# Patient Record
Sex: MALE | Race: ASIAN | Employment: FULL TIME | URBAN - METROPOLITAN AREA
[De-identification: names, ages, dates, MRNs, and addresses within clinical notes are randomized per-mention and may not be internally consistent; named-entity substitution may affect disease eponyms.]

---

## 2017-05-26 ENCOUNTER — EMPLOYEE WELLNESS (OUTPATIENT)
Dept: OTHER | Age: 47
End: 2017-05-26

## 2017-05-26 LAB
CHOLESTEROL, TOTAL: 275 MG/DL (ref 0–199)
GLUCOSE BLD-MCNC: 75 MG/DL (ref 70–99)
HDLC SERPL-MCNC: 61 MG/DL (ref 40–60)
LDL CHOLESTEROL CALCULATED: 189 MG/DL
TRIGL SERPL-MCNC: 127 MG/DL (ref 0–150)

## 2017-11-14 ENCOUNTER — OFFICE VISIT (OUTPATIENT)
Dept: URGENT CARE | Age: 47
End: 2017-11-14

## 2017-11-14 VITALS
WEIGHT: 254 LBS | DIASTOLIC BLOOD PRESSURE: 88 MMHG | TEMPERATURE: 97.3 F | OXYGEN SATURATION: 96 % | HEART RATE: 66 BPM | HEIGHT: 67 IN | RESPIRATION RATE: 14 BRPM | SYSTOLIC BLOOD PRESSURE: 134 MMHG | BODY MASS INDEX: 39.87 KG/M2

## 2017-11-14 DIAGNOSIS — H10.9 BACTERIAL CONJUNCTIVITIS OF RIGHT EYE: Primary | ICD-10-CM

## 2017-11-14 PROCEDURE — 99202 OFFICE O/P NEW SF 15 MIN: CPT | Performed by: PHYSICIAN ASSISTANT

## 2017-11-14 RX ORDER — POLYMYXIN B SULFATE AND TRIMETHOPRIM 1; 10000 MG/ML; [USP'U]/ML
1 SOLUTION OPHTHALMIC 4 TIMES DAILY
Qty: 1 BOTTLE | Refills: 0 | Status: SHIPPED | OUTPATIENT
Start: 2017-11-14 | End: 2017-11-19

## 2017-11-14 ASSESSMENT — ENCOUNTER SYMPTOMS
PHOTOPHOBIA: 0
BLURRED VISION: 0
EYE PAIN: 0
DOUBLE VISION: 0
EYE DISCHARGE: 1
SORE THROAT: 0
SINUS PAIN: 0
EYE REDNESS: 1

## 2017-11-14 NOTE — PROGRESS NOTES
Reason for Visit:   Chief Complaint   Patient presents with    Conjunctivitis     Woke up this AM with eyes matted, has had red dry eyes x3 days     Patient History     HPI: Rupa Reece is a 52 y.o. male who presents with R eye erythema, itching/gritty sensation x 2 days. He reports waking up this morning with matting and purulent crusting at affected eye. He denies any vision changes, photophobia, eye pain/pressure, trauma, or FB to the eye. He usually wars contacts, but has been wearing glasses since sxs have developed. He reports a son who is ill with URI at home. Past Medical History:   Diagnosis Date    Chicken pox        History reviewed. No pertinent surgical history. Allergies: Allergies   Allergen Reactions    Amoxicillin        Review of Systems     ROS: Please refer to HPI for any pertinent positive findings, as all other systems were reviewed as negative unless otherwise listed above. Review of Systems   Constitutional: Negative for chills, diaphoresis, fever and malaise/fatigue. HENT: Negative for congestion, ear pain, sinus pain and sore throat. Eyes: Positive for discharge and redness. Negative for blurred vision, double vision, photophobia and pain. Physical Exam     Vitals:    11/14/17 1016   BP: 134/88   Pulse: 66   Resp: 14   Temp: 97.3 °F (36.3 °C)   SpO2: 96%       Constitutional:  Well developed, well nourished, no acute distress, non-toxic appearance   Eyes:  PERRL and EOMs intact; conjunctival and scleral erythema present at R eye, no ciliary injection, evidence of foreign body, or discharge. HENT:  Head is normocephalic, atraumatic; external ears normal, external nose and nasal mucosa normal, oropharynx pink and moist, no pharyngeal exudates. Neck- Supple, passive and active range of motion in tact, no tenderness upon palpation along spine. No LAD  or neck masses appreciated.    Respiratory:  No respiratory

## 2018-03-20 VITALS — WEIGHT: 247 LBS

## 2019-05-24 ENCOUNTER — EMPLOYEE WELLNESS (OUTPATIENT)
Dept: OTHER | Age: 49
End: 2019-05-24

## 2019-05-24 LAB
CHOLESTEROL, TOTAL: 238 MG/DL (ref 0–199)
GLUCOSE BLD-MCNC: 83 MG/DL (ref 70–99)
HDLC SERPL-MCNC: 56 MG/DL (ref 40–60)
LDL CHOLESTEROL CALCULATED: 160 MG/DL
TRIGL SERPL-MCNC: 108 MG/DL (ref 0–150)

## 2019-05-28 VITALS — WEIGHT: 255 LBS | BODY MASS INDEX: 39.94 KG/M2

## 2020-01-01 ENCOUNTER — APPOINTMENT (OUTPATIENT)
Dept: INTERVENTIONAL RADIOLOGY/VASCULAR | Age: 50
DRG: 208 | End: 2020-01-01
Payer: COMMERCIAL

## 2020-01-01 ENCOUNTER — CARE COORDINATION (OUTPATIENT)
Dept: OTHER | Facility: CLINIC | Age: 50
End: 2020-01-01

## 2020-01-01 ENCOUNTER — APPOINTMENT (OUTPATIENT)
Dept: CT IMAGING | Age: 50
DRG: 208 | End: 2020-01-01
Payer: COMMERCIAL

## 2020-01-01 ENCOUNTER — APPOINTMENT (OUTPATIENT)
Dept: GENERAL RADIOLOGY | Age: 50
DRG: 208 | End: 2020-01-01
Payer: COMMERCIAL

## 2020-01-01 ENCOUNTER — TELEPHONE (OUTPATIENT)
Dept: PRIMARY CARE CLINIC | Age: 50
End: 2020-01-01
Payer: COMMERCIAL

## 2020-01-01 ENCOUNTER — HOSPITAL ENCOUNTER (OUTPATIENT)
Dept: INTERVENTIONAL RADIOLOGY/VASCULAR | Age: 50
Discharge: HOME | End: 2020-08-25
Attending: INTERNAL MEDICINE | Admitting: INTERNAL MEDICINE
Payer: COMMERCIAL

## 2020-01-01 ENCOUNTER — HOSPITAL ENCOUNTER (INPATIENT)
Age: 50
LOS: 7 days | DRG: 208 | End: 2020-10-05
Attending: EMERGENCY MEDICINE | Admitting: INTERNAL MEDICINE
Payer: COMMERCIAL

## 2020-01-01 ENCOUNTER — TELEPHONE (OUTPATIENT)
Dept: PRIMARY CARE CLINIC | Age: 50
End: 2020-01-01

## 2020-01-01 ENCOUNTER — HOSPITAL ENCOUNTER (OUTPATIENT)
Dept: INTERVENTIONAL RADIOLOGY/VASCULAR | Age: 50
Discharge: HOME | End: 2020-09-21
Payer: COMMERCIAL

## 2020-01-01 ENCOUNTER — APPOINTMENT (OUTPATIENT)
Dept: ULTRASOUND IMAGING | Age: 50
DRG: 208 | End: 2020-01-01
Payer: COMMERCIAL

## 2020-01-01 ENCOUNTER — VIRTUAL VISIT (OUTPATIENT)
Dept: PRIMARY CARE CLINIC | Age: 50
End: 2020-01-01
Payer: COMMERCIAL

## 2020-01-01 ENCOUNTER — OFFICE VISIT (OUTPATIENT)
Dept: PRIMARY CARE CLINIC | Age: 50
End: 2020-01-01
Payer: COMMERCIAL

## 2020-01-01 VITALS — HEIGHT: 67 IN | WEIGHT: 235 LBS | TEMPERATURE: 98.4 F | BODY MASS INDEX: 36.88 KG/M2

## 2020-01-01 VITALS — TEMPERATURE: 98.6 F | WEIGHT: 225 LBS | HEIGHT: 67 IN | BODY MASS INDEX: 35.31 KG/M2

## 2020-01-01 VITALS
WEIGHT: 255.51 LBS | HEIGHT: 67 IN | BODY MASS INDEX: 40.1 KG/M2 | DIASTOLIC BLOOD PRESSURE: 24 MMHG | OXYGEN SATURATION: 80 % | TEMPERATURE: 97.9 F | SYSTOLIC BLOOD PRESSURE: 60 MMHG | HEART RATE: 99 BPM | RESPIRATION RATE: 25 BRPM

## 2020-01-01 LAB
ALBUMIN FLUID: 0.7 G/DL
ALBUMIN SERPL-MCNC: 2.4 G/DL (ref 3.4–5)
ALBUMIN SERPL-MCNC: 2.5 G/DL (ref 3.4–5)
ALBUMIN SERPL-MCNC: 2.5 G/DL (ref 3.4–5)
ALBUMIN SERPL-MCNC: 2.6 G/DL (ref 3.4–5)
ALBUMIN SERPL-MCNC: 2.6 G/DL (ref 3.4–5)
ALBUMIN SERPL-MCNC: 3 G/DL (ref 3.4–5)
ALP BLD-CCNC: 499 U/L (ref 40–129)
ALP BLD-CCNC: 582 U/L (ref 40–129)
ALP BLD-CCNC: 695 U/L (ref 40–129)
ALP BLD-CCNC: 747 U/L (ref 40–129)
ALP BLD-CCNC: 770 U/L (ref 40–129)
ALP BLD-CCNC: 780 U/L (ref 40–129)
ALP BLD-CCNC: 780 U/L (ref 40–129)
ALT SERPL-CCNC: 106 U/L (ref 10–40)
ALT SERPL-CCNC: 108 U/L (ref 10–40)
ALT SERPL-CCNC: 113 U/L (ref 10–40)
ALT SERPL-CCNC: 117 U/L (ref 10–40)
ALT SERPL-CCNC: 124 U/L (ref 10–40)
ALT SERPL-CCNC: 254 U/L (ref 10–40)
ALT SERPL-CCNC: 85 U/L (ref 10–40)
AMORPHOUS: ABNORMAL /HPF
AMYLASE FLUID: 55 U/L
ANION GAP SERPL CALCULATED.3IONS-SCNC: 13 MMOL/L (ref 3–16)
ANION GAP SERPL CALCULATED.3IONS-SCNC: 14 MMOL/L (ref 3–16)
ANION GAP SERPL CALCULATED.3IONS-SCNC: 14 MMOL/L (ref 3–16)
ANION GAP SERPL CALCULATED.3IONS-SCNC: 15 MMOL/L (ref 3–16)
ANION GAP SERPL CALCULATED.3IONS-SCNC: 17 MMOL/L (ref 3–16)
ANION GAP SERPL CALCULATED.3IONS-SCNC: 17 MMOL/L (ref 3–16)
ANION GAP SERPL CALCULATED.3IONS-SCNC: 18 MMOL/L (ref 3–16)
ANION GAP SERPL CALCULATED.3IONS-SCNC: 18 MMOL/L (ref 3–16)
ANION GAP SERPL CALCULATED.3IONS-SCNC: 20 MMOL/L (ref 3–16)
ANION GAP SERPL CALCULATED.3IONS-SCNC: 20 MMOL/L (ref 3–16)
ANISOCYTOSIS: ABNORMAL
ANTI-NUCLEAR ANTIBODY (ANA): NEGATIVE
ANTI-SCL70 IGG: <0.2 AI (ref 0–0.9)
ANTI-XA UNFRAC HEPARIN: 0.04 IU/ML (ref 0.3–0.7)
ANTI-XA UNFRAC HEPARIN: 0.05 IU/ML (ref 0.3–0.7)
ANTI-XA UNFRAC HEPARIN: 0.05 IU/ML (ref 0.3–0.7)
ANTI-XA UNFRAC HEPARIN: 0.13 IU/ML (ref 0.3–0.7)
APPEARANCE FLUID: NORMAL
APTT: 125.6 SEC (ref 24.2–36.2)
APTT: 126 SEC (ref 24.2–36.2)
APTT: 139.5 SEC (ref 24.2–36.2)
APTT: 148.2 SEC (ref 24.2–36.2)
APTT: 32 SEC (ref 24.2–36.2)
APTT: 34.1 SEC (ref 24.2–36.2)
APTT: 35.9 SEC (ref 24.2–36.2)
APTT: 59.9 SEC (ref 24.2–36.2)
APTT: 63.7 SEC (ref 24.2–36.2)
APTT: 70.7 SEC (ref 24.2–36.2)
APTT: 72.1 SEC (ref 24.2–36.2)
APTT: 74.2 SEC (ref 24.2–36.2)
APTT: 84 SEC (ref 24.2–36.2)
APTT: >248 SEC (ref 24.2–36.2)
AST SERPL-CCNC: 1746 U/L (ref 15–37)
AST SERPL-CCNC: 206 U/L (ref 15–37)
AST SERPL-CCNC: 210 U/L (ref 15–37)
AST SERPL-CCNC: 213 U/L (ref 15–37)
AST SERPL-CCNC: 234 U/L (ref 15–37)
BACTERIA: ABNORMAL /HPF
BANDED NEUTROPHILS RELATIVE PERCENT: 1 % (ref 0–7)
BANDED NEUTROPHILS RELATIVE PERCENT: 3 % (ref 0–7)
BANDED NEUTROPHILS RELATIVE PERCENT: 5 % (ref 0–7)
BASE EXCESS ARTERIAL: -11 (ref -3–3)
BASE EXCESS ARTERIAL: -12 (ref -3–3)
BASE EXCESS ARTERIAL: -13 (ref -3–3)
BASE EXCESS ARTERIAL: -13 (ref -3–3)
BASE EXCESS ARTERIAL: -8 (ref -3–3)
BASE EXCESS VENOUS: -1.7 MMOL/L (ref -2–3)
BASE EXCESS VENOUS: -12 (ref -3–3)
BASE EXCESS VENOUS: -13 (ref -3–3)
BASOPHILS ABSOLUTE: 0 K/UL (ref 0–0.2)
BASOPHILS ABSOLUTE: 0.1 K/UL (ref 0–0.2)
BASOPHILS ABSOLUTE: 0.1 K/UL (ref 0–0.2)
BASOPHILS ABSOLUTE: 0.4 K/UL (ref 0–0.2)
BASOPHILS RELATIVE PERCENT: 0 %
BASOPHILS RELATIVE PERCENT: 0.2 %
BASOPHILS RELATIVE PERCENT: 0.3 %
BASOPHILS RELATIVE PERCENT: 0.4 %
BASOPHILS RELATIVE PERCENT: 1 %
BILIRUB SERPL-MCNC: 5.3 MG/DL (ref 0–1)
BILIRUB SERPL-MCNC: 5.3 MG/DL (ref 0–1)
BILIRUB SERPL-MCNC: 6.1 MG/DL (ref 0–1)
BILIRUB SERPL-MCNC: 7.3 MG/DL (ref 0–1)
BILIRUB SERPL-MCNC: 8.4 MG/DL (ref 0–1)
BILIRUB SERPL-MCNC: 8.5 MG/DL (ref 0–1)
BILIRUB SERPL-MCNC: 9.6 MG/DL (ref 0–1)
BILIRUBIN DIRECT: 4.4 MG/DL (ref 0–0.3)
BILIRUBIN DIRECT: 4.6 MG/DL (ref 0–0.3)
BILIRUBIN DIRECT: 5.3 MG/DL (ref 0–0.3)
BILIRUBIN DIRECT: 6.2 MG/DL (ref 0–0.3)
BILIRUBIN DIRECT: 6.2 MG/DL (ref 0–0.3)
BILIRUBIN DIRECT: 7 MG/DL (ref 0–0.3)
BILIRUBIN DIRECT: 7.7 MG/DL (ref 0–0.3)
BILIRUBIN URINE: ABNORMAL
BILIRUBIN, INDIRECT: 0.7 MG/DL (ref 0–1)
BILIRUBIN, INDIRECT: 0.8 MG/DL (ref 0–1)
BILIRUBIN, INDIRECT: 0.9 MG/DL (ref 0–1)
BILIRUBIN, INDIRECT: 1.1 MG/DL (ref 0–1)
BILIRUBIN, INDIRECT: 1.4 MG/DL (ref 0–1)
BILIRUBIN, INDIRECT: 1.9 MG/DL (ref 0–1)
BILIRUBIN, INDIRECT: 2.3 MG/DL (ref 0–1)
BLOOD CULTURE, ROUTINE: NORMAL
BLOOD SMEAR REVIEW: NORMAL
BLOOD, URINE: NEGATIVE
BODY FLUID CULTURE, STERILE: NORMAL
BUN BLDV-MCNC: 30 MG/DL (ref 7–20)
BUN BLDV-MCNC: 34 MG/DL (ref 7–20)
BUN BLDV-MCNC: 37 MG/DL (ref 7–20)
BUN BLDV-MCNC: 41 MG/DL (ref 7–20)
BUN BLDV-MCNC: 52 MG/DL (ref 7–20)
BUN BLDV-MCNC: 64 MG/DL (ref 7–20)
BUN BLDV-MCNC: 68 MG/DL (ref 7–20)
BUN BLDV-MCNC: 71 MG/DL (ref 7–20)
BUN BLDV-MCNC: 72 MG/DL (ref 7–20)
BUN BLDV-MCNC: 86 MG/DL (ref 7–20)
CALCIUM IONIZED: 0.96 MMOL/L (ref 1.12–1.32)
CALCIUM IONIZED: 1 MMOL/L (ref 1.12–1.32)
CALCIUM IONIZED: 1.01 MMOL/L (ref 1.12–1.32)
CALCIUM IONIZED: 1.02 MMOL/L (ref 1.12–1.32)
CALCIUM IONIZED: 1.04 MMOL/L (ref 1.12–1.32)
CALCIUM IONIZED: 1.05 MMOL/L (ref 1.12–1.32)
CALCIUM IONIZED: 1.08 MMOL/L (ref 1.12–1.32)
CALCIUM IONIZED: 1.19 MMOL/L (ref 1.12–1.32)
CALCIUM SERPL-MCNC: 6.5 MG/DL (ref 8.3–10.6)
CALCIUM SERPL-MCNC: 7.8 MG/DL (ref 8.3–10.6)
CALCIUM SERPL-MCNC: 7.8 MG/DL (ref 8.3–10.6)
CALCIUM SERPL-MCNC: 8 MG/DL (ref 8.3–10.6)
CALCIUM SERPL-MCNC: 8 MG/DL (ref 8.3–10.6)
CALCIUM SERPL-MCNC: 8.1 MG/DL (ref 8.3–10.6)
CALCIUM SERPL-MCNC: 8.1 MG/DL (ref 8.3–10.6)
CALCIUM SERPL-MCNC: 8.2 MG/DL (ref 8.3–10.6)
CALCIUM SERPL-MCNC: 8.2 MG/DL (ref 8.3–10.6)
CALCIUM SERPL-MCNC: 8.3 MG/DL (ref 8.3–10.6)
CARBOXYHEMOGLOBIN: 1.6 % (ref 0–1.5)
CELL COUNT FLUID TYPE: NORMAL
CHLORIDE BLD-SCNC: 86 MMOL/L (ref 99–110)
CHLORIDE BLD-SCNC: 86 MMOL/L (ref 99–110)
CHLORIDE BLD-SCNC: 87 MMOL/L (ref 99–110)
CHLORIDE BLD-SCNC: 87 MMOL/L (ref 99–110)
CHLORIDE BLD-SCNC: 88 MMOL/L (ref 99–110)
CHLORIDE BLD-SCNC: 90 MMOL/L (ref 99–110)
CHLORIDE BLD-SCNC: 90 MMOL/L (ref 99–110)
CHLORIDE BLD-SCNC: 91 MMOL/L (ref 99–110)
CHLORIDE BLD-SCNC: 92 MMOL/L (ref 99–110)
CHLORIDE BLD-SCNC: 96 MMOL/L (ref 99–110)
CHOLESTEROL, TOTAL: 199 MG/DL (ref 0–199)
CLARITY: CLEAR
CLOT EVALUATION: NORMAL
CO2: 16 MMOL/L (ref 21–32)
CO2: 17 MMOL/L (ref 21–32)
CO2: 17 MMOL/L (ref 21–32)
CO2: 18 MMOL/L (ref 21–32)
CO2: 19 MMOL/L (ref 21–32)
CO2: 20 MMOL/L (ref 21–32)
COLOR FLUID: YELLOW
COLOR: YELLOW
CORTISOL TOTAL: 27.2 UG/DL
CREAT SERPL-MCNC: 0.9 MG/DL (ref 0.9–1.3)
CREAT SERPL-MCNC: 0.9 MG/DL (ref 0.9–1.3)
CREAT SERPL-MCNC: 1.1 MG/DL (ref 0.9–1.3)
CREAT SERPL-MCNC: 1.2 MG/DL (ref 0.9–1.3)
CREAT SERPL-MCNC: 1.2 MG/DL (ref 0.9–1.3)
CREAT SERPL-MCNC: 1.3 MG/DL (ref 0.9–1.3)
CREAT SERPL-MCNC: 2 MG/DL (ref 0.9–1.3)
CREAT SERPL-MCNC: 2 MG/DL (ref 0.9–1.3)
CREATININE URINE: 248.2 MG/DL (ref 39–259)
CULTURE CATHETER TIP: ABNORMAL
CULTURE CATHETER TIP: ABNORMAL
CULTURE, BLOOD 2: NORMAL
D DIMER: >5250 NG/ML DDU (ref 0–229)
EKG ATRIAL RATE: 110 BPM
EKG ATRIAL RATE: 111 BPM
EKG DIAGNOSIS: NORMAL
EKG DIAGNOSIS: NORMAL
EKG P AXIS: 49 DEGREES
EKG P AXIS: 55 DEGREES
EKG P-R INTERVAL: 116 MS
EKG P-R INTERVAL: 116 MS
EKG Q-T INTERVAL: 324 MS
EKG Q-T INTERVAL: 348 MS
EKG QRS DURATION: 86 MS
EKG QRS DURATION: 90 MS
EKG QTC CALCULATION (BAZETT): 438 MS
EKG QTC CALCULATION (BAZETT): 473 MS
EKG R AXIS: 121 DEGREES
EKG R AXIS: 17 DEGREES
EKG T AXIS: 20 DEGREES
EKG T AXIS: 39 DEGREES
EKG VENTRICULAR RATE: 110 BPM
EKG VENTRICULAR RATE: 111 BPM
EOSINOPHILS ABSOLUTE: 0 K/UL (ref 0–0.6)
EOSINOPHILS ABSOLUTE: 0 K/UL (ref 0–0.6)
EOSINOPHILS ABSOLUTE: 0.1 K/UL (ref 0–0.6)
EOSINOPHILS ABSOLUTE: 0.1 K/UL (ref 0–0.6)
EOSINOPHILS ABSOLUTE: 0.2 K/UL (ref 0–0.6)
EOSINOPHILS ABSOLUTE: 0.3 K/UL (ref 0–0.6)
EOSINOPHILS ABSOLUTE: 0.4 K/UL (ref 0–0.6)
EOSINOPHILS RELATIVE PERCENT: 0 %
EOSINOPHILS RELATIVE PERCENT: 0 %
EOSINOPHILS RELATIVE PERCENT: 0.3 %
EOSINOPHILS RELATIVE PERCENT: 0.3 %
EOSINOPHILS RELATIVE PERCENT: 1 %
FACTOR VIII ACTIVITY: 1020 % (ref 50–150)
FIBRINOGEN: 139 MG/DL (ref 200–397)
FIBRINOGEN: 223 MG/DL (ref 200–397)
FIBRINOGEN: 233 MG/DL (ref 200–397)
FIBRINOGEN: 240 MG/DL (ref 200–397)
FIBRINOGEN: 240 MG/DL (ref 200–397)
FIBRINOGEN: 242 MG/DL (ref 200–397)
FIBRINOGEN: 246 MG/DL (ref 200–397)
FIBRINOGEN: 269 MG/DL (ref 200–397)
FLUID TYPE: NORMAL
GFR AFRICAN AMERICAN: 43
GFR AFRICAN AMERICAN: 43
GFR AFRICAN AMERICAN: >60
GFR NON-AFRICAN AMERICAN: 36
GFR NON-AFRICAN AMERICAN: 36
GFR NON-AFRICAN AMERICAN: 58
GFR NON-AFRICAN AMERICAN: >60
GLUCOSE BLD-MCNC: 106 MG/DL (ref 70–99)
GLUCOSE BLD-MCNC: 107 MG/DL (ref 70–99)
GLUCOSE BLD-MCNC: 109 MG/DL (ref 70–99)
GLUCOSE BLD-MCNC: 113 MG/DL (ref 70–99)
GLUCOSE BLD-MCNC: 117 MG/DL (ref 70–99)
GLUCOSE BLD-MCNC: 117 MG/DL (ref 70–99)
GLUCOSE BLD-MCNC: 120 MG/DL (ref 70–99)
GLUCOSE BLD-MCNC: 129 MG/DL (ref 70–99)
GLUCOSE BLD-MCNC: 147 MG/DL (ref 70–99)
GLUCOSE BLD-MCNC: 154 MG/DL (ref 70–99)
GLUCOSE BLD-MCNC: 163 MG/DL (ref 70–99)
GLUCOSE BLD-MCNC: 175 MG/DL (ref 70–99)
GLUCOSE BLD-MCNC: 361 MG/DL (ref 70–99)
GLUCOSE BLD-MCNC: 87 MG/DL (ref 70–99)
GLUCOSE BLD-MCNC: 92 MG/DL (ref 70–99)
GLUCOSE BLD-MCNC: 93 MG/DL (ref 70–99)
GLUCOSE BLD-MCNC: 93 MG/DL (ref 70–99)
GLUCOSE URINE: NEGATIVE MG/DL
GLUCOSE, FLUID: 128 MG/DL
GRAM STAIN RESULT: NORMAL
HAPTOGLOBIN: <10 MG/DL (ref 30–200)
HAV IGM SER IA-ACNC: NORMAL
HCO3 ARTERIAL: 16 MMOL/L (ref 21–29)
HCO3 ARTERIAL: 16.6 MMOL/L (ref 21–29)
HCO3 ARTERIAL: 16.7 MMOL/L (ref 21–29)
HCO3 ARTERIAL: 17.2 MMOL/L (ref 21–29)
HCO3 ARTERIAL: 19.1 MMOL/L (ref 21–29)
HCO3 VENOUS: 16.3 MMOL/L (ref 23–29)
HCO3 VENOUS: 18.1 MMOL/L (ref 23–29)
HCO3 VENOUS: 20.9 MMOL/L (ref 24–28)
HCT VFR BLD CALC: 28.6 % (ref 40.5–52.5)
HCT VFR BLD CALC: 35.7 % (ref 40.5–52.5)
HCT VFR BLD CALC: 38.4 % (ref 40.5–52.5)
HCT VFR BLD CALC: 39.7 % (ref 40.5–52.5)
HCT VFR BLD CALC: 40.8 % (ref 40.5–52.5)
HCT VFR BLD CALC: 41.6 % (ref 40.5–52.5)
HCT VFR BLD CALC: 42.2 % (ref 40.5–52.5)
HCT VFR BLD CALC: 42.2 % (ref 40.5–52.5)
HCT VFR BLD CALC: 42.3 % (ref 40.5–52.5)
HCT VFR BLD CALC: 45.6 % (ref 40.5–52.5)
HCT VFR BLD CALC: 47.3 % (ref 40.5–52.5)
HDLC SERPL-MCNC: 17 MG/DL (ref 40–60)
HEMOGLOBIN, VEN, REDUCED: 37.9 %
HEMOGLOBIN: 11.2 G/DL (ref 13.5–17.5)
HEMOGLOBIN: 12.6 G/DL (ref 13.5–17.5)
HEMOGLOBIN: 13.1 G/DL (ref 13.5–17.5)
HEMOGLOBIN: 13.7 G/DL (ref 13.5–17.5)
HEMOGLOBIN: 13.9 G/DL (ref 13.5–17.5)
HEMOGLOBIN: 14 G/DL (ref 13.5–17.5)
HEMOGLOBIN: 14.1 G/DL (ref 13.5–17.5)
HEMOGLOBIN: 14.2 G/DL (ref 13.5–17.5)
HEMOGLOBIN: 15.5 G/DL (ref 13.5–17.5)
HEMOGLOBIN: 15.7 G/DL (ref 13.5–17.5)
HEMOGLOBIN: 9 G/DL (ref 13.5–17.5)
HEPARIN INDUCED PLATELET ANTIBODY: NEGATIVE
HEPATITIS B CORE IGM ANTIBODY: NORMAL
HEPATITIS B SURFACE ANTIGEN INTERPRETATION: NORMAL
HEPATITIS C ANTIBODY INTERPRETATION: NORMAL
HYALINE CASTS: ABNORMAL /LPF (ref 0–2)
HYPOCHROMIA: ABNORMAL
HYPOCHROMIA: ABNORMAL
INR BLD: 1.03 (ref 0.86–1.14)
INR BLD: 1.34 (ref 0.86–1.14)
INR BLD: 1.44 (ref 0.86–1.14)
INR BLD: 1.56 (ref 0.86–1.14)
INR BLD: 1.58 (ref 0.86–1.14)
INR BLD: 6.35 (ref 0.86–1.14)
KETONES, URINE: ABNORMAL MG/DL
LACTATE DEHYDROGENASE: 1175 U/L (ref 100–190)
LACTATE DEHYDROGENASE: 1395 U/L (ref 100–190)
LACTATE: 10.27 MMOL/L (ref 0.4–2)
LACTATE: 11.33 MMOL/L (ref 0.4–2)
LACTATE: 13.18 MMOL/L (ref 0.4–2)
LACTATE: 15.24 MMOL/L (ref 0.4–2)
LACTATE: 6.59 MMOL/L (ref 0.4–2)
LACTATE: 8.72 MMOL/L (ref 0.4–2)
LACTATE: 9.56 MMOL/L (ref 0.4–2)
LACTIC ACID: 11.4 MMOL/L (ref 0.4–2)
LACTIC ACID: 3 MMOL/L (ref 0.4–2)
LACTIC ACID: 3 MMOL/L (ref 0.4–2)
LACTIC ACID: 3.1 MMOL/L (ref 0.4–2)
LACTIC ACID: 3.2 MMOL/L (ref 0.4–2)
LACTIC ACID: 3.3 MMOL/L (ref 0.4–2)
LACTIC ACID: 3.4 MMOL/L (ref 0.4–2)
LACTIC ACID: 3.8 MMOL/L (ref 0.4–2)
LACTIC ACID: 4.5 MMOL/L (ref 0.4–2)
LACTIC ACID: 6.3 MMOL/L (ref 0.4–2)
LD, FLUID: 358 U/L
LDL CHOLESTEROL CALCULATED: 159 MG/DL
LEUKOCYTE ESTERASE, URINE: NEGATIVE
LV EF: 58 %
LVEF MODALITY: NORMAL
LYMPHOCYTES ABSOLUTE: 0 K/UL (ref 1–5.1)
LYMPHOCYTES ABSOLUTE: 0.3 K/UL (ref 1–5.1)
LYMPHOCYTES ABSOLUTE: 0.5 K/UL (ref 1–5.1)
LYMPHOCYTES ABSOLUTE: 0.7 K/UL (ref 1–5.1)
LYMPHOCYTES ABSOLUTE: 0.8 K/UL (ref 1–5.1)
LYMPHOCYTES ABSOLUTE: 1.1 K/UL (ref 1–5.1)
LYMPHOCYTES ABSOLUTE: 1.1 K/UL (ref 1–5.1)
LYMPHOCYTES RELATIVE PERCENT: 0 %
LYMPHOCYTES RELATIVE PERCENT: 1 %
LYMPHOCYTES RELATIVE PERCENT: 2 %
LYMPHOCYTES RELATIVE PERCENT: 2.7 %
LYMPHOCYTES RELATIVE PERCENT: 3 %
LYMPHOCYTES RELATIVE PERCENT: 3 %
LYMPHOCYTES RELATIVE PERCENT: 3.1 %
LYMPHOCYTES RELATIVE PERCENT: 4 %
LYMPHOCYTES RELATIVE PERCENT: 4 %
LYMPHOCYTES, BODY FLUID: 14 %
MACROCYTES: ABNORMAL
MACROPHAGE FLUID: 53 %
MAGNESIUM: 2.8 MG/DL (ref 1.8–2.4)
MAGNESIUM: 3.3 MG/DL (ref 1.8–2.4)
MCH RBC QN AUTO: 27.8 PG (ref 26–34)
MCH RBC QN AUTO: 28.1 PG (ref 26–34)
MCH RBC QN AUTO: 28.2 PG (ref 26–34)
MCH RBC QN AUTO: 28.4 PG (ref 26–34)
MCH RBC QN AUTO: 28.5 PG (ref 26–34)
MCH RBC QN AUTO: 28.7 PG (ref 26–34)
MCHC RBC AUTO-ENTMCNC: 31.5 G/DL (ref 31–36)
MCHC RBC AUTO-ENTMCNC: 31.6 G/DL (ref 31–36)
MCHC RBC AUTO-ENTMCNC: 32.8 G/DL (ref 31–36)
MCHC RBC AUTO-ENTMCNC: 33 G/DL (ref 31–36)
MCHC RBC AUTO-ENTMCNC: 33 G/DL (ref 31–36)
MCHC RBC AUTO-ENTMCNC: 33.1 G/DL (ref 31–36)
MCHC RBC AUTO-ENTMCNC: 33.3 G/DL (ref 31–36)
MCHC RBC AUTO-ENTMCNC: 33.5 G/DL (ref 31–36)
MCHC RBC AUTO-ENTMCNC: 33.6 G/DL (ref 31–36)
MCHC RBC AUTO-ENTMCNC: 33.6 G/DL (ref 31–36)
MCHC RBC AUTO-ENTMCNC: 34 G/DL (ref 31–36)
MCV RBC AUTO: 84.1 FL (ref 80–100)
MCV RBC AUTO: 84.4 FL (ref 80–100)
MCV RBC AUTO: 84.7 FL (ref 80–100)
MCV RBC AUTO: 85.1 FL (ref 80–100)
MCV RBC AUTO: 85.2 FL (ref 80–100)
MCV RBC AUTO: 86.2 FL (ref 80–100)
MCV RBC AUTO: 86.2 FL (ref 80–100)
MCV RBC AUTO: 88.4 FL (ref 80–100)
MCV RBC AUTO: 89.9 FL (ref 80–100)
METAMYELOCYTES RELATIVE PERCENT: 1 %
METHEMOGLOBIN VENOUS: 0.4 % (ref 0–1.5)
MICROSCOPIC EXAMINATION: YES
MITOCHONDRIAL M2 AB, IGG: 3 UNITS (ref 0–24.9)
MONOCYTES ABSOLUTE: 0.7 K/UL (ref 0–1.3)
MONOCYTES ABSOLUTE: 1.4 K/UL (ref 0–1.3)
MONOCYTES ABSOLUTE: 1.5 K/UL (ref 0–1.3)
MONOCYTES ABSOLUTE: 1.6 K/UL (ref 0–1.3)
MONOCYTES ABSOLUTE: 1.8 K/UL (ref 0–1.3)
MONOCYTES ABSOLUTE: 1.8 K/UL (ref 0–1.3)
MONOCYTES ABSOLUTE: 2.2 K/UL (ref 0–1.3)
MONOCYTES ABSOLUTE: 2.3 K/UL (ref 0–1.3)
MONOCYTES ABSOLUTE: 2.7 K/UL (ref 0–1.3)
MONOCYTES RELATIVE PERCENT: 10 %
MONOCYTES RELATIVE PERCENT: 3 %
MONOCYTES RELATIVE PERCENT: 5 %
MONOCYTES RELATIVE PERCENT: 5.8 %
MONOCYTES RELATIVE PERCENT: 6.3 %
MONOCYTES RELATIVE PERCENT: 6.5 %
MONOCYTES RELATIVE PERCENT: 7 %
MONOCYTES RELATIVE PERCENT: 8 %
MONOCYTES RELATIVE PERCENT: 8 %
MRSA SCREEN RT-PCR: NORMAL
MYELOCYTE PERCENT: 2 %
MYELOCYTE PERCENT: 3 %
NEUTROPHIL, FLUID: 33 %
NEUTROPHILS ABSOLUTE: 20.7 K/UL (ref 1.7–7.7)
NEUTROPHILS ABSOLUTE: 21.7 K/UL (ref 1.7–7.7)
NEUTROPHILS ABSOLUTE: 21.9 K/UL (ref 1.7–7.7)
NEUTROPHILS ABSOLUTE: 23.2 K/UL (ref 1.7–7.7)
NEUTROPHILS ABSOLUTE: 23.5 K/UL (ref 1.7–7.7)
NEUTROPHILS ABSOLUTE: 23.9 K/UL (ref 1.7–7.7)
NEUTROPHILS ABSOLUTE: 24.2 K/UL (ref 1.7–7.7)
NEUTROPHILS ABSOLUTE: 24.9 K/UL (ref 1.7–7.7)
NEUTROPHILS ABSOLUTE: 33.4 K/UL (ref 1.7–7.7)
NEUTROPHILS RELATIVE PERCENT: 82 %
NEUTROPHILS RELATIVE PERCENT: 87 %
NEUTROPHILS RELATIVE PERCENT: 88 %
NEUTROPHILS RELATIVE PERCENT: 88 %
NEUTROPHILS RELATIVE PERCENT: 89.9 %
NEUTROPHILS RELATIVE PERCENT: 90 %
NEUTROPHILS RELATIVE PERCENT: 90.2 %
NITRITE, URINE: NEGATIVE
NUCLEATED CELLS FLUID: 1117 /CUMM
NUCLEATED RED BLOOD CELLS: 1 /100 WBC
NUCLEATED RED BLOOD CELLS: 1 /100 WBC
NUMBER OF CELLS COUNTED FLUID: 99
O2 SAT, ARTERIAL: 85 % (ref 93–100)
O2 SAT, ARTERIAL: 92 % (ref 93–100)
O2 SAT, ARTERIAL: 93 % (ref 93–100)
O2 SAT, ARTERIAL: 93 % (ref 93–100)
O2 SAT, ARTERIAL: 95 % (ref 93–100)
O2 SAT, VEN: 19 %
O2 SAT, VEN: 61 %
O2 SAT, VEN: 67 %
ORGANISM: ABNORMAL
OSMOLALITY URINE: 804 MOSM/KG (ref 390–1070)
OSMOLALITY: 275 MOSM/KG (ref 278–305)
OSMOLALITY: 286 MOSM/KG (ref 278–305)
OSMOLALITY: 295 MOSM/KG (ref 278–305)
PCO2 ARTERIAL: 39.7 MM HG (ref 35–45)
PCO2 ARTERIAL: 44.1 MM HG (ref 35–45)
PCO2 ARTERIAL: 48.9 MM HG (ref 35–45)
PCO2 ARTERIAL: 49.5 MM HG (ref 35–45)
PCO2 ARTERIAL: 53.9 MM HG (ref 35–45)
PCO2, VEN: 31.5 MMHG (ref 41–51)
PCO2, VEN: 49.8 MM HG (ref 40–50)
PCO2, VEN: 58.7 MM HG (ref 40–50)
PDW BLD-RTO: 16.2 % (ref 12.4–15.4)
PDW BLD-RTO: 17.3 % (ref 12.4–15.4)
PDW BLD-RTO: 17.5 % (ref 12.4–15.4)
PDW BLD-RTO: 18.1 % (ref 12.4–15.4)
PDW BLD-RTO: 18.2 % (ref 12.4–15.4)
PDW BLD-RTO: 18.4 % (ref 12.4–15.4)
PDW BLD-RTO: 18.4 % (ref 12.4–15.4)
PDW BLD-RTO: 18.6 % (ref 12.4–15.4)
PDW BLD-RTO: 18.7 % (ref 12.4–15.4)
PDW BLD-RTO: 18.9 % (ref 12.4–15.4)
PDW BLD-RTO: 19.1 % (ref 12.4–15.4)
PERFORMED ON: ABNORMAL
PH ARTERIAL: 7.1 (ref 7.35–7.45)
PH ARTERIAL: 7.12 (ref 7.35–7.45)
PH ARTERIAL: 7.14 (ref 7.35–7.45)
PH ARTERIAL: 7.2 (ref 7.35–7.45)
PH ARTERIAL: 7.29 (ref 7.35–7.45)
PH UA: 5.5 (ref 5–8)
PH VENOUS: 7.06 (ref 7.35–7.45)
PH VENOUS: 7.1 (ref 7.35–7.45)
PH VENOUS: 7.12 (ref 7.35–7.45)
PH VENOUS: 7.44 (ref 7.35–7.45)
PHOSPHORUS: 3.8 MG/DL (ref 2.5–4.9)
PHOSPHORUS: 6.7 MG/DL (ref 2.5–4.9)
PHOSPHORUS: 8 MG/DL (ref 2.5–4.9)
PLATELET # BLD: 109 K/UL (ref 135–450)
PLATELET # BLD: 130 K/UL (ref 135–450)
PLATELET # BLD: 159 K/UL (ref 135–450)
PLATELET # BLD: 199 K/UL (ref 135–450)
PLATELET # BLD: 210 K/UL (ref 135–450)
PLATELET # BLD: 318 K/UL (ref 135–450)
PLATELET # BLD: 47 K/UL (ref 135–450)
PLATELET # BLD: 53 K/UL (ref 135–450)
PLATELET # BLD: 58 K/UL (ref 135–450)
PLATELET # BLD: 67 K/UL (ref 135–450)
PLATELET # BLD: 73 K/UL (ref 135–450)
PLATELET SLIDE REVIEW: ABNORMAL
PMV BLD AUTO: 6.8 FL (ref 5–10.5)
PMV BLD AUTO: 7.5 FL (ref 5–10.5)
PMV BLD AUTO: 7.6 FL (ref 5–10.5)
PMV BLD AUTO: 7.7 FL (ref 5–10.5)
PMV BLD AUTO: 7.9 FL (ref 5–10.5)
PMV BLD AUTO: 7.9 FL (ref 5–10.5)
PMV BLD AUTO: 8 FL (ref 5–10.5)
PMV BLD AUTO: 8.2 FL (ref 5–10.5)
PMV BLD AUTO: 8.5 FL (ref 5–10.5)
PMV BLD AUTO: 8.9 FL (ref 5–10.5)
PMV BLD AUTO: 9.1 FL (ref 5–10.5)
PO2 ARTERIAL: 55.5 MM HG (ref 75–108)
PO2 ARTERIAL: 85.5 MM HG (ref 75–108)
PO2 ARTERIAL: 87.1 MM HG (ref 75–108)
PO2 ARTERIAL: 88.6 MM HG (ref 75–108)
PO2 ARTERIAL: 92.8 MM HG (ref 75–108)
PO2, VEN: 21 MM HG
PO2, VEN: 34.9 MMHG (ref 25–40)
PO2, VEN: 46 MM HG
POC POTASSIUM: 5.6 MMOL/L (ref 3.5–5.1)
POC POTASSIUM: 6.2 MMOL/L (ref 3.5–5.1)
POC POTASSIUM: 6.3 MMOL/L (ref 3.5–5.1)
POC POTASSIUM: 6.7 MMOL/L (ref 3.5–5.1)
POC POTASSIUM: 6.9 MMOL/L (ref 3.5–5.1)
POC SAMPLE TYPE: ABNORMAL
POC SODIUM: 124 MMOL/L (ref 136–145)
POC SODIUM: 125 MMOL/L (ref 136–145)
POC SODIUM: 126 MMOL/L (ref 136–145)
POC SODIUM: 126 MMOL/L (ref 136–145)
POC SODIUM: 128 MMOL/L (ref 136–145)
POLYCHROMASIA: ABNORMAL
POTASSIUM REFLEX MAGNESIUM: 4.6 MMOL/L (ref 3.5–5.1)
POTASSIUM REFLEX MAGNESIUM: 4.6 MMOL/L (ref 3.5–5.1)
POTASSIUM REFLEX MAGNESIUM: 4.8 MMOL/L (ref 3.5–5.1)
POTASSIUM REFLEX MAGNESIUM: 4.8 MMOL/L (ref 3.5–5.1)
POTASSIUM REFLEX MAGNESIUM: 5 MMOL/L (ref 3.5–5.1)
POTASSIUM REFLEX MAGNESIUM: 5.3 MMOL/L (ref 3.5–5.1)
POTASSIUM REFLEX MAGNESIUM: 6.3 MMOL/L (ref 3.5–5.1)
POTASSIUM SERPL-SCNC: 5.2 MMOL/L (ref 3.5–5.1)
POTASSIUM SERPL-SCNC: 5.4 MMOL/L (ref 3.5–5.1)
POTASSIUM SERPL-SCNC: 5.5 MMOL/L (ref 3.5–5.1)
POTASSIUM SERPL-SCNC: 6.8 MMOL/L (ref 3.5–5.1)
PRO-BNP: 9069 PG/ML (ref 0–124)
PRO-BNP: 9921 PG/ML (ref 0–124)
PROTEIN FLUID: 1.3 G/DL
PROTEIN UA: ABNORMAL MG/DL
PROTHROMBIN TIME: 12 SEC (ref 10–13.2)
PROTHROMBIN TIME: 15.6 SEC (ref 10–13.2)
PROTHROMBIN TIME: 16.8 SEC (ref 10–13.2)
PROTHROMBIN TIME: 18.2 SEC (ref 10–13.2)
PROTHROMBIN TIME: 18.4 SEC (ref 10–13.2)
PROTHROMBIN TIME: 75 SEC (ref 10–13.2)
RBC # BLD: 3.18 M/UL (ref 4.2–5.9)
RBC # BLD: 4.04 M/UL (ref 4.2–5.9)
RBC # BLD: 4.46 M/UL (ref 4.2–5.9)
RBC # BLD: 4.66 M/UL (ref 4.2–5.9)
RBC # BLD: 4.85 M/UL (ref 4.2–5.9)
RBC # BLD: 4.91 M/UL (ref 4.2–5.9)
RBC # BLD: 4.92 M/UL (ref 4.2–5.9)
RBC # BLD: 4.98 M/UL (ref 4.2–5.9)
RBC # BLD: 4.98 M/UL (ref 4.2–5.9)
RBC # BLD: 5.4 M/UL (ref 4.2–5.9)
RBC # BLD: 5.56 M/UL (ref 4.2–5.9)
RBC FLUID: 995 /CUMM
RBC UA: ABNORMAL /HPF (ref 0–4)
REASON FOR REJECTION: NORMAL
REJECTED TEST: NORMAL
SARS-COV-2, NAA: NOT DETECTED
SARS-COV-2: NOT DETECTED
SODIUM BLD-SCNC: 119 MMOL/L (ref 136–145)
SODIUM BLD-SCNC: 120 MMOL/L (ref 136–145)
SODIUM BLD-SCNC: 120 MMOL/L (ref 136–145)
SODIUM BLD-SCNC: 122 MMOL/L (ref 136–145)
SODIUM BLD-SCNC: 124 MMOL/L (ref 136–145)
SODIUM BLD-SCNC: 126 MMOL/L (ref 136–145)
SODIUM BLD-SCNC: 126 MMOL/L (ref 136–145)
SODIUM BLD-SCNC: 127 MMOL/L (ref 136–145)
SODIUM BLD-SCNC: 133 MMOL/L (ref 136–145)
SODIUM URINE: <20 MMOL/L
SOURCE: NORMAL
SPECIFIC GRAVITY UA: >=1.03 (ref 1–1.03)
TCO2 ARTERIAL: 18 MMOL/L
TCO2 ARTERIAL: 19 MMOL/L
TCO2 ARTERIAL: 20 MMOL/L
TCO2 CALC VENOUS: 18 MMOL/L
TCO2 CALC VENOUS: 20 MMOL/L
TCO2 CALC VENOUS: 22 MMOL/L
TOTAL PROTEIN: 4.1 G/DL (ref 6.4–8.2)
TOTAL PROTEIN: 5.8 G/DL (ref 6.4–8.2)
TOTAL PROTEIN: 5.9 G/DL (ref 6.4–8.2)
TOTAL PROTEIN: 6 G/DL (ref 6.4–8.2)
TOTAL PROTEIN: 6 G/DL (ref 6.4–8.2)
TOTAL PROTEIN: 6.1 G/DL (ref 6.4–8.2)
TRIGL SERPL-MCNC: 117 MG/DL (ref 0–150)
TROPONIN: 0.02 NG/ML
TROPONIN: <0.01 NG/ML
URIC ACID, SERUM: 9.4 MG/DL (ref 3.5–7.2)
URINE TYPE: ABNORMAL
UROBILINOGEN, URINE: 1 E.U./DL
VLDLC SERPL CALC-MCNC: 23 MG/DL
WBC # BLD: 15.2 K/UL (ref 4–11)
WBC # BLD: 23 K/UL (ref 4–11)
WBC # BLD: 24.1 K/UL (ref 4–11)
WBC # BLD: 24.2 K/UL (ref 4–11)
WBC # BLD: 24.7 K/UL (ref 4–11)
WBC # BLD: 25.8 K/UL (ref 4–11)
WBC # BLD: 27.2 K/UL (ref 4–11)
WBC # BLD: 27.8 K/UL (ref 4–11)
WBC # BLD: 28.6 K/UL (ref 4–11)
WBC # BLD: 35.9 K/UL (ref 4–11)
WBC # BLD: 5.4 K/UL (ref 4–11)
WBC UA: ABNORMAL /HPF (ref 0–5)

## 2020-01-01 PROCEDURE — 99223 1ST HOSP IP/OBS HIGH 75: CPT | Performed by: INTERNAL MEDICINE

## 2020-01-01 PROCEDURE — C1769 GUIDE WIRE: HCPCS

## 2020-01-01 PROCEDURE — 94750 HC PULMONARY COMPLIANCE STUDY: CPT

## 2020-01-01 PROCEDURE — 2580000003 HC RX 258: Performed by: STUDENT IN AN ORGANIZED HEALTH CARE EDUCATION/TRAINING PROGRAM

## 2020-01-01 PROCEDURE — 2500000003 HC RX 250 WO HCPCS: Performed by: STUDENT IN AN ORGANIZED HEALTH CARE EDUCATION/TRAINING PROGRAM

## 2020-01-01 PROCEDURE — 74177 CT ABD & PELVIS W/CONTRAST: CPT

## 2020-01-01 PROCEDURE — 94640 AIRWAY INHALATION TREATMENT: CPT

## 2020-01-01 PROCEDURE — 2060000000 HC ICU INTERMEDIATE R&B

## 2020-01-01 PROCEDURE — P9047 ALBUMIN (HUMAN), 25%, 50ML: HCPCS | Performed by: STUDENT IN AN ORGANIZED HEALTH CARE EDUCATION/TRAINING PROGRAM

## 2020-01-01 PROCEDURE — 6360000002 HC RX W HCPCS: Performed by: INTERNAL MEDICINE

## 2020-01-01 PROCEDURE — 85610 PROTHROMBIN TIME: CPT

## 2020-01-01 PROCEDURE — 71045 X-RAY EXAM CHEST 1 VIEW: CPT

## 2020-01-01 PROCEDURE — 80076 HEPATIC FUNCTION PANEL: CPT

## 2020-01-01 PROCEDURE — 76937 US GUIDE VASCULAR ACCESS: CPT | Performed by: SURGERY

## 2020-01-01 PROCEDURE — 6360000002 HC RX W HCPCS: Performed by: STUDENT IN AN ORGANIZED HEALTH CARE EDUCATION/TRAINING PROGRAM

## 2020-01-01 PROCEDURE — 2000000000 HC ICU R&B

## 2020-01-01 PROCEDURE — 2500000003 HC RX 250 WO HCPCS

## 2020-01-01 PROCEDURE — U0003 INFECTIOUS AGENT DETECTION BY NUCLEIC ACID (DNA OR RNA); SEVERE ACUTE RESPIRATORY SYNDROME CORONAVIRUS 2 (SARS-COV-2) (CORONAVIRUS DISEASE [COVID-19]), AMPLIFIED PROBE TECHNIQUE, MAKING USE OF HIGH THROUGHPUT TECHNOLOGIES AS DESCRIBED BY CMS-2020-01-R: HCPCS

## 2020-01-01 PROCEDURE — 84295 ASSAY OF SERUM SODIUM: CPT

## 2020-01-01 PROCEDURE — 51702 INSERT TEMP BLADDER CATH: CPT

## 2020-01-01 PROCEDURE — 71275 CT ANGIOGRAPHY CHEST: CPT

## 2020-01-01 PROCEDURE — 84155 ASSAY OF PROTEIN SERUM: CPT

## 2020-01-01 PROCEDURE — 31500 INSERT EMERGENCY AIRWAY: CPT | Performed by: INTERNAL MEDICINE

## 2020-01-01 PROCEDURE — 94761 N-INVAS EAR/PLS OXIMETRY MLT: CPT

## 2020-01-01 PROCEDURE — 82150 ASSAY OF AMYLASE: CPT

## 2020-01-01 PROCEDURE — B31T1ZZ FLUOROSCOPY OF LEFT PULMONARY ARTERY USING LOW OSMOLAR CONTRAST: ICD-10-PCS | Performed by: RADIOLOGY

## 2020-01-01 PROCEDURE — C1894 INTRO/SHEATH, NON-LASER: HCPCS

## 2020-01-01 PROCEDURE — 6370000000 HC RX 637 (ALT 250 FOR IP): Performed by: STUDENT IN AN ORGANIZED HEALTH CARE EDUCATION/TRAINING PROGRAM

## 2020-01-01 PROCEDURE — 82947 ASSAY GLUCOSE BLOOD QUANT: CPT

## 2020-01-01 PROCEDURE — 82330 ASSAY OF CALCIUM: CPT

## 2020-01-01 PROCEDURE — C1788 PORT, INDWELLING, IMP: HCPCS

## 2020-01-01 PROCEDURE — 94770 HC ETCO2 MONITOR DAILY: CPT

## 2020-01-01 PROCEDURE — 04HY32Z INSERTION OF MONITORING DEVICE INTO LOWER ARTERY, PERCUTANEOUS APPROACH: ICD-10-PCS | Performed by: INTERNAL MEDICINE

## 2020-01-01 PROCEDURE — 82040 ASSAY OF SERUM ALBUMIN: CPT

## 2020-01-01 PROCEDURE — 94660 CPAP INITIATION&MGMT: CPT

## 2020-01-01 PROCEDURE — 2700000000 HC OXYGEN THERAPY PER DAY

## 2020-01-01 PROCEDURE — 93975 VASCULAR STUDY: CPT

## 2020-01-01 PROCEDURE — 85730 THROMBOPLASTIN TIME PARTIAL: CPT

## 2020-01-01 PROCEDURE — 5A2204Z RESTORATION OF CARDIAC RHYTHM, SINGLE: ICD-10-PCS | Performed by: INTERNAL MEDICINE

## 2020-01-01 PROCEDURE — 2580000003 HC RX 258: Performed by: OBSTETRICS & GYNECOLOGY

## 2020-01-01 PROCEDURE — 2580000003 HC RX 258: Performed by: INTERNAL MEDICINE

## 2020-01-01 PROCEDURE — 82533 TOTAL CORTISOL: CPT

## 2020-01-01 PROCEDURE — 02HR33Z INSERTION OF INFUSION DEVICE INTO LEFT PULMONARY ARTERY, PERCUTANEOUS APPROACH: ICD-10-PCS | Performed by: RADIOLOGY

## 2020-01-01 PROCEDURE — 83735 ASSAY OF MAGNESIUM: CPT

## 2020-01-01 PROCEDURE — 85025 COMPLETE CBC W/AUTO DIFF WBC: CPT

## 2020-01-01 PROCEDURE — 36415 COLL VENOUS BLD VENIPUNCTURE: CPT

## 2020-01-01 PROCEDURE — 83880 ASSAY OF NATRIURETIC PEPTIDE: CPT

## 2020-01-01 PROCEDURE — 80061 LIPID PANEL: CPT

## 2020-01-01 PROCEDURE — 84100 ASSAY OF PHOSPHORUS: CPT

## 2020-01-01 PROCEDURE — 5A1D90Z PERFORMANCE OF URINARY FILTRATION, CONTINUOUS, GREATER THAN 18 HOURS PER DAY: ICD-10-PCS | Performed by: INTERNAL MEDICINE

## 2020-01-01 PROCEDURE — 99153 MOD SED SAME PHYS/QHP EA: CPT | Performed by: RADIOLOGY

## 2020-01-01 PROCEDURE — 87641 MR-STAPH DNA AMP PROBE: CPT

## 2020-01-01 PROCEDURE — 85384 FIBRINOGEN ACTIVITY: CPT

## 2020-01-01 PROCEDURE — G0180 MD CERTIFICATION HHA PATIENT: HCPCS | Performed by: FAMILY MEDICINE

## 2020-01-01 PROCEDURE — 84484 ASSAY OF TROPONIN QUANT: CPT

## 2020-01-01 PROCEDURE — 89051 BODY FLUID CELL COUNT: CPT

## 2020-01-01 PROCEDURE — 36590 REMOVAL TUNNELED CV CATH: CPT | Performed by: RADIOLOGY

## 2020-01-01 PROCEDURE — 36556 INSERT NON-TUNNEL CV CATH: CPT

## 2020-01-01 PROCEDURE — 99291 CRITICAL CARE FIRST HOUR: CPT | Performed by: INTERNAL MEDICINE

## 2020-01-01 PROCEDURE — 83930 ASSAY OF BLOOD OSMOLALITY: CPT

## 2020-01-01 PROCEDURE — 85520 HEPARIN ASSAY: CPT

## 2020-01-01 PROCEDURE — C1751 CATH, INF, PER/CENT/MIDLINE: HCPCS

## 2020-01-01 PROCEDURE — 36556 INSERT NON-TUNNEL CV CATH: CPT | Performed by: INTERNAL MEDICINE

## 2020-01-01 PROCEDURE — 99233 SBSQ HOSP IP/OBS HIGH 50: CPT | Performed by: INTERNAL MEDICINE

## 2020-01-01 PROCEDURE — 49083 ABD PARACENTESIS W/IMAGING: CPT

## 2020-01-01 PROCEDURE — 36561 INSERT TUNNELED CV CATH: CPT

## 2020-01-01 PROCEDURE — 99152 MOD SED SAME PHYS/QHP 5/>YRS: CPT | Performed by: RADIOLOGY

## 2020-01-01 PROCEDURE — 92950 HEART/LUNG RESUSCITATION CPR: CPT

## 2020-01-01 PROCEDURE — 99203 OFFICE O/P NEW LOW 30 MIN: CPT | Performed by: FAMILY MEDICINE

## 2020-01-01 PROCEDURE — 85230 CLOT FACTOR VII PROCONVERTIN: CPT

## 2020-01-01 PROCEDURE — 37799 UNLISTED PX VASCULAR SURGERY: CPT

## 2020-01-01 PROCEDURE — 02HQ33Z INSERTION OF INFUSION DEVICE INTO RIGHT PULMONARY ARTERY, PERCUTANEOUS APPROACH: ICD-10-PCS | Performed by: RADIOLOGY

## 2020-01-01 PROCEDURE — 36014 PLACE CATHETER IN ARTERY: CPT | Performed by: RADIOLOGY

## 2020-01-01 PROCEDURE — 87077 CULTURE AEROBIC IDENTIFY: CPT

## 2020-01-01 PROCEDURE — 80069 RENAL FUNCTION PANEL: CPT

## 2020-01-01 PROCEDURE — 99285 EMERGENCY DEPT VISIT HI MDM: CPT

## 2020-01-01 PROCEDURE — 83883 ASSAY NEPHELOMETRY NOT SPEC: CPT

## 2020-01-01 PROCEDURE — 93005 ELECTROCARDIOGRAM TRACING: CPT | Performed by: EMERGENCY MEDICINE

## 2020-01-01 PROCEDURE — 36600 WITHDRAWAL OF ARTERIAL BLOOD: CPT

## 2020-01-01 PROCEDURE — 93010 ELECTROCARDIOGRAM REPORT: CPT | Performed by: INTERNAL MEDICINE

## 2020-01-01 PROCEDURE — 06HM33Z INSERTION OF INFUSION DEVICE INTO RIGHT FEMORAL VEIN, PERCUTANEOUS APPROACH: ICD-10-PCS | Performed by: INTERNAL MEDICINE

## 2020-01-01 PROCEDURE — 94002 VENT MGMT INPAT INIT DAY: CPT

## 2020-01-01 PROCEDURE — 80048 BASIC METABOLIC PNL TOTAL CA: CPT

## 2020-01-01 PROCEDURE — 80074 ACUTE HEPATITIS PANEL: CPT

## 2020-01-01 PROCEDURE — 86022 PLATELET ANTIBODIES: CPT

## 2020-01-01 PROCEDURE — 83605 ASSAY OF LACTIC ACID: CPT

## 2020-01-01 PROCEDURE — 77001 FLUOROGUIDE FOR VEIN DEVICE: CPT | Performed by: RADIOLOGY

## 2020-01-01 PROCEDURE — 51798 US URINE CAPACITY MEASURE: CPT

## 2020-01-01 PROCEDURE — 93005 ELECTROCARDIOGRAM TRACING: CPT | Performed by: STUDENT IN AN ORGANIZED HEALTH CARE EDUCATION/TRAINING PROGRAM

## 2020-01-01 PROCEDURE — 86038 ANTINUCLEAR ANTIBODIES: CPT

## 2020-01-01 PROCEDURE — 6370000000 HC RX 637 (ALT 250 FOR IP): Performed by: INTERNAL MEDICINE

## 2020-01-01 PROCEDURE — 84300 ASSAY OF URINE SODIUM: CPT

## 2020-01-01 PROCEDURE — C1887 CATHETER, GUIDING: HCPCS

## 2020-01-01 PROCEDURE — 36556 INSERT NON-TUNNEL CV CATH: CPT | Performed by: SURGERY

## 2020-01-01 PROCEDURE — 6360000004 HC RX CONTRAST MEDICATION: Performed by: FAMILY MEDICINE

## 2020-01-01 PROCEDURE — 83516 IMMUNOASSAY NONANTIBODY: CPT

## 2020-01-01 PROCEDURE — 82803 BLOOD GASES ANY COMBINATION: CPT

## 2020-01-01 PROCEDURE — 84132 ASSAY OF SERUM POTASSIUM: CPT

## 2020-01-01 PROCEDURE — 88112 CYTOPATH CELL ENHANCE TECH: CPT

## 2020-01-01 PROCEDURE — 6360000002 HC RX W HCPCS

## 2020-01-01 PROCEDURE — 94003 VENT MGMT INPAT SUBQ DAY: CPT

## 2020-01-01 PROCEDURE — 99255 IP/OBS CONSLTJ NEW/EST HI 80: CPT | Performed by: INTERNAL MEDICINE

## 2020-01-01 PROCEDURE — 99211 OFF/OP EST MAY X REQ PHY/QHP: CPT | Performed by: NURSE PRACTITIONER

## 2020-01-01 PROCEDURE — 83615 LACTATE (LD) (LDH) ENZYME: CPT

## 2020-01-01 PROCEDURE — 75743 ARTERY X-RAYS LUNGS: CPT | Performed by: RADIOLOGY

## 2020-01-01 PROCEDURE — 49083 ABD PARACENTESIS W/IMAGING: CPT | Performed by: INTERNAL MEDICINE

## 2020-01-01 PROCEDURE — 99222 1ST HOSP IP/OBS MODERATE 55: CPT | Performed by: SURGERY

## 2020-01-01 PROCEDURE — 0BH18EZ INSERTION OF ENDOTRACHEAL AIRWAY INTO TRACHEA, VIA NATURAL OR ARTIFICIAL OPENING ENDOSCOPIC: ICD-10-PCS | Performed by: INTERNAL MEDICINE

## 2020-01-01 PROCEDURE — 88305 TISSUE EXAM BY PATHOLOGIST: CPT

## 2020-01-01 PROCEDURE — 87205 SMEAR GRAM STAIN: CPT

## 2020-01-01 PROCEDURE — 86235 NUCLEAR ANTIGEN ANTIBODY: CPT

## 2020-01-01 PROCEDURE — 6360000002 HC RX W HCPCS: Performed by: RADIOLOGY

## 2020-01-01 PROCEDURE — 85220 BLOOC CLOT FACTOR V TEST: CPT

## 2020-01-01 PROCEDURE — 87070 CULTURE OTHR SPECIMN AEROBIC: CPT

## 2020-01-01 PROCEDURE — 3E043XZ INTRODUCTION OF VASOPRESSOR INTO CENTRAL VEIN, PERCUTANEOUS APPROACH: ICD-10-PCS | Performed by: INTERNAL MEDICINE

## 2020-01-01 PROCEDURE — 84157 ASSAY OF PROTEIN OTHER: CPT

## 2020-01-01 PROCEDURE — 6360000004 HC RX CONTRAST MEDICATION: Performed by: EMERGENCY MEDICINE

## 2020-01-01 PROCEDURE — 82042 OTHER SOURCE ALBUMIN QUAN EA: CPT

## 2020-01-01 PROCEDURE — 6360000002 HC RX W HCPCS: Performed by: OBSTETRICS & GYNECOLOGY

## 2020-01-01 PROCEDURE — 2500000003 HC RX 250 WO HCPCS: Performed by: INTERNAL MEDICINE

## 2020-01-01 PROCEDURE — 0W9G3ZZ DRAINAGE OF PERITONEAL CAVITY, PERCUTANEOUS APPROACH: ICD-10-PCS | Performed by: INTERNAL MEDICINE

## 2020-01-01 PROCEDURE — 83010 ASSAY OF HAPTOGLOBIN QUANT: CPT

## 2020-01-01 PROCEDURE — 37211 THROMBOLYTIC ART THERAPY: CPT | Performed by: RADIOLOGY

## 2020-01-01 PROCEDURE — 87186 SC STD MICRODIL/AGAR DIL: CPT

## 2020-01-01 PROCEDURE — 85027 COMPLETE CBC AUTOMATED: CPT

## 2020-01-01 PROCEDURE — 99254 IP/OBS CNSLTJ NEW/EST MOD 60: CPT | Performed by: INTERNAL MEDICINE

## 2020-01-01 PROCEDURE — 36620 INSERTION CATHETER ARTERY: CPT | Performed by: INTERNAL MEDICINE

## 2020-01-01 PROCEDURE — 87040 BLOOD CULTURE FOR BACTERIA: CPT

## 2020-01-01 PROCEDURE — 5A12012 PERFORMANCE OF CARDIAC OUTPUT, SINGLE, MANUAL: ICD-10-PCS | Performed by: INTERNAL MEDICINE

## 2020-01-01 PROCEDURE — 2709999900 HC NON-CHARGEABLE SUPPLY

## 2020-01-01 PROCEDURE — 83935 ASSAY OF URINE OSMOLALITY: CPT

## 2020-01-01 PROCEDURE — 5A1935Z RESPIRATORY VENTILATION, LESS THAN 24 CONSECUTIVE HOURS: ICD-10-PCS | Performed by: INTERNAL MEDICINE

## 2020-01-01 PROCEDURE — 2580000003 HC RX 258

## 2020-01-01 PROCEDURE — 81001 URINALYSIS AUTO W/SCOPE: CPT

## 2020-01-01 PROCEDURE — 99152 MOD SED SAME PHYS/QHP 5/>YRS: CPT

## 2020-01-01 PROCEDURE — 2580000003 HC RX 258: Performed by: RADIOLOGY

## 2020-01-01 PROCEDURE — 05HN33Z INSERTION OF INFUSION DEVICE INTO LEFT INTERNAL JUGULAR VEIN, PERCUTANEOUS APPROACH: ICD-10-PCS | Performed by: SURGERY

## 2020-01-01 PROCEDURE — 76937 US GUIDE VASCULAR ACCESS: CPT

## 2020-01-01 PROCEDURE — B31S1ZZ FLUOROSCOPY OF RIGHT PULMONARY ARTERY USING LOW OSMOLAR CONTRAST: ICD-10-PCS | Performed by: RADIOLOGY

## 2020-01-01 PROCEDURE — 85379 FIBRIN DEGRADATION QUANT: CPT

## 2020-01-01 PROCEDURE — 94664 DEMO&/EVAL PT USE INHALER: CPT

## 2020-01-01 PROCEDURE — 90945 DIALYSIS ONE EVALUATION: CPT

## 2020-01-01 PROCEDURE — 85240 CLOT FACTOR VIII AHG 1 STAGE: CPT

## 2020-01-01 PROCEDURE — 6360000004 HC RX CONTRAST MEDICATION: Performed by: STUDENT IN AN ORGANIZED HEALTH CARE EDUCATION/TRAINING PROGRAM

## 2020-01-01 PROCEDURE — 77001 FLUOROGUIDE FOR VEIN DEVICE: CPT

## 2020-01-01 PROCEDURE — 82945 GLUCOSE OTHER FLUID: CPT

## 2020-01-01 PROCEDURE — 82570 ASSAY OF URINE CREATININE: CPT

## 2020-01-01 PROCEDURE — 89220 SPUTUM SPECIMEN COLLECTION: CPT

## 2020-01-01 PROCEDURE — 94150 VITAL CAPACITY TEST: CPT

## 2020-01-01 PROCEDURE — 99153 MOD SED SAME PHYS/QHP EA: CPT

## 2020-01-01 PROCEDURE — 3E06317 INTRODUCTION OF OTHER THROMBOLYTIC INTO CENTRAL ARTERY, PERCUTANEOUS APPROACH: ICD-10-PCS | Performed by: RADIOLOGY

## 2020-01-01 PROCEDURE — 76705 ECHO EXAM OF ABDOMEN: CPT

## 2020-01-01 PROCEDURE — 84550 ASSAY OF BLOOD/URIC ACID: CPT

## 2020-01-01 RX ORDER — ACETAMINOPHEN 650 MG/1
650 SUPPOSITORY RECTAL EVERY 6 HOURS PRN
Status: DISCONTINUED | OUTPATIENT
Start: 2020-01-01 | End: 2020-01-01

## 2020-01-01 RX ORDER — CALCIUM CHLORIDE 100 MG/ML
INJECTION INTRAVENOUS; INTRAVENTRICULAR
Status: COMPLETED | OUTPATIENT
Start: 2020-01-01 | End: 2020-01-01

## 2020-01-01 RX ORDER — DEXTROSE MONOHYDRATE 25 G/50ML
25 INJECTION, SOLUTION INTRAVENOUS ONCE
Status: COMPLETED | OUTPATIENT
Start: 2020-01-01 | End: 2020-01-01

## 2020-01-01 RX ORDER — SODIUM CHLORIDE 0.9 % (FLUSH) 0.9 %
10 SYRINGE (ML) INJECTION EVERY 12 HOURS SCHEDULED
Status: DISCONTINUED | OUTPATIENT
Start: 2020-01-01 | End: 2020-01-01 | Stop reason: SDUPTHER

## 2020-01-01 RX ORDER — SODIUM CHLORIDE 0.9 % (FLUSH) 0.9 %
10 SYRINGE (ML) INJECTION EVERY 12 HOURS SCHEDULED
Status: DISCONTINUED | OUTPATIENT
Start: 2020-01-01 | End: 2020-01-01 | Stop reason: HOSPADM

## 2020-01-01 RX ORDER — SULFAMETHOXAZOLE AND TRIMETHOPRIM 800; 160 MG/1; MG/1
1 TABLET ORAL 2 TIMES DAILY
Qty: 10 TABLET | Refills: 0 | Status: SHIPPED | OUTPATIENT
Start: 2020-01-01 | End: 2020-01-01

## 2020-01-01 RX ORDER — ETOMIDATE 2 MG/ML
INJECTION INTRAVENOUS
Status: COMPLETED
Start: 2020-01-01 | End: 2020-01-01

## 2020-01-01 RX ORDER — MORPHINE SULFATE 2 MG/ML
1 INJECTION, SOLUTION INTRAMUSCULAR; INTRAVENOUS EVERY 4 HOURS PRN
Status: DISCONTINUED | OUTPATIENT
Start: 2020-01-01 | End: 2020-01-01 | Stop reason: HOSPADM

## 2020-01-01 RX ORDER — SODIUM CHLORIDE 9 MG/ML
INJECTION, SOLUTION INTRAVENOUS CONTINUOUS PRN
Status: ACTIVE | OUTPATIENT
Start: 2020-01-01 | End: 2020-01-01

## 2020-01-01 RX ORDER — HEPARIN SODIUM 1000 [USP'U]/ML
80 INJECTION, SOLUTION INTRAVENOUS; SUBCUTANEOUS PRN
Status: DISCONTINUED | OUTPATIENT
Start: 2020-01-01 | End: 2020-01-01

## 2020-01-01 RX ORDER — ALBUMIN (HUMAN) 12.5 G/50ML
25 SOLUTION INTRAVENOUS EVERY 6 HOURS
Status: DISCONTINUED | OUTPATIENT
Start: 2020-01-01 | End: 2020-01-01 | Stop reason: HOSPADM

## 2020-01-01 RX ORDER — DEXTROSE MONOHYDRATE 50 MG/ML
INJECTION, SOLUTION INTRAVENOUS
Status: COMPLETED
Start: 2020-01-01 | End: 2020-01-01

## 2020-01-01 RX ORDER — ACETAMINOPHEN 325 MG/1
650 TABLET ORAL EVERY 6 HOURS PRN
Status: DISCONTINUED | OUTPATIENT
Start: 2020-01-01 | End: 2020-01-01

## 2020-01-01 RX ORDER — SODIUM CITRATE 4 % (5 ML)
5 SYRINGE (ML) MISCELLANEOUS ONCE
Status: COMPLETED | OUTPATIENT
Start: 2020-01-01 | End: 2020-01-01

## 2020-01-01 RX ORDER — ALBUTEROL SULFATE 90 UG/1
2 AEROSOL, METERED RESPIRATORY (INHALATION) EVERY 6 HOURS PRN
Status: DISCONTINUED | OUTPATIENT
Start: 2020-01-01 | End: 2020-01-01

## 2020-01-01 RX ORDER — EPINEPHRINE 0.1 MG/ML
SYRINGE (ML) INJECTION
Status: COMPLETED | OUTPATIENT
Start: 2020-01-01 | End: 2020-01-01

## 2020-01-01 RX ORDER — ACETAMINOPHEN 325 MG/1
650 TABLET ORAL EVERY 4 HOURS PRN
Status: DISCONTINUED | OUTPATIENT
Start: 2020-01-01 | End: 2020-01-01 | Stop reason: SDUPTHER

## 2020-01-01 RX ORDER — MAGNESIUM SULFATE 1 G/100ML
1 INJECTION INTRAVENOUS PRN
Status: DISCONTINUED | OUTPATIENT
Start: 2020-01-01 | End: 2020-01-01 | Stop reason: HOSPADM

## 2020-01-01 RX ORDER — DEXTROSE MONOHYDRATE 25 G/50ML
12.5 INJECTION, SOLUTION INTRAVENOUS PRN
Status: DISCONTINUED | OUTPATIENT
Start: 2020-01-01 | End: 2020-01-01 | Stop reason: HOSPADM

## 2020-01-01 RX ORDER — DEXTROSE MONOHYDRATE 25 G/50ML
25 INJECTION, SOLUTION INTRAVENOUS ONCE
Status: DISCONTINUED | OUTPATIENT
Start: 2020-01-01 | End: 2020-01-01 | Stop reason: HOSPADM

## 2020-01-01 RX ORDER — FOLIC ACID 1 MG/1
1 TABLET ORAL DAILY
COMMUNITY
Start: 2020-01-01

## 2020-01-01 RX ORDER — SODIUM CHLORIDE 9 MG/ML
INJECTION, SOLUTION INTRAVENOUS
Status: DISCONTINUED
Start: 2020-01-01 | End: 2020-01-01 | Stop reason: HOSPADM

## 2020-01-01 RX ORDER — MAGNESIUM SULFATE 1 G/100ML
1 INJECTION INTRAVENOUS PRN
Status: DISCONTINUED | OUTPATIENT
Start: 2020-01-01 | End: 2020-01-01 | Stop reason: SDUPTHER

## 2020-01-01 RX ORDER — CALCIUM GLUCONATE 94 MG/ML
1 INJECTION, SOLUTION INTRAVENOUS ONCE
Status: COMPLETED | OUTPATIENT
Start: 2020-01-01 | End: 2020-01-01

## 2020-01-01 RX ORDER — POTASSIUM CHLORIDE 29.8 MG/ML
20 INJECTION INTRAVENOUS PRN
Status: DISCONTINUED | OUTPATIENT
Start: 2020-01-01 | End: 2020-01-01 | Stop reason: HOSPADM

## 2020-01-01 RX ORDER — IPRATROPIUM BROMIDE AND ALBUTEROL SULFATE 2.5; .5 MG/3ML; MG/3ML
1 SOLUTION RESPIRATORY (INHALATION) EVERY 4 HOURS PRN
Status: DISCONTINUED | OUTPATIENT
Start: 2020-01-01 | End: 2020-01-01 | Stop reason: SDUPTHER

## 2020-01-01 RX ORDER — FUROSEMIDE 10 MG/ML
40 INJECTION INTRAMUSCULAR; INTRAVENOUS 2 TIMES DAILY
Status: DISCONTINUED | OUTPATIENT
Start: 2020-01-01 | End: 2020-01-01

## 2020-01-01 RX ORDER — FUROSEMIDE 10 MG/ML
20 INJECTION INTRAMUSCULAR; INTRAVENOUS ONCE
Status: COMPLETED | OUTPATIENT
Start: 2020-01-01 | End: 2020-01-01

## 2020-01-01 RX ORDER — FUROSEMIDE 10 MG/ML
40 INJECTION INTRAMUSCULAR; INTRAVENOUS ONCE
Status: DISCONTINUED | OUTPATIENT
Start: 2020-01-01 | End: 2020-01-01

## 2020-01-01 RX ORDER — COSYNTROPIN 0.25 MG/ML
250 INJECTION, POWDER, FOR SOLUTION INTRAMUSCULAR; INTRAVENOUS ONCE
Status: CANCELLED | OUTPATIENT
Start: 2020-01-01 | End: 2020-01-01

## 2020-01-01 RX ORDER — FUROSEMIDE 10 MG/ML
40 INJECTION INTRAMUSCULAR; INTRAVENOUS ONCE
Status: COMPLETED | OUTPATIENT
Start: 2020-01-01 | End: 2020-01-01

## 2020-01-01 RX ORDER — PREDNISONE 20 MG/1
40 TABLET ORAL DAILY
Status: DISCONTINUED | OUTPATIENT
Start: 2020-01-01 | End: 2020-01-01

## 2020-01-01 RX ORDER — LANOLIN ALCOHOL/MO/W.PET/CERES
6 CREAM (GRAM) TOPICAL NIGHTLY PRN
Status: DISCONTINUED | OUTPATIENT
Start: 2020-01-01 | End: 2020-01-01

## 2020-01-01 RX ORDER — SODIUM CHLORIDE 0.9 % (FLUSH) 0.9 %
10 SYRINGE (ML) INJECTION PRN
Status: DISCONTINUED | OUTPATIENT
Start: 2020-01-01 | End: 2020-01-01 | Stop reason: HOSPADM

## 2020-01-01 RX ORDER — ALBUTEROL SULFATE 90 UG/1
2 AEROSOL, METERED RESPIRATORY (INHALATION) 4 TIMES DAILY
Status: DISCONTINUED | OUTPATIENT
Start: 2020-01-01 | End: 2020-01-01

## 2020-01-01 RX ORDER — CALCIUM GLUCONATE 94 MG/ML
INJECTION, SOLUTION INTRAVENOUS
Status: DISCONTINUED
Start: 2020-01-01 | End: 2020-01-01 | Stop reason: SDUPTHER

## 2020-01-01 RX ORDER — POLYETHYLENE GLYCOL 3350 17 G/17G
17 POWDER, FOR SOLUTION ORAL DAILY PRN
Status: DISCONTINUED | OUTPATIENT
Start: 2020-01-01 | End: 2020-01-01 | Stop reason: HOSPADM

## 2020-01-01 RX ORDER — LANOLIN ALCOHOL/MO/W.PET/CERES
6 CREAM (GRAM) TOPICAL NIGHTLY PRN
Status: DISCONTINUED | OUTPATIENT
Start: 2020-01-01 | End: 2020-01-01 | Stop reason: HOSPADM

## 2020-01-01 RX ORDER — HEPARIN SODIUM 10000 [USP'U]/100ML
23 INJECTION, SOLUTION INTRAVENOUS CONTINUOUS
Status: DISCONTINUED | OUTPATIENT
Start: 2020-01-01 | End: 2020-01-01

## 2020-01-01 RX ORDER — PROPOFOL 10 MG/ML
INJECTION, EMULSION INTRAVENOUS
Status: COMPLETED
Start: 2020-01-01 | End: 2020-01-01

## 2020-01-01 RX ORDER — SODIUM CHLORIDE 9 MG/ML
INJECTION, SOLUTION INTRAVENOUS CONTINUOUS
Status: DISCONTINUED | OUTPATIENT
Start: 2020-01-01 | End: 2020-01-01

## 2020-01-01 RX ORDER — NOREPINEPHRINE BITARTRATE 1 MG/ML
INJECTION, SOLUTION INTRAVENOUS
Status: COMPLETED
Start: 2020-01-01 | End: 2020-01-01

## 2020-01-01 RX ORDER — SODIUM CHLORIDE 9 MG/ML
INJECTION, SOLUTION INTRAVENOUS
Status: COMPLETED
Start: 2020-01-01 | End: 2020-01-01

## 2020-01-01 RX ORDER — NICOTINE POLACRILEX 4 MG
15 LOZENGE BUCCAL PRN
Status: DISCONTINUED | OUTPATIENT
Start: 2020-01-01 | End: 2020-01-01 | Stop reason: HOSPADM

## 2020-01-01 RX ORDER — DEXTROSE MONOHYDRATE 25 G/50ML
INJECTION, SOLUTION INTRAVENOUS
Status: COMPLETED
Start: 2020-01-01 | End: 2020-01-01

## 2020-01-01 RX ORDER — ONDANSETRON 2 MG/ML
4 INJECTION INTRAMUSCULAR; INTRAVENOUS EVERY 6 HOURS PRN
Status: DISCONTINUED | OUTPATIENT
Start: 2020-01-01 | End: 2020-01-01 | Stop reason: HOSPADM

## 2020-01-01 RX ORDER — ONDANSETRON 2 MG/ML
4 INJECTION INTRAMUSCULAR; INTRAVENOUS EVERY 6 HOURS PRN
Status: DISCONTINUED | OUTPATIENT
Start: 2020-01-01 | End: 2020-01-01 | Stop reason: SDUPTHER

## 2020-01-01 RX ORDER — CALCIUM GLUCONATE 94 MG/ML
1 INJECTION, SOLUTION INTRAVENOUS ONCE
Status: DISCONTINUED | OUTPATIENT
Start: 2020-01-01 | End: 2020-01-01 | Stop reason: HOSPADM

## 2020-01-01 RX ORDER — SODIUM CHLORIDE 0.9 % (FLUSH) 0.9 %
10 SYRINGE (ML) INJECTION PRN
Status: DISCONTINUED | OUTPATIENT
Start: 2020-01-01 | End: 2020-01-01 | Stop reason: SDUPTHER

## 2020-01-01 RX ORDER — HEPARIN SODIUM 1000 [USP'U]/ML
40 INJECTION, SOLUTION INTRAVENOUS; SUBCUTANEOUS PRN
Status: DISCONTINUED | OUTPATIENT
Start: 2020-01-01 | End: 2020-01-01

## 2020-01-01 RX ORDER — FUROSEMIDE 10 MG/ML
40 INJECTION INTRAMUSCULAR; INTRAVENOUS 2 TIMES DAILY
Status: COMPLETED | OUTPATIENT
Start: 2020-01-01 | End: 2020-01-01

## 2020-01-01 RX ORDER — PROMETHAZINE HYDROCHLORIDE 25 MG/1
12.5 TABLET ORAL EVERY 6 HOURS PRN
Status: DISCONTINUED | OUTPATIENT
Start: 2020-01-01 | End: 2020-01-01 | Stop reason: HOSPADM

## 2020-01-01 RX ORDER — IPRATROPIUM BROMIDE AND ALBUTEROL SULFATE 2.5; .5 MG/3ML; MG/3ML
1 SOLUTION RESPIRATORY (INHALATION)
Status: DISCONTINUED | OUTPATIENT
Start: 2020-01-01 | End: 2020-01-01

## 2020-01-01 RX ORDER — PROPOFOL 10 MG/ML
10 INJECTION, EMULSION INTRAVENOUS
Status: DISCONTINUED | OUTPATIENT
Start: 2020-01-01 | End: 2020-01-01 | Stop reason: HOSPADM

## 2020-01-01 RX ORDER — DEXTROSE MONOHYDRATE 50 MG/ML
100 INJECTION, SOLUTION INTRAVENOUS PRN
Status: DISCONTINUED | OUTPATIENT
Start: 2020-01-01 | End: 2020-01-01 | Stop reason: HOSPADM

## 2020-01-01 RX ORDER — GUAIFENESIN/DEXTROMETHORPHAN 100-10MG/5
5 SYRUP ORAL EVERY 4 HOURS PRN
Status: DISCONTINUED | OUTPATIENT
Start: 2020-01-01 | End: 2020-01-01 | Stop reason: HOSPADM

## 2020-01-01 RX ADMIN — ALBUMIN (HUMAN) 25 G: 0.25 INJECTION, SOLUTION INTRAVENOUS at 04:02

## 2020-01-01 RX ADMIN — Medication 5 ML: at 21:54

## 2020-01-01 RX ADMIN — Medication 10 ML: at 21:01

## 2020-01-01 RX ADMIN — MEROPENEM 1 G: 1 INJECTION, POWDER, FOR SOLUTION INTRAVENOUS at 00:10

## 2020-01-01 RX ADMIN — ALBUTEROL SULFATE 2 PUFF: 90 AEROSOL, METERED RESPIRATORY (INHALATION) at 09:09

## 2020-01-01 RX ADMIN — FUROSEMIDE 10 MG/HR: 10 INJECTION, SOLUTION INTRAMUSCULAR; INTRAVENOUS at 14:22

## 2020-01-01 RX ADMIN — IOPAMIDOL 80 ML: 755 INJECTION, SOLUTION INTRAVENOUS at 16:34

## 2020-01-01 RX ADMIN — GUAIFENESIN AND DEXTROMETHORPHAN 5 ML: 100; 10 SYRUP ORAL at 17:41

## 2020-01-01 RX ADMIN — ALBUMIN (HUMAN) 25 G: 0.25 INJECTION, SOLUTION INTRAVENOUS at 21:01

## 2020-01-01 RX ADMIN — ALBUTEROL SULFATE 2 PUFF: 90 AEROSOL, METERED RESPIRATORY (INHALATION) at 09:07

## 2020-01-01 RX ADMIN — ALBUTEROL SULFATE 2 PUFF: 90 AEROSOL, METERED RESPIRATORY (INHALATION) at 20:14

## 2020-01-01 RX ADMIN — MEROPENEM 1 G: 1 INJECTION, POWDER, FOR SOLUTION INTRAVENOUS at 08:02

## 2020-01-01 RX ADMIN — Medication: at 01:49

## 2020-01-01 RX ADMIN — Medication 6 MG: at 01:31

## 2020-01-01 RX ADMIN — HEPARIN SODIUM 8530 UNITS: 1000 INJECTION INTRAVENOUS; SUBCUTANEOUS at 17:20

## 2020-01-01 RX ADMIN — ETOMIDATE 20 MG: 20 INJECTION, SOLUTION INTRAVENOUS at 16:26

## 2020-01-01 RX ADMIN — INSULIN HUMAN 10 UNITS: 100 INJECTION, SOLUTION PARENTERAL at 22:00

## 2020-01-01 RX ADMIN — ALBUTEROL SULFATE 2 PUFF: 90 AEROSOL, METERED RESPIRATORY (INHALATION) at 23:21

## 2020-01-01 RX ADMIN — IPRATROPIUM BROMIDE AND ALBUTEROL SULFATE 1 AMPULE: .5; 3 SOLUTION RESPIRATORY (INHALATION) at 08:58

## 2020-01-01 RX ADMIN — ALBUMIN (HUMAN) 25 G: 0.25 INJECTION, SOLUTION INTRAVENOUS at 15:04

## 2020-01-01 RX ADMIN — HEPARIN SODIUM 16 UNITS/KG/HR: 10000 INJECTION, SOLUTION INTRAVENOUS at 17:32

## 2020-01-01 RX ADMIN — CALCIUM CHLORIDE 1 G: 100 INJECTION, SOLUTION INTRAVENOUS; INTRAVENTRICULAR at 06:17

## 2020-01-01 RX ADMIN — MEROPENEM 1 G: 1 INJECTION, POWDER, FOR SOLUTION INTRAVENOUS at 16:27

## 2020-01-01 RX ADMIN — FUROSEMIDE 40 MG: 10 INJECTION, SOLUTION INTRAMUSCULAR; INTRAVENOUS at 18:28

## 2020-01-01 RX ADMIN — Medication: at 22:00

## 2020-01-01 RX ADMIN — PROPOFOL 1000 MG: 10 INJECTION, EMULSION INTRAVENOUS at 16:30

## 2020-01-01 RX ADMIN — VASOPRESSIN 0.04 UNITS/MIN: 20 INJECTION INTRAVENOUS at 20:35

## 2020-01-01 RX ADMIN — PERFLUTREN 1.65 MG: 6.52 INJECTION, SUSPENSION INTRAVENOUS at 16:20

## 2020-01-01 RX ADMIN — Medication 1 G: at 03:16

## 2020-01-01 RX ADMIN — MEROPENEM 1 G: 1 INJECTION, POWDER, FOR SOLUTION INTRAVENOUS at 15:21

## 2020-01-01 RX ADMIN — IPRATROPIUM BROMIDE AND ALBUTEROL SULFATE 1 AMPULE: .5; 3 SOLUTION RESPIRATORY (INHALATION) at 20:37

## 2020-01-01 RX ADMIN — MEROPENEM 1 G: 1 INJECTION, POWDER, FOR SOLUTION INTRAVENOUS at 15:41

## 2020-01-01 RX ADMIN — DEXTROSE MONOHYDRATE 25 G: 25 INJECTION, SOLUTION INTRAVENOUS at 22:00

## 2020-01-01 RX ADMIN — FUROSEMIDE 40 MG: 10 INJECTION, SOLUTION INTRAMUSCULAR; INTRAVENOUS at 12:02

## 2020-01-01 RX ADMIN — SODIUM CHLORIDE 1000 ML: 9 INJECTION, SOLUTION INTRAVENOUS at 16:31

## 2020-01-01 RX ADMIN — EPINEPHRINE 1 MG: 0.1 INJECTION, SOLUTION ENDOTRACHEAL; INTRACARDIAC; INTRAVENOUS at 03:34

## 2020-01-01 RX ADMIN — INSULIN HUMAN 10 UNITS: 100 INJECTION, SOLUTION PARENTERAL at 17:30

## 2020-01-01 RX ADMIN — SODIUM BICARBONATE 50 MEQ: 84 INJECTION, SOLUTION INTRAVENOUS at 03:24

## 2020-01-01 RX ADMIN — HEPARIN SODIUM 16 UNITS/KG/HR: 10000 INJECTION, SOLUTION INTRAVENOUS at 07:05

## 2020-01-01 RX ADMIN — MORPHINE SULFATE 1 MG: 2 INJECTION, SOLUTION INTRAMUSCULAR; INTRAVENOUS at 14:44

## 2020-01-01 RX ADMIN — SODIUM BICARBONATE 50 MEQ: 84 INJECTION, SOLUTION INTRAVENOUS at 03:37

## 2020-01-01 RX ADMIN — GUAIFENESIN AND DEXTROMETHORPHAN 5 ML: 100; 10 SYRUP ORAL at 10:35

## 2020-01-01 RX ADMIN — Medication 10 ML: at 08:25

## 2020-01-01 RX ADMIN — EPINEPHRINE 1 MG: 0.1 INJECTION, SOLUTION ENDOTRACHEAL; INTRACARDIAC; INTRAVENOUS at 03:31

## 2020-01-01 RX ADMIN — HEPARIN SODIUM 16 UNITS/KG/HR: 10000 INJECTION, SOLUTION INTRAVENOUS at 16:59

## 2020-01-01 RX ADMIN — ALBUMIN (HUMAN) 25 G: 0.25 INJECTION, SOLUTION INTRAVENOUS at 08:20

## 2020-01-01 RX ADMIN — GUAIFENESIN AND DEXTROMETHORPHAN 5 ML: 100; 10 SYRUP ORAL at 23:49

## 2020-01-01 RX ADMIN — ALBUTEROL SULFATE 2 PUFF: 90 AEROSOL, METERED RESPIRATORY (INHALATION) at 12:30

## 2020-01-01 RX ADMIN — MEROPENEM 1 G: 1 INJECTION, POWDER, FOR SOLUTION INTRAVENOUS at 09:28

## 2020-01-01 RX ADMIN — ALBUTEROL SULFATE 2 PUFF: 90 AEROSOL, METERED RESPIRATORY (INHALATION) at 21:45

## 2020-01-01 RX ADMIN — ALBUTEROL SULFATE 2 PUFF: 90 AEROSOL, METERED RESPIRATORY (INHALATION) at 10:03

## 2020-01-01 RX ADMIN — PHENYLEPHRINE HYDROCHLORIDE 300 MCG/MIN: 10 INJECTION, SOLUTION INTRAMUSCULAR; INTRAVENOUS; SUBCUTANEOUS at 01:33

## 2020-01-01 RX ADMIN — GUAIFENESIN AND DEXTROMETHORPHAN 5 ML: 100; 10 SYRUP ORAL at 12:41

## 2020-01-01 RX ADMIN — HEPARIN SODIUM 17 UNITS/KG/HR: 10000 INJECTION, SOLUTION INTRAVENOUS at 00:54

## 2020-01-01 RX ADMIN — VANCOMYCIN HYDROCHLORIDE 1250 MG: 10 INJECTION, POWDER, LYOPHILIZED, FOR SOLUTION INTRAVENOUS at 12:25

## 2020-01-01 RX ADMIN — Medication 10 ML: at 09:30

## 2020-01-01 RX ADMIN — FUROSEMIDE 40 MG: 10 INJECTION, SOLUTION INTRAMUSCULAR; INTRAVENOUS at 11:37

## 2020-01-01 RX ADMIN — ALBUMIN (HUMAN) 25 G: 0.25 INJECTION, SOLUTION INTRAVENOUS at 02:50

## 2020-01-01 RX ADMIN — DEXTROSE MONOHYDRATE 25 G: 25 INJECTION, SOLUTION INTRAVENOUS at 17:23

## 2020-01-01 RX ADMIN — SODIUM BICARBONATE 50 MEQ: 84 INJECTION, SOLUTION INTRAVENOUS at 03:30

## 2020-01-01 RX ADMIN — Medication: at 21:59

## 2020-01-01 RX ADMIN — EPINEPHRINE 1 MG: 0.1 INJECTION, SOLUTION ENDOTRACHEAL; INTRACARDIAC; INTRAVENOUS at 03:27

## 2020-01-01 RX ADMIN — FUROSEMIDE 40 MG: 10 INJECTION, SOLUTION INTRAMUSCULAR; INTRAVENOUS at 18:13

## 2020-01-01 RX ADMIN — MEROPENEM 1 G: 1 INJECTION, POWDER, FOR SOLUTION INTRAVENOUS at 08:22

## 2020-01-01 RX ADMIN — HEPARIN SODIUM 16 UNITS/KG/HR: 10000 INJECTION, SOLUTION INTRAVENOUS at 04:11

## 2020-01-01 RX ADMIN — EPINEPHRINE 1 MG: 0.1 INJECTION, SOLUTION ENDOTRACHEAL; INTRACARDIAC; INTRAVENOUS at 03:22

## 2020-01-01 RX ADMIN — NOREPINEPHRINE BITARTRATE 16 MG: 1 INJECTION, SOLUTION, CONCENTRATE INTRAVENOUS at 16:28

## 2020-01-01 RX ADMIN — GUAIFENESIN AND DEXTROMETHORPHAN 5 ML: 100; 10 SYRUP ORAL at 09:34

## 2020-01-01 RX ADMIN — MEROPENEM 1 G: 1 INJECTION, POWDER, FOR SOLUTION INTRAVENOUS at 17:41

## 2020-01-01 RX ADMIN — Medication 10 ML: at 22:45

## 2020-01-01 RX ADMIN — Medication 10 ML: at 08:22

## 2020-01-01 RX ADMIN — MEROPENEM 1 G: 1 INJECTION, POWDER, FOR SOLUTION INTRAVENOUS at 08:20

## 2020-01-01 RX ADMIN — ALBUMIN (HUMAN) 25 G: 0.25 INJECTION, SOLUTION INTRAVENOUS at 00:27

## 2020-01-01 RX ADMIN — FUROSEMIDE 20 MG: 20 INJECTION, SOLUTION INTRAMUSCULAR; INTRAVENOUS at 10:35

## 2020-01-01 RX ADMIN — ALBUTEROL SULFATE 2 PUFF: 90 AEROSOL, METERED RESPIRATORY (INHALATION) at 13:11

## 2020-01-01 RX ADMIN — IPRATROPIUM BROMIDE AND ALBUTEROL SULFATE 1 AMPULE: .5; 3 SOLUTION RESPIRATORY (INHALATION) at 12:14

## 2020-01-01 RX ADMIN — Medication 10 ML: at 21:55

## 2020-01-01 RX ADMIN — ALBUMIN (HUMAN) 25 G: 0.25 INJECTION, SOLUTION INTRAVENOUS at 08:02

## 2020-01-01 RX ADMIN — MEROPENEM 1 G: 1 INJECTION, POWDER, FOR SOLUTION INTRAVENOUS at 00:06

## 2020-01-01 RX ADMIN — Medication 10 ML: at 21:27

## 2020-01-01 RX ADMIN — GUAIFENESIN AND DEXTROMETHORPHAN 5 ML: 100; 10 SYRUP ORAL at 09:53

## 2020-01-01 RX ADMIN — Medication 6 MG: at 00:55

## 2020-01-01 RX ADMIN — DEXTROSE MONOHYDRATE 250 ML: 50 INJECTION, SOLUTION INTRAVENOUS at 16:26

## 2020-01-01 RX ADMIN — SODIUM CHLORIDE 1000 ML: 9 INJECTION, SOLUTION INTRAVENOUS at 16:32

## 2020-01-01 RX ADMIN — MEROPENEM 1 G: 1 INJECTION, POWDER, FOR SOLUTION INTRAVENOUS at 23:46

## 2020-01-01 RX ADMIN — PHENYLEPHRINE HYDROCHLORIDE 300 MCG/MIN: 10 INJECTION, SOLUTION INTRAMUSCULAR; INTRAVENOUS; SUBCUTANEOUS at 19:31

## 2020-01-01 RX ADMIN — CALCIUM GLUCONATE 1 G: 98 INJECTION, SOLUTION INTRAVENOUS at 22:03

## 2020-01-01 RX ADMIN — MEROPENEM 1 G: 1 INJECTION, POWDER, FOR SOLUTION INTRAVENOUS at 02:01

## 2020-01-01 RX ADMIN — VANCOMYCIN HYDROCHLORIDE 1250 MG: 10 INJECTION, POWDER, LYOPHILIZED, FOR SOLUTION INTRAVENOUS at 01:50

## 2020-01-01 RX ADMIN — EPINEPHRINE 1 MG: 0.1 INJECTION, SOLUTION ENDOTRACHEAL; INTRACARDIAC; INTRAVENOUS at 03:37

## 2020-01-01 RX ADMIN — CALCIUM GLUCONATE 1 G: 94 INJECTION, SOLUTION INTRAVENOUS at 17:30

## 2020-01-01 RX ADMIN — MEROPENEM 1 G: 1 INJECTION, POWDER, FOR SOLUTION INTRAVENOUS at 16:00

## 2020-01-01 RX ADMIN — VANCOMYCIN HYDROCHLORIDE 1000 MG: 10 INJECTION, POWDER, LYOPHILIZED, FOR SOLUTION INTRAVENOUS at 11:28

## 2020-01-01 RX ADMIN — VANCOMYCIN HYDROCHLORIDE 1250 MG: 10 INJECTION, POWDER, LYOPHILIZED, FOR SOLUTION INTRAVENOUS at 01:42

## 2020-01-01 RX ADMIN — EPINEPHRINE 30 MCG/MIN: 1 INJECTION INTRAMUSCULAR; INTRAVENOUS; SUBCUTANEOUS at 02:12

## 2020-01-01 RX ADMIN — HEPARIN SODIUM 18 UNITS/KG/HR: 10000 INJECTION, SOLUTION INTRAVENOUS at 06:21

## 2020-01-01 RX ADMIN — NOREPINEPHRINE BITARTRATE 30 MCG/MIN: 1 INJECTION, SOLUTION, CONCENTRATE INTRAVENOUS at 18:45

## 2020-01-01 RX ADMIN — ALBUTEROL SULFATE 2 PUFF: 90 AEROSOL, METERED RESPIRATORY (INHALATION) at 14:57

## 2020-01-01 RX ADMIN — ALBUTEROL SULFATE 2 PUFF: 90 AEROSOL, METERED RESPIRATORY (INHALATION) at 22:46

## 2020-01-01 RX ADMIN — IPRATROPIUM BROMIDE AND ALBUTEROL SULFATE 1 AMPULE: .5; 3 SOLUTION RESPIRATORY (INHALATION) at 16:28

## 2020-01-01 RX ADMIN — PREDNISONE 40 MG: 20 TABLET ORAL at 09:29

## 2020-01-01 RX ADMIN — DEXTROSE MONOHYDRATE 1 MCG/KG/MIN: 50 INJECTION, SOLUTION INTRAVENOUS at 12:41

## 2020-01-01 RX ADMIN — SODIUM BICARBONATE 50 MEQ: 84 INJECTION, SOLUTION INTRAVENOUS at 20:27

## 2020-01-01 RX ADMIN — HEPARIN SODIUM 16 UNITS/KG/HR: 10000 INJECTION, SOLUTION INTRAVENOUS at 18:07

## 2020-01-01 RX ADMIN — Medication 10 ML: at 08:01

## 2020-01-01 RX ADMIN — CALCIUM CHLORIDE 1 G: 100 INJECTION, SOLUTION INTRAVENOUS; INTRAVENTRICULAR at 03:35

## 2020-01-01 RX ADMIN — GUAIFENESIN AND DEXTROMETHORPHAN 5 ML: 100; 10 SYRUP ORAL at 22:48

## 2020-01-01 RX ADMIN — EPINEPHRINE 1 MG: 0.1 INJECTION, SOLUTION ENDOTRACHEAL; INTRACARDIAC; INTRAVENOUS at 03:40

## 2020-01-01 RX ADMIN — Medication 10 ML: at 08:20

## 2020-01-01 RX ADMIN — ALBUTEROL SULFATE 2 PUFF: 90 AEROSOL, METERED RESPIRATORY (INHALATION) at 07:50

## 2020-01-01 RX ADMIN — ALBUTEROL SULFATE 2 PUFF: 90 AEROSOL, METERED RESPIRATORY (INHALATION) at 16:32

## 2020-01-01 RX ADMIN — MEROPENEM 1 G: 1 INJECTION, POWDER, FOR SOLUTION INTRAVENOUS at 08:05

## 2020-01-01 RX ADMIN — Medication 10 ML: at 23:50

## 2020-01-01 RX ADMIN — SODIUM CHLORIDE 0.2 MCG/KG/HR: 9 INJECTION, SOLUTION INTRAVENOUS at 01:57

## 2020-01-01 RX ADMIN — Medication 10 ML: at 20:53

## 2020-01-01 RX ADMIN — EPINEPHRINE 2 MCG/MIN: 1 INJECTION INTRAMUSCULAR; INTRAVENOUS; SUBCUTANEOUS at 21:56

## 2020-01-01 RX ADMIN — FUROSEMIDE 40 MG: 10 INJECTION, SOLUTION INTRAMUSCULAR; INTRAVENOUS at 09:10

## 2020-01-01 RX ADMIN — HEPARIN SODIUM 13 UNITS/KG/HR: 10000 INJECTION, SOLUTION INTRAVENOUS at 08:38

## 2020-01-01 RX ADMIN — Medication 10 ML: at 08:05

## 2020-01-01 RX ADMIN — ALBUTEROL SULFATE 2 PUFF: 90 AEROSOL, METERED RESPIRATORY (INHALATION) at 21:18

## 2020-01-01 RX ADMIN — Medication 10 ML: at 20:48

## 2020-01-01 RX ADMIN — IOPAMIDOL 80 ML: 755 INJECTION, SOLUTION INTRAVENOUS at 17:54

## 2020-01-01 RX ADMIN — PHENYLEPHRINE HYDROCHLORIDE 300 MCG/MIN: 10 INJECTION, SOLUTION INTRAMUSCULAR; INTRAVENOUS; SUBCUTANEOUS at 22:26

## 2020-01-01 RX ADMIN — FUROSEMIDE 40 MG: 10 INJECTION, SOLUTION INTRAMUSCULAR; INTRAVENOUS at 13:47

## 2020-01-01 RX ADMIN — ALBUTEROL SULFATE 2 PUFF: 90 AEROSOL, METERED RESPIRATORY (INHALATION) at 12:42

## 2020-01-01 RX ADMIN — ALBUMIN (HUMAN) 25 G: 0.25 INJECTION, SOLUTION INTRAVENOUS at 14:21

## 2020-01-01 RX ADMIN — SODIUM CHLORIDE: 9 INJECTION, SOLUTION INTRAVENOUS at 11:17

## 2020-01-01 RX ADMIN — HEPARIN SODIUM 4260 UNITS: 1000 INJECTION INTRAVENOUS; SUBCUTANEOUS at 02:16

## 2020-01-01 RX ADMIN — ALBUTEROL SULFATE 2 PUFF: 90 AEROSOL, METERED RESPIRATORY (INHALATION) at 16:44

## 2020-01-01 RX ADMIN — PROPOFOL 20 MCG/KG/MIN: 10 INJECTION, EMULSION INTRAVENOUS at 20:57

## 2020-01-01 RX ADMIN — ALBUTEROL SULFATE 2 PUFF: 90 AEROSOL, METERED RESPIRATORY (INHALATION) at 11:10

## 2020-01-01 RX ADMIN — SODIUM BICARBONATE: 84 INJECTION, SOLUTION INTRAVENOUS at 21:11

## 2020-01-01 RX ADMIN — MEROPENEM 1 G: 1 INJECTION, POWDER, FOR SOLUTION INTRAVENOUS at 23:51

## 2020-01-01 RX ADMIN — SODIUM BICARBONATE: 84 INJECTION, SOLUTION INTRAVENOUS at 19:41

## 2020-01-01 RX ADMIN — MEROPENEM 1 G: 1 INJECTION, POWDER, FOR SOLUTION INTRAVENOUS at 00:45

## 2020-01-01 RX ADMIN — MEROPENEM 1 G: 1 INJECTION, POWDER, FOR SOLUTION INTRAVENOUS at 08:25

## 2020-01-01 RX ADMIN — HEPARIN SODIUM 16 UNITS/KG/HR: 10000 INJECTION, SOLUTION INTRAVENOUS at 10:35

## 2020-01-01 RX ADMIN — EPINEPHRINE 1 MG: 0.1 INJECTION, SOLUTION ENDOTRACHEAL; INTRACARDIAC; INTRAVENOUS at 03:25

## 2020-01-01 RX ADMIN — PHENYLEPHRINE HYDROCHLORIDE 300 MCG/MIN: 10 INJECTION, SOLUTION INTRAMUSCULAR; INTRAVENOUS; SUBCUTANEOUS at 16:21

## 2020-01-01 RX ADMIN — MEROPENEM 1 G: 1 INJECTION, POWDER, FOR SOLUTION INTRAVENOUS at 16:06

## 2020-01-01 ASSESSMENT — PAIN SCALES - GENERAL
PAINLEVEL_OUTOF10: 0

## 2020-01-01 ASSESSMENT — PULMONARY FUNCTION TESTS
PIF_VALUE: 17
PIF_VALUE: 14
PIF_VALUE: 14
PIF_VALUE: 15
PIF_VALUE: 16
PIF_VALUE: 19
PIF_VALUE: 20
PIF_VALUE: 12
PIF_VALUE: 23

## 2020-01-01 ASSESSMENT — ENCOUNTER SYMPTOMS
GASTROINTESTINAL NEGATIVE: 1
GASTROINTESTINAL NEGATIVE: 1
COUGH: 1
RHINORRHEA: 0
TROUBLE SWALLOWING: 0
CONSTIPATION: 0
CONSTIPATION: 0
VOMITING: 0
WHEEZING: 1
EYE REDNESS: 0
SHORTNESS OF BREATH: 0
ABDOMINAL PAIN: 0
ABDOMINAL PAIN: 0
ALLERGIC/IMMUNOLOGIC NEGATIVE: 1
COUGH: 1
DIARRHEA: 0
WHEEZING: 0
SHORTNESS OF BREATH: 1
DIARRHEA: 0
SHORTNESS OF BREATH: 1
ABDOMINAL PAIN: 1
WHEEZING: 0
NAUSEA: 0
ABDOMINAL DISTENTION: 1
DIARRHEA: 0
NAUSEA: 0
NAUSEA: 0
EYE DISCHARGE: 0
SHORTNESS OF BREATH: 1
EYES NEGATIVE: 1
EYES NEGATIVE: 1
BACK PAIN: 0
CHEST TIGHTNESS: 1
ABDOMINAL DISTENTION: 1
COUGH: 0
CHOKING: 0
SHORTNESS OF BREATH: 1
SORE THROAT: 0
ABDOMINAL DISTENTION: 1

## 2020-01-01 ASSESSMENT — PATIENT HEALTH QUESTIONNAIRE - PHQ9
SUM OF ALL RESPONSES TO PHQ9 QUESTIONS 1 & 2: 0
1. LITTLE INTEREST OR PLEASURE IN DOING THINGS: 0
2. FEELING DOWN, DEPRESSED OR HOPELESS: 0
SUM OF ALL RESPONSES TO PHQ QUESTIONS 1-9: 0
SUM OF ALL RESPONSES TO PHQ QUESTIONS 1-9: 0

## 2020-07-28 NOTE — PROGRESS NOTES
Sayda Hess received a viral test for COVID-19. They were educated on isolation and quarantine as appropriate. For any symptoms, they were directed to seek care from their PCP, given contact information to establish with a doctor, directed to an urgent care or the emergency room.

## 2020-08-07 NOTE — CARE COORDINATION
Ambulatory Care Coordination Note  8/7/2020  CM Risk Score: 0  Charlson 10 Year Mortality Risk Score: 4%     ACC: Yoel Dye, RN    Summary Note: ACM completed chart review, patient remains inpatient at [address] [facility] will ask Health  to follow for discharge and report to covering ACM if discharged during primary ACM's PTO.

## 2020-08-11 NOTE — CARE COORDINATION
acetaminophen (TYLENOL) 325 mg tablet   Take 650 mg by mouth every 6 (six) hours as needed for Mild Pain (1-3).   0     Active   apixaban (ELIQUIS DVT/PE STARTER PACK) 5 mg TABS   Take 2 tabs by mouth TWICE DAILY x 7 days, and then 1 tab by mouth TWICE DAILY. 74 tablet   0 08/05/2020   Active   oxycodone (ROXICODONE) 5 MG TABS    Indications: Leg DVT (deep venous thromboembolism), acute, bilateral (HCC) Take 1 tablet by mouth every 6 (six) hours as needed for Severe Pain (7-10) for up to 5 days. 20 tablet   0 08/06/2020 18/73/3987 Active   folic acid (FOLVITE) 1 MG TABS   Take 1 tablet by mouth daily. 30 tablet   0 08/08/2020   Active     Ambulatory Care Coordination Note  8/11/2020  CM Risk Score: 0  Charlson 10 Year Mortality Risk Score: 4%     ACC: Carmelina Sibley RN    Summary Note: Spoke with patient at length today. He continues with cough at times. All medications were reviewed with patient. The above medication list is what the patient said he is taking. He is no longer taking Singular, flonase, zyrtec, biaxin, romycin or phenyelp-promethazine cough syrup. . Pt states his pain is around a 3/10 mostly annoying, and the pain is in his leg. He is using ace wraps on his leg with the DVT. Pt is checking his foot for pulses and color and ability to move his toes. Pt is on oxygen at 1litre per NC, uses prn during the day and continuous at night. Pt has a pulse oximeter and said is saturation runs around 92% on RA. Pt has [facility] for therapy only which is to start tomorrow he said. Pt said on his next appt with oncology they will decide about treatment plan, he currently does not have a port. Pt did receive one does of chemo prior to discharge. Pt said he and his wife are both working from home currently. Pt denies any transportation issues or concerns with mediation affordability at this time. Pt said he is eating well, appetite is good, is trying to increase his fluid intake to increase hydration.  Pt has a walker at home but is no longer needing to use it. Education Provided to patient on notifying physician for coldness or no pulse in his foot. Monitor for any  bleeding due to Eliquis. Plan: Provide ongoing support and resources as needed. Will forward note to [facility] for continued support     Ambulatory Care Coordination Assessment    Care Coordination Protocol  Program Enrollment:  Rising Risk  Referral from Primary Care Provider:  No  Week 1 - Initial Assessment     Do you have all of your prescriptions and are they filled?:  Yes  Barriers to medication adherence:  None  Are you able to afford your medications?:  Yes  How often do you have trouble taking your medications the way you have been told to take them?:  I always take them as prescribed. Do you have Home O2 Therapy?:  Yes   Oxygen Regimen:  PRN, At night/Sleep Flow - Enter rate/FIO2:  1   Method of Delivery:  Nasal Cannula   CPAP Use:  None      Ability to seek help/take action for Emergent Urgent situations i.e. fire, crime, inclement weather or health crisis. :  Independent  Ability to ambulate to restroom:  Independent  Ability to manage Medications: Independent  Ability to prepare Food Preparation:  Needs Assistance  Ability to maintain home (clean home, laundry):  Needs Assistance  Ability to drive and/or has transportation:  Independent  Is patient able to live independently?:  Yes     Current Housing:  Private Residence                 How wells does the patient now understand their health and well-being (symptoms, signs or risk factors) and what they need to do to manage their health?:  Reasonable to good understanding and already engages in managing health or is willing to undertake better management   How well do you think your patient can engage in healthcare discussions?  (Barriers include language, deafness, aphasia, alcohol or drug problems, learning difficulties, concentration):  Clear and open communication, no identified barriers   Suggested

## 2020-08-12 NOTE — TELEPHONE ENCOUNTER
Abhishek Myles from [facility] called and gave the following info:    Next week:     Twice a week for 2 weeks. Please call Abhishek Myles after pt's appt on Tuesday for doctor's signature.     [phone]

## 2020-08-18 NOTE — PROGRESS NOTES
2020    TELEHEALTH EVALUATION -- Audio/Visual (During SRPJA-54 public health emergency)    HPI:    Keny Forte (:  1970) has requested an audio/video evaluation for the following concern(s):    Patient is here to establish for primary care  Recently was admitted to the hospital for swollen legs were diagnosed with DVT bilaterally further work-up showed lung mass had a biopsy show adenocarcinoma of the lung, he had a PET scan yesterday is waiting for the result appointment to see Dr. Russ Mcbride with [facility] this Thursday  The procedure then now bronchoscopy with biopsy was told his O2 sat dropped and he might need to have a sleep study wants to have referral he was not sure he find a home sleep study or seeing a sleep specialist in [address] [place] [address] see him medical mutual insurance may be better for him to see 1 of our team  He started to feel okay energy is back for him he is trying to walk he came home last week  Patient had trouble with cough first few months was seen virtual visits was told most likely allergy 1 time he was seen [facility] and was given steroids  Patient start to feel a bit better he start walking, he feels his energy slight improvement. Review of Systems   Constitutional: Negative. HENT: Negative. Eyes: Negative. Respiratory: Positive for cough and shortness of breath. Cardiovascular: Negative. Gastrointestinal: Negative. All other systems reviewed and are negative. Prior to Visit Medications    Medication Sig Taking? Authorizing Provider   apixaban (ELIQUIS) 5 MG TABS tablet Take 1 tablet by mouth 2 times daily Yes Historical Provider, MD   folic acid (FOLVITE) 1 MG tablet Take 1 mg by mouth daily Yes Historical Provider, MD       Social History     Tobacco Use    Smoking status: Never Smoker    Smokeless tobacco: Never Used   Substance Use Topics    Alcohol use:  Yes    Drug use: No        Allergies   Allergen Reactions    Amoxicillin    ,   Past Medical History: Diagnosis Date    Cancer [facility])     lung  cancer 2020    Chicken pox     DVT, bilateral lower limbs (HCC)     Lung cancer (HCC)    , No past surgical history on file.,   Social History     Tobacco Use    Smoking status: Never Smoker    Smokeless tobacco: Never Used   Substance Use Topics    Alcohol use: Yes    Drug use: No   ,   Family History   Problem Relation Age of Onset    High Cholesterol Mother     High Blood Pressure Father     Cancer Father         Prostate   ,   Immunization History   Administered Date(s) Administered    Influenza Virus Vaccine 11/07/2019   ,   Health Maintenance   Topic Date Due    HIV screen  07/30/1985    DTaP/Tdap/Td vaccine (1 - Tdap) 07/30/1989    Shingles Vaccine (1 of 2) 07/30/2020    Colon cancer screen colonoscopy  07/30/2020    Flu vaccine (1) 09/01/2020    Lipid screen  05/24/2024    Hepatitis A vaccine  Aged Out    Hepatitis B vaccine  Aged Out    Hib vaccine  Aged Out    Meningococcal (ACWY) vaccine  Aged Out    Pneumococcal 0-64 years Vaccine  Aged Out       PHYSICAL EXAMINATION:  [ INSTRUCTIONS:  \"[x]\" Indicates a positive item  \"[]\" Indicates a negative item  -- DELETE ALL ITEMS NOT EXAMINED]  Vital Signs: (As obtained by patient/caregiver or practitioner observation)    Blood pressure-  Heart rate-    Respiratory rate-    Temperature-  Pulse oximetry-     Constitutional: [x] Appears well-developed and well-nourished [x] No apparent distress      [] Abnormal-   Mental status  [x] Alert and awake  [x] Oriented to person/place/time []Able to follow commands      Eyes:  EOM    [x]  Normal  [] Abnormal-  Sclera  [x]  Normal  [] Abnormal -         Discharge [x]  None visible  [] Abnormal -    HENT:   [x] Normocephalic, atraumatic.   [] Abnormal   [] Mouth/Throat: Mucous membranes are moist.     External Ears [x] Normal  [] Abnormal-     Neck: [x] No visualized mass     Pulmonary/Chest: [x] Respiratory effort normal.  [] No visualized signs of difficulty at the start of the visit: Yes    Total time spent on this encounter: Not billed by time    Services were provided through a video synchronous discussion virtually to substitute for in-person clinic visit. Patient and provider were located at their individual homes. --Mirtha Amaya MD on 8/18/2020 at 4:58 PM    An electronic signature was used to authenticate this note.

## 2020-08-24 NOTE — PRE-PROCEDURE INSTRUCTIONS
Called patient about procedure. Told to be here at 1030 for procedure at 1200. NPO after midnight, but can take morning medication with sips of water, patient stated they stopped blood thinners. To have a responsible adult be with patient take them home and stay with them afterwards, if they do not get admitted to [address] [place] [address]. And if available bring current list of medications. No other questions or concerns.

## 2020-08-25 NOTE — H&P
Patient:  Luis Bryan   :   1970      Relevant clinical history, particularly as it involves the pending procedure, was reviewed and discussed. The procedure including risks and benefits was discussed at length with the patient (or designated family member) and all questions were answered. Informed consent to proceed with the procedure was given. Vital signs were monitored and documented by the Radiology nurse. Targeted physical examination  Heart : regular rate and rhythm  Lungs : clear, breathing easily  Condition : stable    [facility] nurses notes reviewed and agreed. Past Medical History:        Diagnosis Date    Cancer [facility])     lung  cancer 2020    Chicken pox     DVT, bilateral lower limbs (HCC)     Lung cancer (HCC)        Past Surgical History:     No past surgical history on file. Allergies:  Amoxicillin    Medications:   Home Meds  No current facility-administered medications on file prior to encounter.       Current Outpatient Medications on File Prior to Encounter   Medication Sig Dispense Refill    folic acid (FOLVITE) 1 MG tablet Take 1 mg by mouth daily      apixaban (ELIQUIS) 5 MG TABS tablet Take 1 tablet by mouth 2 times daily         Current Meds  ceFAZolin (ANCEF) 1 g in dextrose 5 % 50 mL IVPB (mini-bag), Once          ASA 2 - Patient with mild systemic disease with no functional limitations    II (soft palate, uvula, fauces visible)    Activity:  2 - Able to move 4 extremities voluntarily on command  Respiration:  2 - Able to breathe deeply and cough freely  Circulation:  2 - BP+/- 20mmHg of normal  Consciousness:  2 - Fully awake  Oxygen Saturation (color):  2 - Able to maintain oxygen saturation >92% on room air    Sedation : Moderate sedation planned

## 2020-09-18 NOTE — PROGRESS NOTES
Attempted to call patient about procedure. No answer. Voicemail left. Told to be here at 1030 for procedure at 1200. NPO after midnight, but can take morning medication with sips of water, office should have told them when and if they should stop any blood thinners. To have a responsible adult be with patient take them home and stay with them afterwards, if they are not admitted to hospital afterwards. And if available bring current list of medications.

## 2020-09-21 NOTE — H&P
Patient:  Tino Tompkins   :   1970      Relevant clinical history, particularly as it involves the pending procedure, was reviewed and discussed. The procedure including risks and benefits was discussed at length with the patient (or designated family member) and all questions were answered. Informed consent to proceed with the procedure was given. Vital signs were monitored and documented by the Radiology nurse. Targeted physical examination  Heart : regular rate and rhythm  Lungs : clear, breathing easily  Condition : stable    [facility] nurses notes reviewed and agreed. Past Medical History:        Diagnosis Date    Cancer [facility])     lung  cancer     Chicken pox     DVT, bilateral lower limbs (HCC)     Lung cancer (HCC)        Past Surgical History:           Procedure Laterality Date    IR PORT PLACEMENT EQUAL OR GREATER THAN 5 YEARS  2020    IR PORT PLACEMENT EQUAL OR GREATER THAN 5 YEARS 2020 [facility]       Allergies:  Amoxicillin    Medications:   Home Meds  Current Outpatient Medications on File Prior to Encounter   Medication Sig Dispense Refill    apixaban (ELIQUIS) 5 MG TABS tablet Take 1 tablet by mouth 2 times daily      folic acid (FOLVITE) 1 MG tablet Take 1 mg by mouth daily       No current facility-administered medications on file prior to encounter.         Current Meds  vancomycin (VANCOCIN) 1,000 mg in dextrose 5 % 250 mL IVPB, Once          ASA 2 - Patient with mild systemic disease with no functional limitations    II (soft palate, uvula, fauces visible)    Activity:  2 - Able to move 4 extremities voluntarily on command  Respiration:  2 - Able to breathe deeply and cough freely  Circulation:  2 - BP+/- 20mmHg of normal  Consciousness:  2 - Fully awake  Oxygen Saturation (color):  2 - Able to maintain oxygen saturation >92% on room air    Sedation : Moderate sedation planned

## 2020-09-28 PROBLEM — I26.99 PULMONARY EMBOLISM, BILATERAL (HCC): Status: ACTIVE | Noted: 2020-01-01

## 2020-09-28 NOTE — ED PROVIDER NOTES
112, Resp: 24, SpO2: 98 %   General: 14-year-old male sitting in bed appears to be significantly dyspneic and especially so with any minimal activity. HEENT:  head is atraumatic, pupils equal round and reactive to light, sclera are clear, oropharynx is nonerythematous  Neck: supple, no lymphadenopathy  Chest: nonlabored respirations, equal chest rise bilaterally, no accessory muscle use, no significant adventitial breath sounds are appreciated  Cardiovascular: Tachycardic rate with a regular rhythm, 2+ radial pulses bilaterally, capillary refill 2 seconds  Abdominal: Soft, nontender, nondistended, positive bowel sounds throughout, no rebound or guarding  Skin: Warm, dry well perfused, no rashes  Musculoskeletal: no obvious deformities, no tenderness to palpation diffusely  Neurologic:  alert and oriented x4, speech is clear and intact without dysarthria, moves all 4 extremities equally    Diagnostic Results     EKG   Sinus tachycardia with a relatively low voltage QRS patient has no evidence of any ST or T wave changes that would be indicative of active ischemia normal axis normal intervals. There is no old EKG to which I can directly compare    RADIOLOGY:  CTA CHEST W CONTRAST   Final Result      1. Acute pulmonary emboli involving the right and left main pulmonary arteries and the lobar and segmental branches supplying the right upper lobe, right middle lobe, right lower lobe, lingula, left upper lobe and left lower lobe. . Right heart strain. Right lower lung mass consistent with neoplasm. Diffuse reticulation and reticular nodular opacities right lung likely relates to lymphangitic spread of tumor. Bronchial thickening likely relates to peribronchial spread of tumor. Groundglass opacities left upper lobe right upper lobe and left lower lobe indeterminate. Findings may relate to atelectasis pneumonitis or inflammatory /infectious process.       Marked lymphadenopathy involving the right hilum ,mediastinum and right supraclavicular region. Large volume of ascites. Findings called as a critical result to the emergency room by Dr. Keven Madsen at the time of dictation 9/28/2020 5:00 PM      XR CHEST PORTABLE   Final Result      Right lower lung intrapulmonary mass most consistent with neoplasm. Mediastinal and hilar lymphadenopathy likely relates to metastasis. Reticular nodular opacities noted diffusely in the right lung indeterminate. Lymphangitic spread of tumor is not excluded.           LABS:   Results for orders placed or performed during the hospital encounter of 09/28/20   CBC Auto Differential   Result Value Ref Range    WBC 24.1 (H) 4.0 - 11.0 K/uL    RBC 5.56 4.20 - 5.90 M/uL    Hemoglobin 15.7 13.5 - 17.5 g/dL    Hematocrit 47.3 40.5 - 52.5 %    MCV 85.2 80.0 - 100.0 fL    MCH 28.2 26.0 - 34.0 pg    MCHC 33.1 31.0 - 36.0 g/dL    RDW 18.1 (H) 12.4 - 15.4 %    Platelets 650 846 - 592 K/uL    MPV 7.7 5.0 - 10.5 fL    Neutrophils % 89.9 %    Lymphocytes % 3.1 %    Monocytes % 6.5 %    Eosinophils % 0.3 %    Basophils % 0.2 %    Neutrophils Absolute 21.7 (H) 1.7 - 7.7 K/uL    Lymphocytes Absolute 0.7 (L) 1.0 - 5.1 K/uL    Monocytes Absolute 1.6 (H) 0.0 - 1.3 K/uL    Eosinophils Absolute 0.1 0.0 - 0.6 K/uL    Basophils Absolute 0.0 0.0 - 0.2 K/uL   Basic Metabolic Panel   Result Value Ref Range    Sodium 122 (L) 136 - 145 mmol/L    Potassium 5.4 (H) 3.5 - 5.1 mmol/L    Chloride 87 (L) 99 - 110 mmol/L    CO2 18 (L) 21 - 32 mmol/L    Anion Gap 17 (H) 3 - 16    Glucose 109 (H) 70 - 99 mg/dL    BUN 30 (H) 7 - 20 mg/dL    CREATININE 0.9 0.9 - 1.3 mg/dL    GFR Non-African American >60 >60    GFR African American >60 >60    Calcium 8.1 (L) 8.3 - 10.6 mg/dL   Blood Gas, Venous   Result Value Ref Range    pH, Joaquín 7.437 7.350 - 7.450    pCO2, Joaquín 31.5 (L) 41.0 - 51.0 mmHg    pO2, Joaquín 34.9 25.0 - 40.0 mmHg    HCO3, Venous 20.9 (L) 24.0 - 28.0 mmol/L    Base Excess, Joaquín -1.7 -2.0 - 3.0 mmol/L    O2 Sat, Joaquín 61 Not established %    Carboxyhemoglobin 1.6 (H) 0.0 - 1.5 %    MetHgb, Joaquín 0.4 0.0 - 1.5 %    TC02 (Calc), Joaquín 22 mmol/L    Hemoglobin, Joaquín, Reduced 37.90 %   Lactic Acid, Plasma   Result Value Ref Range    Lactic Acid 3.0 (H) 0.4 - 2.0 mmol/L   Troponin   Result Value Ref Range    Troponin <0.01 <0.01 ng/mL   Brain Natriuretic Peptide   Result Value Ref Range    Pro-BNP 9,069 (H) 0 - 124 pg/mL   EKG 12 Lead   Result Value Ref Range    Ventricular Rate 110 BPM    Atrial Rate 110 BPM    P-R Interval 116 ms    QRS Duration 86 ms    Q-T Interval 324 ms    QTc Calculation (Bazett) 438 ms    P Axis 49 degrees    R Axis 17 degrees    T Axis 20 degrees    Diagnosis       EKG performed in ER and to be interpreted by ER physician. Confirmed by MD, ER ([id number]),  Albert Laird ([phone]) on 9/28/2020 4:50:12 PM       RECENT VITALS:  BP: 123/89, Temp: 98.6 °F (37 °C), Pulse: 112, Resp: 24, SpO2: 98 %       ED Course     Nursing Notes, Past Medical Hx, Past Surgical Hx, Social Hx, Allergies, and Family Hx were reviewed. The patient was given the following medications:  Orders Placed This Encounter   Medications    iopamidol (ISOVUE-370) 76 % injection 80 mL    DISCONTD: furosemide (LASIX) injection 40 mg       CONSULTS:  IP CONSULT TO ONCOLOGY  IP CONSULT TO CARDIOLOGY  IP CONSULT TO HOSPITALIST    MEDICAL DECISION MAKING / ASSESSMENT / Belle Yuriy is a 48 y.o. male presenting to the emergency department with a complaint of difficulty breathing especially with exertion increased lower extremity swelling and increased abdominal girth. Patient with a known history of metastatic lung cancer as well as previous DVT therefore significant concern for multifactorial causes of his shortness of breath to include the possibility of infection, viral versus bacterial pneumonia, acute decompensated heart failure, pneumonitis is a result of his oncologic medications, or pulmonary embolism.   The patient's EKG did not show any evidence of ischemic findings. Chest x-ray was consistent with known metastatic lung cancer otherwise no new focal airspace disease. Remainder the patient's laboratory investigations resulted back showing basic metabolic profile with a sodium of 122 potassium of 5.4 with some hemolysis BUN of 30 creatinine of 0.9. CBC with a white count of 24.1. The patient's CT pulmonary angiogram showed evidence of multiple bilateral pulmonary emboli with evidence of right heart strain increased right ventricular diameter compared to prior studies. There is also some flattening of the RV septum. The patient's BNP was greater than 9000 troponin was normal.  Patient also with was found to have some mild ascites on his CT which was all concerning for right heart failure in the setting of these new bilateral pulmonary emboli. I spoke with the interventional radiologist on-call Dr. Marge Khan who will be coming and visit evaluating the patient for possible EKOS. I will be speaking with the hospitalist as well as the ICU residents for consideration for ICU placement. Critical Care:  Due to the immediate potential for life-threatening deterioration due to acute respiratory failure with hypoxia as well as acute pulmonary emboli with right heart failure, I spent 45 minutes providing critical care. This time excludes time spent performing procedures but includes time spent on direct patient care, history retrieval, review of the chart, and discussions with patient, family, and consultant(s). Clinical Impression     1. Acute saddle pulmonary embolism with acute cor pulmonale (HCC)    2. Acute respiratory failure with hypoxia (HCC)        Disposition     PATIENT REFERRED TO:  No follow-up provider specified.     DISCHARGE MEDICATIONS:  New Prescriptions    No medications on file       DISPOSITION Decision To Admit 09/28/2020 05:26:57 PM         Ranjit Hernandez MD  09/28/20 2346

## 2020-09-28 NOTE — CONSULTS
[facility]  Consult Note      Requesting Physician: Dai Villalobos MD    CHIEF COMPLAINT:  Shortness of breath    HISTORY OF PRESENT ILLNESS:  Mr. Pavan Mendez  is a 48 y.o. male we are seeing in consultation for recent finding of lung adenocarcinoma. Patient was at [facility] for unscheduled sick visit. He was experiencing worsening shortness of breath for the past few days not associated with fever or productive cough. Given patient recent DVT diagnosis and worsening SOB, he was transported by squad to [facility] ER. Patient was found to be tachycardiac, requiring 2L O2 NC. Labs revealed hyponatremia Na 122, hyperkalemia K 5.4, lactic acid 3.0, ProBNP 9000, leukocytosis 24.1. He underwent CT chest W contrast which revealed acute PE involving right and left PA with right heart strain. Pt was admitted to ICU and will likely for EKOS awaiting IR recs. Of note, patient presented to [facility] for leg pain and swelling. He was found to have DVT and was started on heparin which was transitioned to Eliquis. He underwent CTPA which showed a possible small chronic RLL embolus but revealed RLL mass with mediastinal and right hilar lymphadenopathy and Subcentimeter left lung nodules. Patient underwent transbronchial biopsy which revealed adenocarcinoma stage ARNALDO [id number]. Pt had CT abd/pelvis and MRI brain which was negative for metastasis. He received one cycle of Carbo/alimta while inpatient at [facility] in Aug 2020. Biopsy result found to be EGFR-, ALK-, BRAF-, ROS1- but MET, RET still pending. He began nivolumab/ipilimumab on August 31. Cycle 1 day 29 is due October 2. He had his port removed 8/25 due to infection and culture grew Pseudomonas and plan was to place a new port a couple of weeks after he finished antibiotics. Patient seen and examined at bedside at the ED. He states he was off Eliquis for a week. He started having SOB for the past few days which has been getting worse.  He normally uses oxygen at home for sleep apnea but started requiring oxygen during the day. At the time of evaluation, he reports his breathing is better. He also endorses abd distention which started the same time. He denies chest pain or productive cough. Denies fever, night sweats, chills, nausea, vomiting, diarrhea. Past Medical History:        Diagnosis Date    Cancer [facility])     lung  cancer 2020    Chicken pox     DVT, bilateral lower limbs (HCC)     Lung cancer (HCC)        Past Surgical History:        Procedure Laterality Date    IR PORT PLACEMENT EQUAL OR GREATER THAN 5 YEARS  8/25/2020    IR PORT PLACEMENT EQUAL OR GREATER THAN 5 YEARS 8/25/2020 [facility] PROC[facility]       Current Medications:  No current facility-administered medications for this encounter. Allergies:  Amoxicillin    Social History:      Social History     Socioeconomic History    Marital status:      Spouse name: Not on file    Number of children: Not on file    Years of education: Not on file    Highest education level: Not on file   Occupational History    Not on file   Social Needs    Financial resource strain: Not on file    Food insecurity     Worry: Not on file     Inability: Not on file    Transportation needs     Medical: Not on file     Non-medical: Not on file   Tobacco Use    Smoking status: Never Smoker    Smokeless tobacco: Never Used   Substance and Sexual Activity    Alcohol use:  Yes    Drug use: No    Sexual activity: Not on file   Lifestyle    Physical activity     Days per week: Not on file     Minutes per session: Not on file    Stress: Not on file   Relationships    Social connections     Talks on phone: Not on file     Gets together: Not on file     Attends Latter-day service: Not on file     Active member of club or organization: Not on file     Attends meetings of clubs or organizations: Not on file     Relationship status: Not on file    Intimate partner violence     Fear of current or ex partner: Not on file     Emotionally abused: Not on file     Physically abused: Not on file     Forced sexual activity: Not on file   Other Topics Concern    Not on file   Social History Narrative    Not on file       Family History:         Problem Relation Age of Onset    High Cholesterol Mother     High Blood Pressure Father     Cancer Father         Prostate       REVIEW OF SYSTEMS:    Review of Systems   See above    PHYSICAL EXAM:      Vitals:  /89   Pulse 112   Temp 98.6 °F (37 °C) (Oral)   Resp 24   SpO2 98%     No intake or output data in the 24 hours ending 09/28/20 1708    Physical Exam  Constitutional:       General: He is in acute distress. Appearance: He is obese. HENT:      Head: Normocephalic and atraumatic. Neck:      Musculoskeletal: Normal range of motion and neck supple. Cardiovascular:      Rate and Rhythm: Regular rhythm. Tachycardia present. Pulses: Normal pulses. Heart sounds: Normal heart sounds. No murmur. Pulmonary:      Breath sounds: No rales. Comments: tachypnea  Abdominal:      General: Bowel sounds are normal. There is distension. Palpations: Abdomen is soft. Tenderness: There is no abdominal tenderness. Musculoskeletal: Normal range of motion. General: No tenderness. Right lower leg: Edema (2+) present. Left lower leg: Edema (2+) present. Skin:     General: Skin is warm and dry. Capillary Refill: Capillary refill takes less than 2 seconds. Neurological:      Mental Status: He is alert.            DATA:  Recent Labs     09/28/20  1536 09/21/20  1045   WBC 24.1* 15.2*   NEUTROABS 21.7*  --    LYMPHOPCT 3.1  --    RBC 5.56 5.40   HGB 15.7 15.5   HCT 47.3 45.6   MCV 85.2 84.4   MCH 28.2 28.7   MCHC 33.1 34.0   RDW 18.1* 17.3*    199       Lab Results   Component Value Date     (L) 09/28/2020    K 5.4 (H) 09/28/2020    CL 87 (L) 09/28/2020    CO2 18 (L) 09/28/2020    GLUCOSE 109 (H) 09/28/2020    BUN 30 (H) patient size, and use of iterative reconstruction technique. Multiplanar reconstructed images and MIP images for CT angiogram. CONTRAST: 80 mL Isovue-370 FINDINGS: LUNGS AND AIRWAYS: Bronchial wall thickening involving the lower lobe bronchi. Marked reticulation diffusely within the right lung within the right upper lobe, right middle lobe and right lower lobe which may relate to lymphangitic spread of tumor. Marked groundglass opacity left lower lobe and left upper lobe less pronounced right upper lobe may relate to atelectasis or pneumonitis or acute atypical inflammatory infectious etiology. Enhancing mass within the right lower lobe measures 5 x 5 cm consistent with patient's primary neoplasm present previously. PLEURA: Tiny right pleural effusion measures 10 mm in thickness HEART / GREAT VESSELS: The thoracic aorta is normal. No aneurysm. No dissection. Acute pulmonary embolism involving the right and left main pulmonary arteries and the lobar branches of the pulmonary arteries supplying the right upper lobe, right middle lobe, right lower lobe, left upper lobe, lingula and left lower lobe as well as the segmental vessels supplying the lungs diffusely. There is evidence for right heart strain. . Small pericardial effusion. ADENOPATHY/MEDIASTINUM: Right supraclavicular, right paratracheal, precarinal, subcarinal, right hilar and AP window lymphadenopathy present previously. CHEST WALL / LOWER NECK: Right supraclavicular lymphadenopathy present previously UPPER ABDOMEN: Limited evaluation due to timing of imaging. Moderate volume of ascites noted. BONES: No acute abnormality. . Atrophy involving the musculature of the shoulder girdle on the right. 1. Acute pulmonary emboli involving the right and left main pulmonary arteries and the lobar and segmental branches supplying the right upper lobe, right middle lobe, right lower lobe, lingula, left upper lobe and left lower lobe. . Right heart strain.  Right lower lung mass consistent with neoplasm. Diffuse reticulation and reticular nodular opacities right lung likely relates to lymphangitic spread of tumor. Bronchial thickening likely relates to peribronchial spread of tumor. Groundglass opacities left upper lobe right upper lobe and left lower lobe indeterminate. Findings may relate to atelectasis pneumonitis or inflammatory /infectious process. Marked lymphadenopathy involving the right hilum ,mediastinum and right supraclavicular region. Large volume of ascites. Findings called as a critical result to the emergency room by Dr. Ramona Powell at the time of dictation 9/28/2020 5:00 PM        Problem List  Problem List Items Addressed This Visit     None            IMPRESSION/RECOMMENDATIONS:  Adenocarcinoma stage ARNALDO [id number]  Started on CTPA 9/28 right lower lung mass consistent with neoplasm + diffuse reticular opacities likely lymphangitis spread of tumor, ground glass opacitieis which could be atelectasis pneumonitis or inflammatory/infectior process+ lymphadenopathy and large volume of ascites  - started on nivolumab/ipilimumab  - no evidence of metastais to abd/brain but there is evidence of bone metastasis on PET   - will follow pt inpatient, pt to receive nivolumab/ipilimumab outpatient     Acute pulmonary embolism  Recent hx of DVT on Eliquis but had a gap in refilling med. CTPA revealed  right and left main pulmonary arteries and the lobar and segmental branches supplying the right upper lobe, right middle lobe, right lower lobe, lingula, left upper lobe and left lower lobe. Evidence of right heart strain. - pt going for IR procedure EKOS      Discussed with attending Dr. Jigna Hansen    Thank you for the consultation. Suman Lopez MD  PG-Y2  9/28/2020  5:08 PM    Attending:    Patient seen and examined in the ICU. Data reviewed. Case discussed with Dr. Liliana Grossman on rounds. Agree with his consult note with my modification.     Noe No MD

## 2020-09-28 NOTE — PROCEDURES
BILATERAL PULMONARY ARTERY THROMBOLYSIS CATHETER PLACEMENT    Indication:  Bilateral acute PE    :  TEZ Delacruz    EBL: <10 mL    Under sterile conditions US guided access performed into RIJV x 2.  6 Fr sheaths placed and then 6 Fr EKOS catheters inserted serially into left and right pulmonary arteries. Full report of procedure in Radiology PACS reporting system.

## 2020-09-28 NOTE — H&P
ICU HISTORY AND PHYSICAL       Hospital Day:   ICU Day:                                                          Code:No Order  Admit Date: 9/28/2020  PCP: Susana Campuzano MD                                  CC: Dyspnea    HISTORY OF PRESENT ILLNESS:   Evie Libman is a 48 y.o. male w/PMHx of Stage ARNALDO Adenocarcinoma of the R Lung (cT2, cN2, cM1a) on carboplatin/pemetrexed c/b DVT and chronic PE on Eliquis, HTN, and HLD. He presents with new onset dyspnea. Mr. Mary Van had LL pain/swelling, and RL pain when presenting to [facility] 8/20/2020. He has had cough for several months. Unintiential weight loss over several months as well. Reported occasional smoker. Bilateral DVT on Doppler occluding several vessels. Suboptimal CT for PE, but also found a lung mass in RLL with mediastinal and R hilar YAMILET and sub-centimeter L nodules, with possible R subsegmental PE. He underwent biopsy which found adenocarcinoma. He started treatment with Dr. Lety Grant on 8/2020 with carboplatin and pemetrexed. Additionally on 9/21 his catheter was removed by IR and it grew pan-sensitive pseudomonas. Mr. Mary Van reports about 5 days of dyspnea, worse with getting up. Activity is severely limited. Of note he had run out of his Eliquis for about one week. He uses oxygen with CPAP at night, but over these last few days. Non-productive cough for months. No fevers/chills. No N/V/Abdominal Pain. Feels distended. PAST HISTORY:     Past Medical History:   Diagnosis Date    [facility]     lung  [facility]    Chicken pox     DVT, bilateral lower limbs (HCC)     Lung cancer (HCC)        Past Surgical History:   Procedure Laterality Date    IR PORT PLACEMENT EQUAL OR GREATER THAN 5 YEARS  8/25/2020    IR PORT PLACEMENT EQUAL OR GREATER THAN 5 YEARS 8/25/2020 [facility] PROCED[facility]       SocialHistory:   Social History     Tobacco Use    Smoking status: Never Smoker    Smokeless tobacco: Never Used   Substance Use Topics    Alcohol use:  Yes    Drug use: No       Family History:  Family History   Problem Relation Age of Onset    High Cholesterol Mother     High Blood Pressure Father     Cancer Father         Prostate       MEDICATIONS:     No current facility-administered medications on file prior to encounter. Current Outpatient Medications on File Prior to Encounter   Medication Sig Dispense Refill    apixaban (ELIQUIS) 5 MG TABS tablet Take 1 tablet by mouth 2 times daily      folic acid (FOLVITE) 1 MG tablet Take 1 mg by mouth daily       Scheduled Meds:   Current Facility-Administered Medications   Medication Dose Route Frequency Provider Last Rate Last Dose    alteplase (CATHFLO) 10 mg in sodium chloride 0.9 % 250 mL infusion  1 mg/hr Intracatheter Continuous Pricilla Yuan MD        alteplase (CATHFLO) 10 mg in sodium chloride 0.9 % 250 mL infusion  1 mg/hr Intracatheter Continuous Priiclla Yuan MD         Current Outpatient Medications   Medication Sig Dispense Refill    apixaban (ELIQUIS) 5 MG TABS tablet Take 1 tablet by mouth 2 times daily      folic acid (FOLVITE) 1 MG tablet Take 1 mg by mouth daily       Allergies: Allergies   Allergen Reactions    Amoxicillin        REVIEW OF SYSTEMS:   History obtained from chart review and the patient    Review of Systems as above. PHYSICAL EXAM:   Vitals: /89   Pulse 112   Temp 98.6 °F (37 °C) (Oral)   Resp 24   SpO2 98%     I/O:  No intake or output data in the 24 hours ending 09/28/20 1742  No intake/output data recorded. No intake/output data recorded. Physical Examination:   Physical Exam  Constitutional:       General: He is in acute distress. Appearance: He is obese. HENT:      Head: Normocephalic and atraumatic. Neck:      Musculoskeletal: Normal range of motion and neck supple. Cardiovascular:      Rate and Rhythm: Regular rhythm. Tachycardia present. Pulses: Normal pulses. Heart sounds: Normal heart sounds. No murmur.    Pulmonary:      Breath sounds: No rales. Comments: tachypnea  Abdominal:      General: Bowel sounds are normal. There is distension. Palpations: Abdomen is soft. Tenderness: There is no abdominal tenderness. Musculoskeletal: Normal range of motion. General: No tenderness. Right lower leg: Edema (2+) present. Left lower leg: Edema (2+) present. Skin:     General: Skin is warm and dry. Capillary Refill: Capillary refill takes less than 2 seconds. Neurological:      Mental Status: He is alert. Access:   -Central Access Day #: Has RIJ port                                  -Peripheral Access Day#:1  -Arterial line Day#:NA                                  Cervantes Day#:NA  NGT Day#: NA                                            ETT Day#:NA  Vent Settings:    / / /   ABG: No results for input(s): PHART, ZMZ9HNR, PO2ART in the last 72 hours. DATA:   Labs:  CBC:     Recent Labs     09/28/20  1536   WBC 24.1*   HGB 15.7   HCT 47.3      MCV 85.2     lymphopenia    BMP:   Recent Labs     09/28/20  1536   *   K 5.4*   CL 87*   CO2 18*   BUN 30*   CREATININE 0.9   GLUCOSE 109*     LFT's: No results for input(s): AST, ALT, ALB, BILITOT, ALKPHOS in the last 72 hours. Troponin:   Recent Labs     09/28/20  1536   TROPONINI <0.01     BNP:No results for input(s): BNP in the last 72 hours. ABGs: No results for input(s): PHART, SIO7GEK, PO2ART in the last 72 hours. INR: No results for input(s): INR in the last 72 hours. U/A:No results for input(s): NITRITE, COLORU, PHUR, LABCAST, WBCUA, RBCUA, MUCUS, TRICHOMONAS, YEAST, BACTERIA, CLARITYU, SPECGRAV, LEUKOCYTESUR, UROBILINOGEN, BILIRUBINUR, BLOODU, GLUCOSEU, AMORPHOUS in the last 72 hours. Invalid input(s): KETONESU    CTA CHEST W CONTRAST   Final Result      1.  Acute pulmonary emboli involving the right and left main pulmonary arteries and the lobar and segmental branches supplying the right upper lobe, right middle lobe, right lower lobe, lingula, left upper lobe and left lower lobe. . Right heart strain. Right lower lung mass consistent with neoplasm. Diffuse reticulation and reticular nodular opacities right lung likely relates to lymphangitic spread of tumor. Bronchial thickening likely relates to peribronchial spread of tumor. Groundglass opacities left upper lobe right upper lobe and left lower lobe indeterminate. Findings may relate to atelectasis pneumonitis or inflammatory /infectious process. Marked lymphadenopathy involving the right hilum ,mediastinum and right supraclavicular region. Large volume of ascites. Findings called as a critical result to the emergency room by Dr. Susan Pinto at the time of dictation 9/28/2020 5:00 PM      XR CHEST PORTABLE   Final Result      Right lower lung intrapulmonary mass most consistent with neoplasm. Mediastinal and hilar lymphadenopathy likely relates to metastasis. Reticular nodular opacities noted diffusely in the right lung indeterminate. Lymphangitic spread of tumor is not excluded. EKG: (ED) Sinus tachycardia with a relatively low voltage QRS patient has no evidence of any ST or T wave changes that would be indicative of active ischemia normal axis normal intervals. There is no old EKG to which I can directly compare. Echo: pending     ASSESSMENT AND PLAN:   Jeferson Campo is a 48 y.o. male, who was admitted for multivessel PE w/EKOS treatment. Neuro  N/A    Cardiac  Right Heart Failure 2/2 PE: RH strain noted on CT scan. Patient also has ascites. Negative troponin. Pro-BNP on 9069.  - ECHO pending.  - consider Lasix 40 mg IV BID when BP are stable  - Cardiology consulted; appreciate recs    Pulmonary  Pulmonary Embolism, Multivessel s/p EKOS: CT with PE involving R and L lung with R heart strain and ascites. Tachycardic.  Saturations 84% on RA.  - EKOS treatment by IR   - Follow up post-intervention recommendations including heparin protocol.  - oxygen support  - ECHO to evaluate R heart strain. Possible Pneumonia: Bacterial vs. COVID. CT findings confounded by malignancy. Afebrile. Leukocytosis of 24. Prior catheter tip with pan-sensitive pseudomonas. - vancomycin and merrem  - consider starting remdesivir if suspicion remains post treatment. - COVID PCR  - see labs below. Adenocarcinoma of the Lung, Stage Dalton:  (cT2, cN2, cM1a) on carboplatin/keytruda/pemetrexed. Initially presented due to DVT with concern for PE.  - Heme/Onc consulted, recommendations appreciated. - as below    GI  Ascites 2/2 RH Strain:   - as above    Renal  Hyponatremia: Est. Serum Osm 264. Hypervolemic, likely 2/2 to [facility] failure. Asymptomatic. - plan for Lasix 40 mg IV BID if pressure tolerates  - 2g sodium diet when on diet. \  - urine osm, Na  - q8H Na    Hyperkalemia: EKG without peaked T-waves. - plan for Lasix 40 IV BID if pressure tolerates. Lactic Acidosis: Mild, Lactate of 3.0, AG 17. Likely from PE.  - repeat lactate    Endo  Possible Adrenal Insufficiency: likely malignancy related. Low spot cortisol. Low Na, elevated K+. - ACTH stimulation test    ID  Possible PNA: Bacterial vs. COVID; confounding factors include lung cancer and PE. Leukocytosis of 24.1 with low absolute lymphocytes. Concern for possible pseudomonal infection given the catheter tip culture from one week prior.  - antimicrobials as above. - COVID PCR  - procal  - blood cultures x2  - urine strep pneumo ag  - MRSA probe    Heme/Onc  Adenocarcinoma of the Lung, Stage Dalton:  (cT2, cN2, cM1a) on carboplatin/keytruda/pemetrexed. Initially presented due to DVT with concern for PE. Follows with Dr. Almas Schwab. Started on CTPA 9/28 right lower lung mass consistent with neoplasm + diffuse reticular opacities likely lymphangitis spread of tumor, ground glass opacitieis which could be atelectasis pneumonitis or inflammatory/infectior process+ lymphadenopathy and large volume of ascites.   - Heme/Onc consulted, recommendations appreciated. Code Status:No Order (Full Code at this time)  FEN: No diet orders on file NPO. PPX:  On Heparin for EKOS  DISPO: ICU    This patient has been staffed and discussed with Dr. Yadira Trotter. -----------------------------  Soni Graves MD  PGY-1, Internal Medicine  Contact via [facility]  9/28/2020  0:45 PM     I certify that Nikia Lang is expected to be hospitalized for 2 midnights based on the following assessment and plan:      Patient seen and examined, plan of care discussed with residents. Agree with their assessment and plan with following addendum:  49 y/o male with recently diagnosed metastatic lung cancer, presents with worsening dyspnea and found to have major PE with right heart strain. He was seen by IR and EKOS catheter was placed for catheter directed thrombolysis. Admitting to ICU post procedure. Initiate broad spectrum antibiotic coverage for possible pneumonia. H/o recent port infection with pseudomonas.        Rodolfo Islas

## 2020-09-28 NOTE — ED NOTES
Bed: B19-19  Expected date:   Expected time:   Means of arrival:   Comments:  [address], RN  09/28/20 2496

## 2020-09-29 NOTE — PLAN OF CARE
Problem: Skin Integrity:  Goal: Will show no infection signs and symptoms  Description: Will show no infection signs and symptoms  Outcome: Ongoing     Problem: Falls - Risk of:  Goal: Will remain free from falls  Description: Will remain free from falls  Outcome: Ongoing     Problem: Respiratory:  Goal: Ability to maintain adequate ventilation will improve  Description: Ability to maintain adequate ventilation will improve  Outcome: Ongoing

## 2020-09-29 NOTE — PROGRESS NOTES
Clinical Pharmacy Progress Note  Pharmacy to dose Vancomycin    Admit date: 9/28/2020     Subjective:  Patient is a 53yr old male from home with a PMHx significant for DVT, small chronic RLL embolus, CPAP with O2 at night, and Stage IV lung adenocarcinoma recently diagnosed in August.  Chemo started 8/31 at [facility]. Port infected with Pseudomonas; removed 9/21. Presented to the ED 9/28 with SOB, admitting to missing about 1 week of Eliquis. CTPA reveals R and L lung PE with R heart strain and ascites; s/p emergent EKOS on 9/28 PM.  Given leukocytosis, started broad spectrum abx started empirically for PNA vs COVID. Pharmacy has been consulted by Dr. Nacho Gerber to dose vancomycin. Current antibiotic regimen:   Meropenem 1g IV q8h -- day #2  Vancomycin -- pharmacy to dose -- day #2   1.25g IV q8h (9/28-current)    Anticoagulation Home regimen:   · Apixaban 5mg PO BID (last filled 9/18 for 30 days supply, previous fill on 8/6)      Objective:  Recent Labs     09/29/20  0300   WBC 23.0*   HGB 13.7   HCT 40.8   MCV 84.1          Recent Labs     09/29/20  0300   *   K 4.6   CL 90*   CO2 19*   BUN 34*   CREATININE 1.2     Estimated Creatinine Clearance: 86 mL/min (based on SCr of 1.2 mg/dL). Height = 67 in  Weight = 106.6 kg    Micro:  9/28 Blood x2 = in process  9/28 MRSA nasal PCR = ordered  9/28 COVID-19 = in process    Prophylaxis:    VTE: Heparin gtt (EKOS lines with alteplase stopped/pulled 9/29 AM)   SUP:  Not indicated    Assessment/Plan:    1. Empiric PNA:  Leukocytosis, Stage IV lung adenocarcinoma on chemo. Afebrile. Vancomycin -- pharmacy to dose:  · Currently on 1.25g IV q8h. Dose aggressive based on adjusted body weight. · Scr overnight increased from 0.9 (in ED) to 1.2. Of note, patient did receive IV contrast for CTPA. UOP overnight okay at 0.6 mL/kg/hr. · Out of caution for increased Scr, will decrease dosing interval from q8h to 1.25g IV q12h.   Next dose due today @ 13:00.    · Will obtain a trough level once at steady state, due 9/30 @ 12:30. Goal range = 15-20 mcg/mL for empiric PNA. · MRSA nasal PCR ordered; await collection. If this results negative, will recommend to stop vancomycin. · Will follow renal function closely, and adjust dosing as appropriate. Meropenem:   · Current dose of 1g IV q8h appropriate for indication and masha fxn. · Will monitor. 2.  Anticoagulation for Bilat PE, DVT:  S/p EKOS thrombolysis on 9/28 PM.  EKOS sheaths/lines removed. · Heparin drip ordered, started this AM @  06:21. · Spoke with RN, who was in patient room. Patient states he took a dose of apixaban the AM of hospital admission, as well as the several days before. · Given interaction with Anti-Xa levels with DOAC (apixaban), will implement monitoring of heparin gtt via aPTTs per protocol x 72 hrs. Formal note on this to follow. Discussed with RN. Thank you, please call with questions.    Ranjit Rae PharmD., BCPS   9/29/2020 8:34 AM  Wireless: [phone]

## 2020-09-29 NOTE — PROGRESS NOTES
Floor transfer note    Admit Date: 9/28/2020  Diet: DIET GENERAL; Daily Fluid Restriction: 1200 ml    CC: Dyspnea    Brief history:  Kandice Mendez is a 48 y.o. male  w/PMHx of Stage ARNALDO Adenocarcinoma of the R Lung (cT2, cN2, cM1a) on carboplatin/pemetrexed and nivolumab/ipilimumab c/b DVT and chronic PE on Eliquis, HTN, and HLD. He presents with new onset dyspnea.     Mr. Dunne had LL pain/swelling, and RL pain when presenting to [facility] 8/20/2020. Bilateral DVT on Doppler occluding several vessels. Suboptimal CT for PE, but also found a lung mass in RLL with mediastinal and R hilar AYMILET and sub-centimeter L nodules, with possible R subsegmental PE. He underwent biopsy which found adenocarcinoma. He started treatment with Dr. Almas Schwab on 8/2020 with carboplatin and pemetrexed. During his treatment it was noted that his port appeared clotted. They attempted to push medications through another IV, but he developed fevers and chills at that time. He received 3d of IV antibiotics and removed his port on 9/21. Cultures grew pan-sensitive pseudomonas. He was sent home on a 7d course of antibiotics. He completed the eliquis dose pack and was under the impression that therapy was complete for the DVT. He stopped the medication for 1-2 weeks. He began having dyspnea and worse swelling. Eliquis prescription was reordered but was apparently too little too late as dyspnea progressed until he presented here and found to have acute PE involving right and left pulmonary arteries and other branches. There was evidence of right heart strain. He was started on heparin drip. He remained stable on 8L and was assessed as appropriate for floor transfer from ICU. He was started on empiric broad spectrum antibiotics because of recent line infection treated with less than ideal antibiotics. Imagining inconclusive for pneumonia given his lung mass.      Has large volume ascites likely due to right heart failure, but there is a possibility of portal vein thrombosis given widespread VTE. There is evidence of liver damage. GI was consulted. Lasix may be trialed. ECHO is pending. Medications:     Scheduled Meds:   vancomycin  1,250 mg Intravenous Q12H    albuterol sulfate HFA  2 puff Inhalation 4x daily    meropenem  1 g Intravenous Q8H    sodium chloride flush  10 mL Intravenous 2 times per day     Continuous Infusions:   heparin (PORCINE) Infusion 16 Units/kg/hr (09/29/20 1807)    sodium chloride 75 mL/hr at 09/29/20 1117    sodium chloride       PRN Meds:heparin (porcine), heparin (porcine), acetaminophen **OR** acetaminophen, polyethylene glycol, promethazine **OR** ondansetron, perflutren lipid microspheres, sodium chloride flush, sodium chloride    Objective:   Vitals:   T-max:  Patient Vitals for the past 8 hrs:   BP Temp Temp src Pulse Resp SpO2   09/29/20 1809 -- -- -- -- (!) 31 95 %   09/29/20 1800 110/76 -- -- 103 (!) 36 94 %   09/29/20 1730 125/75 -- -- 105 26 97 %   09/29/20 1700 88/63 -- -- 106 23 96 %   09/29/20 1630 113/67 -- -- 107 27 96 %   09/29/20 1600 105/65 -- -- 105 28 95 %   09/29/20 1530 103/61 -- -- 105 18 97 %   09/29/20 1500 115/70 97.4 °F (36.3 °C) Temporal 106 26 98 %   09/29/20 1430 (!) 111/93 -- -- 103 30 99 %   09/29/20 1400 107/74 -- -- 106 (!) 34 97 %   09/29/20 1330 109/69 -- -- 111 (!) 32 95 %   09/29/20 1300 127/77 -- -- 108 22 94 %   09/29/20 1200 131/77 -- -- 108 25 93 %   09/29/20 1130 124/84 -- -- 110 (!) 33 95 %   09/29/20 1100 125/82 97.2 °F (36.2 °C) Temporal 108 (!) 35 93 %   09/29/20 1030 96/82 -- -- 110 (!) 35 94 %       Intake/Output Summary (Last 24 hours) at 9/29/2020 1828  Last data filed at 9/29/2020 1810  Gross per 24 hour   Intake 2688 ml   Output 475 ml   Net 2213 ml     Review of Systems   Constitutional: Negative for chills and fever. Respiratory: Positive for cough and shortness of breath. Negative for wheezing.     Gastrointestinal: Positive for abdominal distention and abdominal pain. Negative for constipation, diarrhea, nausea and vomiting. Physical Exam  Constitutional:       General: He is not in acute distress. Appearance: He is well-developed. He is obese. He is ill-appearing. He is not diaphoretic. HENT:      Head: Normocephalic and atraumatic. Mouth/Throat:      Pharynx: No oropharyngeal exudate. Eyes:      General:         Right eye: No discharge. Left eye: No discharge. Conjunctiva/sclera: Conjunctivae normal.      Pupils: Pupils are equal, round, and reactive to light. Neck:      Musculoskeletal: Normal range of motion and neck supple. Thyroid: No thyromegaly. Vascular: No JVD. Trachea: No tracheal deviation. Cardiovascular:      Rate and Rhythm: Normal rate and regular rhythm. Heart sounds: No murmur. No friction rub. No gallop. Pulmonary:      Effort: Pulmonary effort is normal. No respiratory distress. Breath sounds: No stridor. No wheezing or rales. Comments: Tachypneic. Abdominal:      General: Bowel sounds are normal. There is distension. Palpations: Abdomen is soft. There is no mass. Tenderness: There is no abdominal tenderness. There is no guarding. Comments: Fluid wave   Musculoskeletal: Normal range of motion. General: No swelling, tenderness or deformity. Lymphadenopathy:      Cervical: No cervical adenopathy. Skin:     General: Skin is warm and dry. Coloration: Skin is not pale. Findings: No erythema or rash. Neurological:      Mental Status: He is alert and oriented to person, place, and time. Cranial Nerves: No cranial nerve deficit. Sensory: No sensory deficit. Motor: No abnormal muscle tone. Coordination: Coordination normal.      Deep Tendon Reflexes: Reflexes normal.   Psychiatric:         Behavior: Behavior normal.         Thought Content:  Thought content normal.           LABS:    CBC:   Recent Labs     09/28/20  1536 09/29/20  0300 WBC 24.1* 23.0*   HGB 15.7 13.7   HCT 47.3 40.8    159   MCV 85.2 84.1     Renal:    Recent Labs     09/28/20  1536 09/29/20  0300   * 124*   K 5.4* 4.6   CL 87* 90*   CO2 18* 19*   BUN 30* 34*   CREATININE 0.9 1.2   GLUCOSE 109* 163*   CALCIUM 8.1* 7.8*   ANIONGAP 17* 15     Hepatic:   Recent Labs     09/29/20  1229   *   *   BILITOT 5.3*   BILIDIR 4.6*   PROT 6.1*   LABALBU 2.6*   ALKPHOS 780*     Troponin:   Recent Labs     09/28/20  1536   TROPONINI <0.01     BNP: No results for input(s): BNP in the last 72 hours. Lipids:   No results for input(s): CHOL, HDL in the last 72 hours. Invalid input(s): LDLCALCU, TRIGLYCERIDE  ABGs:  No results for input(s): PHART, HDA3TOX, PO2ART, CJJ2TVT, BEART, THGBART, J0PSUIZP, YKM4MWP in the last 72 hours. INR:   Recent Labs     09/28/20  1536 09/29/20  1229   INR 1.58* 1.56*     Lactate: No results for input(s): LACTATE in the last 72 hours. Cultures:  -----------------------------------------------------------------  RAD:   IR THROMB VEIN INFUSE INIT TX DAY   Final Result      Successful placement of 6 [place] EKOS catheters into bilateral pulmonary arteries for bilateral pulmonary arterial thrombolysis. CTA CHEST W CONTRAST   Final Result      1. Acute pulmonary emboli involving the right and left main pulmonary arteries and the lobar and segmental branches supplying the right upper lobe, right middle lobe, right lower lobe, lingula, left upper lobe and left lower lobe. . Right heart strain. Right lower lung mass consistent with neoplasm. Diffuse reticulation and reticular nodular opacities right lung likely relates to lymphangitic spread of tumor. Bronchial thickening likely relates to peribronchial spread of tumor. Groundglass opacities left upper lobe right upper lobe and left lower lobe indeterminate. Findings may relate to atelectasis pneumonitis or inflammatory /infectious process.       Marked lymphadenopathy involving the right hilum ,mediastinum and right supraclavicular region. Large volume of ascites. Findings called as a critical result to the emergency room by Dr. Wander Lopez at the time of dictation 9/28/2020 5:00 PM      XR CHEST PORTABLE   Final Result      Right lower lung intrapulmonary mass most consistent with neoplasm. Mediastinal and hilar lymphadenopathy likely relates to metastasis. Reticular nodular opacities noted diffusely in the right lung indeterminate. Lymphangitic spread of tumor is not excluded.       US LIVER    (Results Pending)         Assessment/Plan:     Per ICU note    Clemente Moritz, PGY-2  09/29/20  6:28 PM

## 2020-09-29 NOTE — PLAN OF CARE
Problem: Falls - Risk of:  Goal: Absence of physical injury  Description: Absence of physical injury  Outcome: Ongoing  Note: Patient remains on fall precautions r/t post eckos , fall precautions remain in place no s/s of injury noted     Problem: Skin Integrity:  Goal: Absence of new skin breakdown  Description: Absence of new skin breakdown  Note: Skin intact, sacral heart on coccyx, skin inspected q 2 hourly no evidence of breakdown, continue to monitor for changes

## 2020-09-29 NOTE — CONSULTS
ICU Consult Note       Hospital Day:   ICU Day:                                                          Code:Full Code  Admit Date: 9/28/2020  PCP: Karla Adame MD                                  CC: Dyspnea    HISTORY OF PRESENT ILLNESS:   Nik Schreiber is a 48 y.o. male  w/PMHx of Stage ARNALDO Adenocarcinoma of the R Lung (cT2, cN2, cM1a) on carboplatin/pemetrexed and nivolumab/ipilimumab c/b DVT and chronic PE on Eliquis, HTN, and HLD. He presents with new onset dyspnea.     Mr. Dunne had LL pain/swelling, and RL pain when presenting to [facility] 8/20/2020. He has had cough for several months. Unintiential weight loss over several months as well. Reported occasional smoker. Bilateral DVT on Doppler occluding several vessels. Suboptimal CT for PE, but also found a lung mass in RLL with mediastinal and R hilar YAMILET and sub-centimeter L nodules, with possible R subsegmental PE. He underwent biopsy which found adenocarcinoma. He started treatment with Dr. Rosemary Mazariegos on 8/2020 with carboplatin and pemetrexed. During his treatment it was noted that his port appeared clotted. They attempted to push medications through another IV, but he developed fevers and chills at that time. He received 3d of IV antibiotics (rocephin?) and removed his port on 9/21. Cultures grew pan-sensitive pseudomonas. He was sent home on a 7d course of antibiotics (unclear what).     Mr. Dunne reports about 5 days of dyspnea, worse with getting up. Activity is severely limited. Of note he had run out of his Eliquis for about one week because he didn't realize that he was supposed to refill the medication. He uses oxygen with CPAP at night, but over these last few days needed the oxygen during the day. Non-productive cough for months. No fevers/chills. No N/V/Abdominal Pain. Feels distended. Interval Hx  Mr. Hernandez Johnson feels better this morning post EKOS. Saturations normal on 8L NC. He has started on heparin gtt. SOB improving. Still distended.     PAST HISTORY: Past Medical History:   Diagnosis Date    Cancer [facility])     lung  cancer 2020    Chicken pox     DVT, bilateral lower limbs (HCC)     Lung cancer (HCC)        Past Surgical History:   Procedure Laterality Date    IR PORT PLACEMENT EQUAL OR GREATER THAN 5 YEARS  8/25/2020    IR PORT PLACEMENT EQUAL OR GREATER THAN 5 YEARS 8/25/2020 [facility] PROCED[facility]       SocialHistory:   Social History     Tobacco Use    Smoking status: Never Smoker    Smokeless tobacco: Never Used   Substance Use Topics    Alcohol use: Yes    Drug use: No     Tobacco: patient reports being an occasional smoker. Family History:  Family History   Problem Relation Age of Onset    High Cholesterol Mother     High Blood Pressure Father     Cancer Father         Prostate       MEDICATIONS:     No current facility-administered medications on file prior to encounter. Current Outpatient Medications on File Prior to Encounter   Medication Sig Dispense Refill    apixaban (ELIQUIS) 5 MG TABS tablet Take 1 tablet by mouth 2 times daily      folic acid (FOLVITE) 1 MG tablet Take 1 mg by mouth daily         Scheduled Meds:   meropenem  1 g Intravenous Q8H    sodium chloride flush  10 mL Intravenous 2 times per day    vancomycin  1,250 mg Intravenous Q8H      Continuous Infusions:   heparin (PORCINE) Infusion 18 Units/kg/hr (09/29/20 0621)    alteplase (ACTIVASE) 10mg in 0.9% sodium chloride infusion (EKOS catheter) Stopped (09/29/20 0115)    alteplase (ACTIVASE) 10mg in 0.9% sodium chloride infusion (EKOS catheter) Stopped (09/29/20 0115)    sodium chloride       PRN Meds:heparin (porcine), heparin (porcine), acetaminophen **OR** acetaminophen, polyethylene glycol, promethazine **OR** ondansetron, perflutren lipid microspheres, sodium chloride flush, sodium chloride    Allergies: Allergies   Allergen Reactions    Amoxicillin        REVIEW OF SYSTEMS:   History obtained from chart review and the patient.     Review of Systems   Constitutional: Negative for chills, diaphoresis and fever. Respiratory: Positive for shortness of breath. Negative for wheezing. Cardiovascular: Positive for leg swelling. Negative for chest pain. Gastrointestinal: Positive for abdominal distention. Negative for abdominal pain. Genitourinary: Positive for decreased urine volume. Neurological: Negative for dizziness, tremors, weakness, light-headedness and headaches. PHYSICAL EXAM:   Vitals: BP (!) 123/100   Pulse 100   Temp 97.1 °F (36.2 °C) (Temporal)   Resp 21   Ht 5' 7\" (1.702 m)   Wt 235 lb (106.6 kg)   SpO2 96%   BMI 36.81 kg/m²     I/O:      Intake/Output Summary (Last 24 hours) at 9/29/2020 0636  Last data filed at 9/29/2020 0600  Gross per 24 hour   Intake 1168 ml   Output 475 ml   Net 693 ml     I/O this shift:  In: 1168 [I.V.:1168]  Out: 475 [Urine:475]  No intake/output data recorded. Physical Examination:     Physical Exam  Constitutional:       General: He is not in acute distress. Appearance: He is obese. HENT:      Head: Normocephalic and atraumatic. Cardiovascular:      Rate and Rhythm: Regular rhythm. Tachycardia present. Heart sounds: No murmur. No friction rub. Pulmonary:      Breath sounds: No stridor. No wheezing, rhonchi or rales. Comments: Reduced breath sounds at the bases. Egophony at bases. No appreciable crackles. Abdominal:      General: Bowel sounds are normal. There is distension. Palpations: Abdomen is soft. Tenderness: There is no abdominal tenderness. Neurological:      Mental Status: He is alert and oriented to person, place, and time. Motor: No weakness. Comments: Can move all 4 extremities. ABG: No results for input(s): PHART, SGW9TOE, PO2ART in the last 72 hours.       DATA:   Labs:  CBC:   Recent Labs     09/28/20  1536 09/29/20  0300   WBC 24.1* 23.0*   HGB 15.7 13.7   HCT 47.3 40.8    159       BMP:   Recent Labs     09/28/20  1536 09/29/20  0300   * 124*   K 5.4* 4.6   CL 87* 90*   CO2 18* 19*   BUN 30* 34*   CREATININE 0.9 1.2   GLUCOSE 109* 163*     LFT's: No results for input(s): AST, ALT, ALB, BILITOT, ALKPHOS in the last 72 hours. Troponin:   Recent Labs     09/28/20  1536   TROPONINI <0.01     BNP:No results for input(s): BNP in the last 72 hours. ABGs: No results for input(s): PHART, AHQ4EYO, PO2ART in the last 72 hours. INR:   Recent Labs     09/28/20  1536   INR 1.58*     Venous BG (9/28): 7.437/31.5/34.9/20.9     U/A:No results for input(s): NITRITE, COLORU, PHUR, LABCAST, WBCUA, RBCUA, MUCUS, TRICHOMONAS, YEAST, BACTERIA, CLARITYU, SPECGRAV, LEUKOCYTESUR, UROBILINOGEN, BILIRUBINUR, BLOODU, GLUCOSEU, AMORPHOUS in the last 72 hours. Invalid input(s): KETONESU    IR [address] INFUSE INIT TX DAY   Final Result      Successful placement of 6 [place] EKOS catheters into bilateral pulmonary arteries for bilateral pulmonary arterial thrombolysis. CTA CHEST W CONTRAST   Final Result      1. Acute pulmonary emboli involving the right and left main pulmonary arteries and the lobar and segmental branches supplying the right upper lobe, right middle lobe, right lower lobe, lingula, left upper lobe and left lower lobe. . Right heart strain. Right lower lung mass consistent with neoplasm. Diffuse reticulation and reticular nodular opacities right lung likely relates to lymphangitic spread of tumor. Bronchial thickening likely relates to peribronchial spread of tumor. Groundglass opacities left upper lobe right upper lobe and left lower lobe indeterminate. Findings may relate to atelectasis pneumonitis or inflammatory /infectious process. Marked lymphadenopathy involving the right hilum ,mediastinum and right supraclavicular region. Large volume of ascites.       Findings called as a critical result to the emergency room by Dr. Keven Madsen at the time of dictation 9/28/2020 5:00 PM      XR CHEST 804. Urine Na <20. May have SIADH vs. Adrenal Insufficiency in addition. - plan for Lasix 40 mg IV BID if pressure tolerates  - 2g sodium diet when on diet. - q8H Na     Hyperkalemia, resolving: EKG without peaked T-waves. - normalized on repeat labs    Lactic Acidosis: Mild, Lactate of 3.0, AG 17. Likely from PE.  - repeat lactate. Acute Kidney Injury: mild elevation in Cr to 1.2 from 0.9. Likely due to intravascular depletion exacerbated by IV contrast. Will monitor; may need to diurese if driven by congestion and worsens.  - NS at 75cc/hr    Endo  Possible Adrenal Insufficiency: likely malignancy related. Low spot cortisol. Low Na, elevated K+. - ACTH stimulation test     ID  Possible PNA: Bacterial vs. COVID; confounding factors include lung cancer and PE. Leukocytosis of 24.1 with low absolute lymphocytes. Concern for possible pseudomonal infection given the catheter tip culture from one week prior.  - antimicrobials as above. - COVID PCR  - blood cultures x2  - MRSA probe     Heme/Onc  Adenocarcinoma of the Lung, Stage [place]:  (cT2, cN2, cM1a) on carboplatin/keytruda/pemetrexed initially. Initially presented due to DVT with concern for PE. Follows with Dr. Analisa Pedro. Now receiving nivolumab/ipilimumab. - Heme/Onc consulted, recommendations appreciated.     Code Status:Full Code  FEN: Diet NPO, After Midnight   PPX:  Heparin   DISPO: Transfer to PCU    This patient has been staffed and discussed with Dr. Baylee Tellez. -----------------------------  Henry Young MD  PGY-1, Internal Medicine  Contact via [facility]  9/29/2020  6:36 AM     Pulm/CC    Patient seen and examined. I agree with Dr. Kasi Wright history, physical, lab findings, assessment and plan. Assessment  1. Submassive PE with right heart strain s/p EKOS  2. Noncompliance with Eliquis  3. Acute Hypoxemic Resp Failure  4. Stage IV NSCLC - suspect lymphangitic spread. On nivolumab/pilimumab  5. Ascites  6. Hyponatremia  7.  Hyperbilirubinemia with

## 2020-09-29 NOTE — PROGRESS NOTES
Patient admitted to ICU 4526 from cath lab with EKOS running. Patient alert and oriented, on 8L NC. All belongings placed in closet. CHG and 4 eyes assessment performed. Patient placed in [facility] r/o, labs drawn and sent and patient updated on plan of care. Will continue to monitor closely.

## 2020-09-29 NOTE — PROGRESS NOTES
1400- resp     Patient remains on 8 liters nasal canula oxygen with % sat maintaining 92- 94 % , patient continues to be Short of breath with any exertion , continue to monitor resp status

## 2020-09-29 NOTE — PROGRESS NOTES
4 Eyes Admission Assessment     I agree as the admission nurse that 2 RN's have performed a thorough Head to Toe Skin Assessment on the patient. ALL assessment sites listed below have been assessed on admission. Areas assessed by both nurses:  [x]   Head, Face, and Ears   [x]   Shoulders, Back, and Chest  [x]   Arms, Elbows, and Hands   [x]   Coccyx, Sacrum, and Ischium  [x]   Legs, Feet, and Heels        Does the Patient have Skin Breakdown?   No         Robel Prevention initiated:  Yes   Wound Care Orders initiated:  No      [facility] nurse consulted for Pressure Injury (Stage 3,4, Unstageable, DTI, NWPT, and Complex wounds) or Robel score 18 or lower:  No      Nurse 1 eSignature: Electronically signed by Connie Arroyo RN on 9/29/20 at 6:32 AM EDT    **SHARE this note so that the co-signing nurse is able to place an eSignature**    Nurse 2 eSignature: Electronically signed by Rebecca Garrison RN on 9/29/20 at 6:33 AM EDT

## 2020-09-29 NOTE — CONSULTS
[address] [facility]  GI Consultation      Patient: Shana Rodarte  : 1970       Date:  2020    Subjective:       History of Present Illness  Patient is a 48 y.o.  male with a PMHx of Stage IV adenocarcinoma of R lung (dx 2020) on carboplatin/pemextred and nivolumab/ipilmumab, DVT +chronic PE on eliquis, and diastolic HF who was admitted with bilateral PE who is seen in consult for new-onset ascites and elevated LFTs. He presented with 5 days of new-onset dyspnea. He 'ran out' of eliquis 1 wk ago and hasn't been taking it. He started using his CPAP during the day as well over the last 5 days, which he previously only used at night. He feels that his abdomen is distended. He had EKOS, after which he states feeling somewhat better. He is currently on heparin gtt and saturating appropriately on 8L NC. He has no prior GI hx. He has no reported melena or hematochezia, denies N/V, F/C. He denies abdominal pain. He has never had EGD or colonoscopy. Notable labs- , , Direct bili 4.6, alb 2.6, apTT 84, lactic 3.4, WBC 24.1. BNP >9000  CTA showed mult bialt Pes and increased right heart strain as compared to previous                                               Past Medical History:   Diagnosis Date    Cancer [facility])     lung  cancer     Chicken pox     DVT, bilateral lower limbs (HCC)     Lung cancer ([facility] [address]       Past Surgical History:   Procedure Laterality Date    IR PORT PLACEMENT EQUAL OR GREATER THAN 5 YEARS  2020    IR PORT PLACEMENT EQUAL OR GREATER THAN 5 YEARS 2020 [facility] PROC[facility]      Past Endoscopic History - none    Admission Meds  No current facility-administered medications on file prior to encounter.       Current Outpatient Medications on File Prior to Encounter   Medication Sig Dispense Refill    apixaban (ELIQUIS) 5 MG TABS tablet Take 1 tablet by mouth 2 times daily      folic acid (FOLVITE) 1 MG tablet Take 1 mg by mouth daily Allergies  Allergies   Allergen Reactions    Amoxicillin       Social   Social History     Tobacco Use    Smoking status: Never Smoker    Smokeless tobacco: Never Used   Substance Use Topics    Alcohol use: Yes        Family History   Problem Relation Age of Onset    High Cholesterol Mother     High Blood Pressure Father     Cancer Father         Prostate         Review of Systems  Pertinent items are noted in HPI. Physical Exam    /67   Pulse 107   Temp 97.4 °F (36.3 °C) (Temporal)   Resp 27   Ht 5' 7\" (1.702 m)   Wt 235 lb (106.6 kg)   SpO2 96%   BMI 36.81 kg/m²   General appearance: alert, cooperative, no distress, appears stated age  Anicteric, No Jaundice  Head: Normocephalic, without obvious abnormality  Lungs: increased resp effort, decreased lung sounds bilat bases  Heart: tachycardic, regular rhythm, normal S1 and S2, no murmurs or rubs  Abdomen: abnormal findings:  distended,shifting dullness. Nontender. Extremities: atraumatic, bilat LE edema  Skin: warm and dry  Neuro: intact  AAOX3      Data Review:    Recent Labs     09/28/20  1536 09/29/20  0300   WBC 24.1* 23.0*   HGB 15.7 13.7   HCT 47.3 40.8   MCV 85.2 84.1    159     Recent Labs     09/28/20  1536 09/29/20  0300   * 124*   K 5.4* 4.6   CL 87* 90*   CO2 18* 19*   BUN 30* 34*   CREATININE 0.9 1.2     Recent Labs     09/29/20  1229   *   *   BILIDIR 4.6*   BILITOT 5.3*   ALKPHOS 780*     Recent Labs     09/28/20  1536 09/29/20  1229   PROTIME 18.4* 18.2*   INR 1.58* 1.56*     Chest CT:   1. Acute pulmonary emboli involving the right and left main pulmonary arteries and the lobar and segmental branches supplying the right upper lobe, right middle lobe, right lower lobe, lingula, left upper lobe and left lower lobe.    . Right heart strain.         Right lower lung mass consistent with neoplasm.         Diffuse reticulation and reticular nodular opacities right lung likely relates to lymphangitic spread of tumor.         Bronchial thickening likely relates to peribronchial spread of tumor.         Groundglass opacities left upper lobe right upper lobe and left lower lobe indeterminate. Findings may relate to atelectasis pneumonitis or inflammatory /infectious process.         Marked lymphadenopathy involving the right hilum ,mediastinum and right supraclavicular region.         Large volume of ascites. Assessment:     Active Problems:    Pulmonary embolism, bilateral (HCC)  Resolved Problems:    * No resolved hospital problems. *    1) New-onset ascites- cirrhosis vs portal vein thrombosis vs right HF vs malignancy  2) Transaminitis with hyperbilirubinemia- diastolic HF exacerbation vs Budd-Chiari syndrome  3) Elevated ALP - Biliary obstruction vs PBC    Recommendations:     - Will defer to ICU team whether appropriate to consider holding heparin 4-6 hrs prior in order to perform diagnostic paracentesis (protein + albumin/ascites gradient)  - Abdominal U/S with doppler of portal hepatic veins (likely will be deferred until negative COVID test)  - Consider diuresis  - Serial liver chem  - Hepatitis panel, antinuclear Ab, anti-mitochondrial Ab pending    Thank you for the opportunity to participate in Randy Shaw Uzair's care. This patient was discussed with Art Salinas MD  ----------------  Remy Quezada MD  PGY-1  9/29/20    Discussed with patient resident reviewed chart agree with note. Suspect more of an issue with right heart failure/strain but liver labs are more elevated than usual for only CHF so consider underlying liver disease, med related liver toxicity etc.Ideally should have therapeutic and diagnostic paracentesis but not sure if he can come off anticoagulation to do that. Portal vein thrombosis considered and US with Dopplers ordered.  Would consider starting lasix and aldactone if no other contraindication

## 2020-09-29 NOTE — PROGRESS NOTES
Hospitalist Progress Note      PCP: Samara Burdick MD    Date of Admission: 2020    Chief Complaint on Admission: shortness of breath    Pt Seen/Examined and Chart Reviewed. Admitting dx PE with cor pulmonale    SUBJECTIVE/OBJECTIVE:     Patient reports ongoing dyspnea. He is on 8 liters O2. Was on 2 liters last night. No bleeding. EKOS system is taken out, on heparin drip. Allergies  Amoxicillin    Medications      Scheduled Meds:   vancomycin  1,250 mg Intravenous Q12H    meropenem  1 g Intravenous Q8H    sodium chloride flush  10 mL Intravenous 2 times per day       Infusions:   heparin (PORCINE) Infusion 16 Units/kg/hr (20 1317)    sodium chloride 75 mL/hr at 20 1117    sodium chloride         PRN Meds:  heparin (porcine), heparin (porcine), acetaminophen **OR** acetaminophen, polyethylene glycol, promethazine **OR** ondansetron, perflutren lipid microspheres, sodium chloride flush, sodium chloride    Vitals    TEMPERATURE:  Current - Temp: 97.4 °F (36.3 °C); Max - Temp  Av °F (36.1 °C)  Min: 96.7 °F (35.9 °C)  Max: 97.4 °F (36.3 °C)  RESPIRATIONS RANGE: Resp  Av.9  Min: 18  Max: 39  PULSE RANGE: Pulse  Av.3  Min: 100  Max: 118  BLOOD PRESSURE RANGE:  Systolic (55XLG), IEX:312 , Min:96 , VFS:415   ; Diastolic (37BCT), UFB:32, Min:68, Max:124    PULSE OXIMETRY RANGE: SpO2  Av.4 %  Min: 89 %  Max: 99 %  24HR INTAKE/OUTPUT:      Intake/Output Summary (Last 24 hours) at 2020 1523  Last data filed at 2020 1503  Gross per 24 hour   Intake 2341 ml   Output 475 ml   Net 1866 ml       Exam:      General appearance: moderate resp distress, appears stated age and cooperative.   Lungs: rhonchi with end exp wheezing  Heart: tachy, regular rate and rhythm with Normal S1/S2 without  murmurs, rubs or gallops, point of maximum impulse non-displaced  Abdomen: Soft, non-tender , moderately distended without rigidity or guarding and positive bowel sounds all four quadrants. Extremities: No clubbing, cyanosis, 1-2 + pitting leg edema bilaterally. Skin: Skin color, texture, turgor normal.    Neurologic: Alert and oriented X 3,  grossly non-focal.  Mental status: Alert, oriented, thought content appropriate. Data    Recent Labs     09/28/20  1536 09/29/20  0300   WBC 24.1* 23.0*   HGB 15.7 13.7   HCT 47.3 40.8    159      Recent Labs     09/28/20  1536 09/29/20  0300   * 124*   K 5.4* 4.6   CL 87* 90*   CO2 18* 19*   BUN 30* 34*   CREATININE 0.9 1.2     Recent Labs     09/29/20  1229   *   *   BILIDIR 4.6*   BILITOT 5.3*   ALKPHOS 780*     Recent Labs     09/28/20  1536 09/29/20  1229   INR 1.58* 1.56*     Recent Labs     09/28/20  1536   TROPONINI <0.01       Consults:     IP CONSULT TO ONCOLOGY  IP CONSULT TO HOSPITALIST  IP CONSULT TO CRITICAL CARE  IP CONSULT TO CRITICAL CARE  PHARMACY TO DOSE VANCOMYCIN    Active Hospital Problems    Diagnosis Date Noted    Pulmonary embolism, bilateral ([facility] [address] [I26.99] 09/28/2020         ASSESSMENT AND PLAN      Pulmonary embolus with core pulmonale:  S/p EKOS overnight, no transitioned to heparin drip  Cont close monitoring  Was on eliquis at home    Acute on chronic hypoxic resp failure:  multifactorial- PE, lung cancer, possible pneumonia    Possible pneumonia:  Blood cx, resp culture, MRSA probe, COVID 19 are pending  Cont with coverage with vanco and meropenem    New onset ascites:  Could be result of heart failure. ECHO and liver US are pending. Given persistent elevation of lactic acid- will check liver function tests. Lung adenocarcinoma stage IV:  S/p one cycle of carbo/alimta in Aug. Now on novolumab/ipillimumab  CT is concerning for lymphangitic spread of cancer- pulmonology is reviewing his films. Recent pseudomonas infection of the port:  Cont with meropenem    Hyponatremia:  Combination of hypervolemia from ascites, SIADH, poor oral intake. Agree with fluid restirction and saline. DVT Prophylaxis: heparin drip  Diet: DIET GENERAL; Daily Fluid Restriction: 1200 ml  Code Status: Full Code    PT/OT Angela Méndez MD

## 2020-09-29 NOTE — PROGRESS NOTES
Asked  about ACTH stimulation lab order and need for the required medication for the test which was not ordered. Dr. Lopez Smiley said he would look into it and get back with me. No new orders at this time. Will continue to monitor.

## 2020-09-29 NOTE — PROGRESS NOTES
EKOS lines and sheath pulled by , pressure held for 15 minutes and dressing applied. Dressing remains C/D/I. Patient tolerated procedure well. Will continue to monitor closely.

## 2020-09-29 NOTE — CONSULTS
Clinical Pharmacy Consult Note    Admit date: 9/28/2020    Subjective/Objective:  47 yo with PMHx that includes hyperlipidemia who is admitted with bilateral PE and possible pneumonia  Pharmacy is consulted to dose Vancomycin per Dr. Carl Sunshine    Pertinent Medications:  Vancomycin 1250 mg q8h -- day # 1    Recent Labs     09/28/20  1536   *   K 5.4*   CL 87*   CO2 18*   BUN 30*   CREATININE 0.9       Estimated Creatinine Clearance: 114 mL/min (based on SCr of 0.9 mg/dL). Recent Labs     09/28/20  1536   WBC 24.1*   HGB 15.7   HCT 47.3   MCV 85.2          Height:  5' 7\" (170.2 cm)  Weight: 235 lb (106.6 kg)    Micro:  Date Site Micro Susceptibility                         Assessment/Plan:  1. Possible Pneumonia (bacterial vs COVID)  :  vancomycin day #1  Vancomycin  · Will start 1250 mg q8h    · Clinical pharmacist will follow-up in AM.  · Renal function will be monitored closely and dosing will be adjusted as appropriate. Please call with any questions. Thank you for consulting pharmacy!   Rose Yuen, PharmD, [facility] 9/28/2020 8:17 PM

## 2020-09-29 NOTE — PROGRESS NOTES
RESPIRATORY THERAPY ASSESSMENT    Name:  [address] [place] [address] Record Number:  [id number]  Age: 48 y.o. Gender: male  : 1970  Today's Date:  2020  Room:  [address] [place] [address]    Assessment     Is the patient being admitted for a COPD or Asthma exacerbation? No   (If yes the patient will be seen every 4 hours for the first 24 hours and then reassessed)    Patient Admission Diagnosis  PE      Allergies  Allergies   Allergen Reactions    Amoxicillin        Minimum Predicted Vital Capacity:     1006          Actual Vital Capacity:      1000              Pulmonary History:Lung CA  Home Oxygen Therapy:  room air  Home Respiratory Therapy:None   Current Respiratory Therapy:  Albuterol  Treatment Type: IS       Respiratory Severity Index(RSI)   Patients with orders for inhalation medications, oxygen, or any therapeutic treatment modality will be placed on Respiratory Protocol. They will be assessed with the first treatment and at least every 72 hours thereafter. The following severity scale will be used to determine frequency of treatment intervention. Smoking History: No Smoking History = 0    Social History  Social History     Tobacco Use    Smoking status: Never Smoker    Smokeless tobacco: Never Used   Substance Use Topics    Alcohol use:  Yes    Drug use: No       Recent Surgical History: None = 0  Past Surgical History  Past Surgical History:   Procedure Laterality Date    IR PORT PLACEMENT EQUAL OR GREATER THAN 5 YEARS  2020    IR PORT PLACEMENT EQUAL OR GREATER THAN 5 YEARS 2020 [facility] PROC[facility]       Level of Consciousness: Alert, Oriented, and Cooperative = 0    Level of Activity: Non weight bearing- transfers bed to chair only = 3    Respiratory Pattern: Dyspnea with exertion;Irregular pattern;or RR less than 6 = 2    Breath Sounds: Absent bilaterally and/or with wheezes = 3    Sputum   ,  , Sputum How Obtained: Nasal(nasal swab done for COVID; walked to lab by RN)  Cough: Weak, non-productive = 3    Vital Signs   /75   Pulse 105   Temp 97.4 °F (36.3 °C) (Temporal)   Resp 26   Ht 5' 7\" (1.702 m)   Wt 235 lb (106.6 kg)   SpO2 97%   BMI 36.81 kg/m²   SPO2 (COPD values may differ): 86-87% on room air or greater than 92% on FiO2 35- 50% = 3    Peak Flow (asthma only): not applicable = 0    RSI: 91-51 = Q4WA (every four hours while awake) and Q4hrs PRN        Plan       Goals: medication delivery    Patient/caregiver was educated on the proper method of use for Respiratory Care Devices:  Yes      Level of patient/caregiver understanding able to:   ? Verbalize understanding   ? Demonstrate understanding       ? Teach back        ? Needs reinforcement       ? No available caregiver               ? Other:     Response to education:  Very Good     Is patient being placed on Home Treatment Regimen? No     Does the patient have everything they need prior to discharge? NA     Comments: Patient has a cough and feels tight. He would like to try the Albuterol. Plan of Care: Albuterol MDI Q4h w/a    Electronically signed by Miladys Baptiste RCP on 9/29/2020 at 6:08 PM    Respiratory Protocol Guidelines     1. Assessment and treatment by Respiratory Therapy will be initiated for medication and therapeutic interventions upon initiation of aerosolized medication. 2. Physician will be contacted for respiratory rate (RR) greater than 35 breaths per minute. Therapy will be held for heart rate (HR) greater than 140 beats per minute, pending direction from physician. 3. Bronchodilators will be administered via Metered Dose Inhaler (MDI) with spacer when the following criteria are met:  a. Alert and cooperative     b. HR < 140 bpm  c. RR < 30 bpm                d. Can demonstrate a 2-3 second inspiratory hold  4. Bronchodilators will be administered via Hand Held Nebulizer [facility]) to patients when ANY of the following criteria are met  a. Incognizant or uncooperative          b.  Patients treated with HHN at Home        c. Unable to demonstrate proper use of MDI with spacer     d. RR > 30 bpm   5. Bronchodilators will be delivered via Metered Dose Inhaler (MDI), HHN, Aerogen to intubated patients on mechanical ventilation. 6. Inhalation medication orders will be delivered and/or substituted as outlined below. Aerosolized Medications Ordering and Administration Guidelines:    1. All Medications will be ordered by a physician, and their frequency and/or modality will be adjusted as defined by the patients Respiratory Severity Index (RSI) score. 2. If the patient does not have documented COPD, consider discontinuing anticholinergics when RSI is less than 9.  3. If the bronchospasm worsens (increased RSI), then the bronchodilator frequency can be increased to a maximum of every 4 hours. If greater than every 4 hours is required, the physician will be contacted. 4. If the bronchospasm improves, the frequency of the bronchodilator can be decreased, based on the patient's RSI, but not less than home treatment regimen frequency. 5. Bronchodilator(s) will be discontinued if patient has a RSI less than 9 and has received no scheduled or as needed treatment for 72  Hrs. Patients Ordered on a Mucolytic Agent:    1. Must always be administered with a bronchodilator. 2. Discontinue if patient experiences worsened bronchospasm, or secretions have lessened to the point that the patient is able to clear them with a cough. Anti-inflammatory and Combination Medications:    1. If the patient lacks prior history of lung disease, is not using inhaled anti-inflammatory medication at home, and lacks wheezing by examination or by history for at least 24 hours, contact physician for possible discontinuation.

## 2020-09-29 NOTE — CARE COORDINATION
Case Management Daily Note                    Date: 9/29/2020     Patient Name: Sissy Davis    Date of Admission: 9/28/2020  3:10 PM  YOB: 1970    Length of Stay: 1         Patient Admission Status: Inpatient  Diagnosis:Pulmonary embolism, bilateral ([address] [I26.99]  Pulmonary embolism, bilateral ([address] [I26.99]     ________________________________________________________________________________________  Discharge Plan: Home  From home     Insurance: Payor: [employer] / Plan: [address]  EMP / Product Type: *No Product type* /   Is pre-cert/notification needed: Yes, pre-cert would be needed     Tentative discharge date: TBD     Current barriers: Medical Clearance. COVID pending, hep gtt. Referrals completed: Not Applicable    Resources/ information provided: Not indicated at this time   ________________________________________________________________________________________  PT AM-PAC:   / 24 per last evaluation on: pending     OT AM-PAC:   / 24 per last evaluation on: pending     DME Needs for discharge: defer  ________________________________________________________________________________________  Notes/Plan of Care:   Patient remains in COVID-19 precautions precluding routine rounding/contact. All efforts made to respect recommendations of social distancing and decrease PPE consumption. SW unable to assess on this date. Patient is from home at baseline. Admitted for bilateral PE's. SW to see patient at bedside once COIVD rule out removed and/or will call into room tomorrow. Patient has no reported home needs at this time per bedside team. Therapy to see patient prior to discharge. Eliquis at home PTA. SW to assess and follow. Muna Sunshine and/or his family were provided with choice of provider; he and/or his family are in agreement with the discharge plan at this time.     Care Transition Patient: Yes    Burnis Force, MSW  [facility] - ICU   Case Management Department  Ph: [phone]

## 2020-09-30 NOTE — PLAN OF CARE
Problem: Falls - Risk of:  Goal: Will remain free from falls  Description: Will remain free from falls  9/30/2020 1620 by Loraine Santiago RN  Outcome: Ongoing  Note: Pt alert and oriented, able to make needs known. Pt on strict bedrest. Pt has bed alarm in place. Pt has B/L DVT's as well as P.E. Pt has call light within reach, calls out appropriately. Problem: Respiratory:  Goal: Ability to maintain adequate ventilation will improve  Description: Ability to maintain adequate ventilation will improve  9/30/2020 1620 by Loraine Santiago RN  Outcome: Ongoing  Note: Pt has SOB at rest and on exertion. Pt is on 8L O2 per high flow nasal cannula. Pt has Steroids, breathing treatments, and IS ordered to improve ventilation.

## 2020-09-30 NOTE — PROGRESS NOTES
4 Eyes Admission Assessment     I agree as the admission nurse that 2 RN's have performed a thorough Head to Toe Skin Assessment on the patient. ALL assessment sites listed below have been assessed on admission. Areas assessed by both nurses: Yes  [x]   Head, Face, and Ears   [x]   Shoulders, Back, and Chest  [x]   Arms, Elbows, and Hands  - bruising left arm   [x]   Coccyx, Sacrum, and Ischum  [x]   Legs, Feet, and Heels        Does the Patient have Skin Breakdown?   No         Robel Prevention initiated:  Yes   Wound Care Orders initiated:  NA      [facility] nurse consulted for Pressure Injury (Stage 3,4, Unstageable, DTI, NWPT, and Complex wounds):  NA      Nurse 1 eSignature: Electronically signed by Jabier Armijo RN on 9/30/20 at 2:03 AM EDT    **SHARE this note so that the co-signing nurse is able to place an eSignature**    Nurse 2 eSignature: Electronically signed by Nolberto Grant RN on 9/30/2020 at 7:04 AM

## 2020-09-30 NOTE — PROGRESS NOTES
intact, grossly non-focal.  Psychiatric: Alert and oriented, thought content appropriate, normal insight  Capillary Refill: Brisk,< 3 seconds   Peripheral Pulses: +2 palpable, equal bilaterally       Labs:   Recent Labs     09/28/20  1536 09/29/20  0300 09/30/20  0548   WBC 24.1* 23.0* 24.2*   HGB 15.7 13.7 13.9   HCT 47.3 40.8 41.6    159 130*     Recent Labs     09/28/20  1536 09/29/20  0300 09/30/20  0548   * 124* 122*   K 5.4* 4.6 4.6   CL 87* 90* 91*   CO2 18* 19* 18*   BUN 30* 34* 37*   CREATININE 0.9 1.2 0.9   CALCIUM 8.1* 7.8* 7.8*     Recent Labs     09/29/20  1229 09/30/20  0548   * 213*   * 117*   BILIDIR 4.6* 4.4*   BILITOT 5.3* 5.3*   ALKPHOS 780* 770*     Recent Labs     09/28/20  1536 09/29/20  1229   INR 1.58* 1.56*     Recent Labs     09/28/20  1536   TROPONINI <0.01       Urinalysis:    No results found for: [place] Mig[place], BACTERIA, RBCUA, BLOODU, SPECGRAV, GLUCOSEU    Radiology:  IR [address] [place] [address] INFUSE INIT [address]   Final Result      Successful placement of 6 [place] EKOS catheters into bilateral pulmonary arteries for bilateral pulmonary arterial thrombolysis. CTA CHEST W CONTRAST   Final Result      1. Acute pulmonary emboli involving the right and left main pulmonary arteries and the lobar and segmental branches supplying the right upper lobe, right middle lobe, right lower lobe, lingula, left upper lobe and left lower lobe. . Right heart strain. Right lower lung mass consistent with neoplasm. Diffuse reticulation and reticular nodular opacities right lung likely relates to lymphangitic spread of tumor. Bronchial thickening likely relates to peribronchial spread of tumor. Groundglass opacities left upper lobe right upper lobe and left lower lobe indeterminate. Findings may relate to atelectasis pneumonitis or inflammatory /infectious process. Marked lymphadenopathy involving the right hilum ,mediastinum and right supraclavicular region. Large volume of ascites. Findings called as a critical result to the emergency room by Dr. Kathe Alex at the time of dictation 9/28/2020 5:00 PM      XR CHEST PORTABLE   Final Result      Right lower lung intrapulmonary mass most consistent with neoplasm. Mediastinal and hilar lymphadenopathy likely relates to metastasis. Reticular nodular opacities noted diffusely in the right lung indeterminate. Lymphangitic spread of tumor is not excluded. US LIVER    (Results Pending)           Assessment/Plan:  Eb Chaves is a 48 y.o. male, who was admitted for multivessel PE w/EKOS treatment. Acute hypoxic respiratory failure 2/2 volume overload  -Recommend starting Lasix and aldactone   -Patient will require diagnostic and therapeutic paracentesis    -Echocardiogram   -RUQ ultrasound   -S/P EKOS   -Continue heparin drip for submassive PE   -Continue supplemental oxygen (currently on 8 L HFNC)     Submassive PE s/p EKOS   -Continue heparin infusion   -Echocardiogram to evaluate for right heart strain     Ascites   -Likely cardiac in origin   -Agree with echo and liver US   -Patient will need paracentesis   -Recommend starting diuretics as above       I will discuss the patient with attending physician, Dr. Harry Reed MD.     Elysia Draper MD.   Internal Medicine Resident PGY-3  [place]       Patient was seen, examined and discussed with Dr. Jaquan Burns. I agree with the history of present illness, past medical/surgical histories, family history, social history, medication list and allergies as listed. The review of systems is as noted above. My physical exam confirms the findings listed   Chart was reviewed including labs CT scan, EKG and medical records confirm the findings noted     Pulmonary embolism. Submassive based on the presence of right heart failure.   Recent hx of DVT - he stopped Eliquis   Metastatic lung cancer   Ascites: not clear if it is due to right heart failure, metastasis or clotting in the portal system. Central line infection     SOB, hypoxia, requiring 8  liters. Will attempt paracentesis, hopefully this will help with the SOB.     Continue heparin drip

## 2020-09-30 NOTE — PROGRESS NOTES
255 lb 9.6 oz (115.9 kg)   Height:         Physical Exam:    Constitutional: Awake. Well-developed and well-nourished. No acute distress. HEENT: Normocephalic and atraumatic. No scleral icterus. Cardiovascular: Regular rate. Normal S1, S2. No m/r/g. Pulmonary: Increased of breathing. CTAB. SpO2 96% on 8 lpm.  Gastrointestinal: BS+. Protuberant. Soft. No tenderness. Distended. Musculoskeletal: No peripheral edema. Skin: No cyanosis or pallor. Neurological: Alert and oriented to person, place, time, and situation. Labs:  CBC:   Recent Labs     20  1536 20  0300 20  0548   WBC 24.1* 23.0* 24.2*   HGB 15.7 13.7 13.9   HCT 47.3 40.8 41.6    159 130*       BMP:   Recent Labs     20  1536 20  0300 20  0548   * 124* 122*   K 5.4* 4.6 4.6   CL 87* 90* 91*   CO2 18* 19* 18*   BUN 30* 34* 37*   CREATININE 0.9 1.2 0.9   GLUCOSE 109* 163* 120*     LFT's:   Recent Labs     20  1229 20  0548   * 213*   * 117*   BILITOT 5.3* 5.3*   ALKPHOS 780* 770*     Cardiac:   Recent Labs     20  1536   TROPONINI <0.01     Coagulation:   Recent Labs     20  1536 20  1229   PROTIME 18.4* 18.2*   INR 1.58* 1.56*     Lactate: No results for input(s): LACTATE, LACTSEPSIS in the last 72 hours. ABG: No results for input(s): PHART, FBD5TUS, PO2ART, JZB4DXL in the last 72 hours. U/A:No results for input(s): Melani [place], VICKY, [place]ish [place] in the last 72 hours. Microbiology Cultures:   Recent Labs     20   BC No Growth to date. Any change in status will be called. Recent Labs     20   BLOODCULT2 No Growth to date. Any change in status will be called. No results for input(s): LABURIN in the last 72 hours.       EC2020: Sinus tachycardiaRight axis deviationPulmonary disease patternSeptal infarct, age undeterminedAbnormal ECGLow voltage limb leads      Imaging:  IR THROMB VEIN INFUSE INIT TX DAY   Final Result      Successful placement of 6 [place] EKOS catheters into bilateral pulmonary arteries for bilateral pulmonary arterial thrombolysis. CTA CHEST W CONTRAST   Final Result      1. Acute pulmonary emboli involving the right and left main pulmonary arteries and the lobar and segmental branches supplying the right upper lobe, right middle lobe, right lower lobe, lingula, left upper lobe and left lower lobe. . Right heart strain. Right lower lung mass consistent with neoplasm. Diffuse reticulation and reticular nodular opacities right lung likely relates to lymphangitic spread of tumor. Bronchial thickening likely relates to peribronchial spread of tumor. Groundglass opacities left upper lobe right upper lobe and left lower lobe indeterminate. Findings may relate to atelectasis pneumonitis or inflammatory /infectious process. Marked lymphadenopathy involving the right hilum ,mediastinum and right supraclavicular region. Large volume of ascites. Findings called as a critical result to the emergency room by Dr. Kathe Alex at the time of dictation 9/28/2020 5:00 PM      XR CHEST PORTABLE   Final Result      Right lower lung intrapulmonary mass most consistent with neoplasm. Mediastinal and hilar lymphadenopathy likely relates to metastasis. Reticular nodular opacities noted diffusely in the right lung indeterminate. Lymphangitic spread of tumor is not excluded. US LIVER    (Results Pending)         Assessment and Plan:  Eb Chaves is a 48 y.o. male with a PMH of Stage ARNALDO adenocarcinoma of the R lung (cT2, cN2, CM1a) on carboplatin/pemetrexed c/b DVT and chronic PE on Eliquis, HTN, and HLD who presented with acute PE.     Pulmonary embolus with core pulmonale  S/p EKOS on 9/28  - Continue heparin gtt  - Holding home eliquis     Acute on chronic hypoxic resp failure  multifactorial- PE, lung cancer, possible pneumonia  - Supplemental O2 PRN     Possible pneumonia  - Continue meropenem  - Stop Vanc  - 9/28 Blood cx NGTF  - MRSA negative , COVID negative     New onset ascites  Possibly 2/2 new CHF, possible 2/2 liver pathology  Markedly elevated LFTs (AST, ALT, Alk Phos, Bilirubin)  - Echo pending  - Liver U/S pending  - IV lasix trial today to assess response     Lung adenocarcinoma stage IV  S/p one cycle of carbo/alimta in Aug. Now on novolumab/ipillimumab  CT is concerning for lymphangitic spread of cancer- pulmonology is reviewing his films.      Recent pseudomonas infection of the port:  - Continue meropenem     Hyponatremia:  Combination of hypervolemia from ascites, SIADH, poor oral intake. - Continue fluid restirction     This plan has been discussed with the attending physician, Dr. Sergey Gant.       Code Status: Full Code  FEN: DIET GENERAL; Daily Fluid Restriction: 1200 ml  PPX: hep gtt  DISPO: BHAVANA Moralez MD PGY-1  9/30/2020, 9:02 AM

## 2020-09-30 NOTE — PROGRESS NOTES
[address] [place] [address]  GI Progress Note        Lazara Mcgill is a 48 y.o. male patient. 1. Acute saddle pulmonary embolism with acute cor pulmonale (HCC)    2. Acute respiratory failure with hypoxia ([address]        Admit Date: 9/28/2020    Subjective:       Patient is sitting in bed comfortably this AM. He is still dyspneic, which gets worse throughout the day and is exacerbated by talking. He denies N/V, CP, F/C, or abdominal pain. He had a BM overnight without hematochezia or melena.      ROS:  Cardiovascular ROS: negative for chest pain, positive for tachycardia  Gastrointestinal ROS: no abdominal pain, change in bowel habits, or black or bloody stools  Respiratory ROS: positive for dyspnea    Scheduled Meds:   albuterol sulfate HFA  2 puff Inhalation 4x daily    meropenem  1 g Intravenous Q8H    sodium chloride flush  10 mL Intravenous 2 times per day       Continuous Infusions:   heparin (PORCINE) Infusion 16 Units/kg/hr (09/29/20 2049)       PRN Meds:  melatonin, heparin (porcine), heparin (porcine), acetaminophen **OR** acetaminophen, polyethylene glycol, promethazine **OR** ondansetron, perflutren lipid microspheres, sodium chloride flush      Objective:       Patient Vitals for the past 24 hrs:   BP Temp Temp src Pulse Resp SpO2 Weight   09/30/20 0531 126/87 97.8 °F (36.6 °C) Oral 103 28 93 % 255 lb 9.6 oz (115.9 kg)   09/30/20 0106 115/78 98.3 °F (36.8 °C) Oral 109 22 95 % --   09/29/20 2321 -- -- -- -- 22 97 % --   09/29/20 2300 113/73 -- -- 107 24 94 % --   09/29/20 2200 111/77 -- -- 110 10 93 % --   09/29/20 2100 (!) 123/96 -- -- 105 20 92 % --   09/29/20 2028 127/84 97.6 °F (36.4 °C) Temporal 107 27 96 % --   09/29/20 1830 (!) 130/99 -- -- 111 29 94 % --   09/29/20 1809 -- -- -- -- (!) 31 95 % --   09/29/20 1800 110/76 -- -- 103 (!) 36 94 % --   09/29/20 1730 125/75 -- -- 105 26 97 % --   09/29/20 1700 88/63 -- -- 106 23 96 % --   09/29/20 1630 113/67 -- -- 107 27 96 % --   09/29/20 1600 105/65 -- -- 105 28 95 % --   20 1530 103/61 -- -- 105 18 97 % --   20 1500 115/70 97.4 °F (36.3 °C) Temporal 106 26 98 % --   20 1430 (!) 111/93 -- -- 103 30 99 % --   20 1400 107/74 -- -- 106 (!) 34 97 % --   20 1330 109/69 -- -- 111 (!) 32 95 % --   20 1300 127/77 -- -- 108 22 94 % --   20 1200 131/77 -- -- 108 25 93 % --   20 1130 124/84 -- -- 110 (!) 33 95 % --   20 1100 125/82 97.2 °F (36.2 °C) Temporal 108 (!) 35 93 % --   20 1030 96/82 -- -- 110 (!) 35 94 % --   20 1000 (!) 110/90 -- -- 110 27 95 % --   20 0930 114/68 -- -- 104 24 96 % --       Exam:  VITALS:  /87   Pulse 103   Temp 97.8 °F (36.6 °C) (Oral)   Resp 28   Ht 5' 7\" (1.702 m)   Wt 255 lb 9.6 oz (115.9 kg)   SpO2 93%   BMI 40.03 kg/m²   TEMPERATURE:  Current - Temp: 97.8 °F (36.6 °C); Max - Temp  Av.7 °F (36.5 °C)  Min: 97.2 °F (36.2 °C)  Max: 98.3 °F (36.8 °C)    NAD  General appearance: alert, appears stated age, cooperative and no distress  Head: Normocephalic, without obvious abnormality, atraumatic  Neck: supple, symmetrical, trachea midline and thyroid not enlarged, symmetric, no tenderness/mass/nodules  CVS:  RRR, Nl s1s2  Lungs Decreased breath sounds of bilateral bases  Abdomen: abnormal findings:  distended, shifting dullness. Nontender.   AAOx3, No asterixis or encephalopathy  Extremities: Edema of BL LE's      Recent Labs     20  1536 20  0300 20  0548   WBC 24.1* 23.0* 24.2*   HGB 15.7 13.7 13.9   HCT 47.3 40.8 41.6   MCV 85.2 84.1 84.7    159 130*     Recent Labs     20  1536 20  0300 20  0548   * 124* 122*   K 5.4* 4.6 4.6   CL 87* 90* 91*   CO2 18* 19* 18*   BUN 30* 34* 37*   CREATININE 0.9 1.2 0.9     Recent Labs     20  1229 20  0548   * 213*   * 117*   BILIDIR 4.6* 4.4*   BILITOT 5.3* 5.3*   ALKPHOS 780* 770*     No results for input(s): LIPASE, AMYLASE in the last 72

## 2020-10-01 NOTE — PROGRESS NOTES
[facility]  Progress Note    Subjective:     Because the patient is in COVID-19 isolation, he was not seen in person in an effort to minimize risk of transmission and to reduce the use of personal protective equipment. Review of Systems:     Review of Systems   Unable to perform ROS: Other     Because the patient is in COVID-19 isolation, he was not seen in person in an effort to minimize risk of transmission and to reduce the use of personal protective equipment.     Objective:     Medications    Current Facility-Administered Medications: melatonin tablet 6 mg, 6 mg, Oral, Nightly PRN  guaiFENesin-dextromethorphan (ROBITUSSIN DM) 100-10 MG/5ML syrup 5 mL, 5 mL, Oral, Q4H PRN  heparin (porcine) injection 8,530 Units, 80 Units/kg, Intravenous, PRN  heparin (porcine) injection 4,260 Units, 40 Units/kg, Intravenous, PRN  heparin 25,000 units in dextrose 5% 250 mL infusion, 16 Units/kg/hr, Intravenous, Continuous  albuterol sulfate  (90 Base) MCG/ACT inhaler 2 puff, 2 puff, Inhalation, 4x daily  acetaminophen (TYLENOL) tablet 650 mg, 650 mg, Oral, Q6H PRN **OR** acetaminophen (TYLENOL) suppository 650 mg, 650 mg, Rectal, Q6H PRN  polyethylene glycol (GLYCOLAX) packet 17 g, 17 g, Oral, Daily PRN  promethazine (PHENERGAN) tablet 12.5 mg, 12.5 mg, Oral, Q6H PRN **OR** ondansetron (ZOFRAN) injection 4 mg, 4 mg, Intravenous, Q6H PRN  perflutren lipid microspheres (DEFINITY) injection 1.65 mg, 1.5 mL, Intravenous, ONCE PRN  meropenem (MERREM) 1 g in sodium chloride 0.9 % 100 mL IVPB (mini-bag), 1 g, Intravenous, Q8H  sodium chloride flush 0.9 % injection 10 mL, 10 mL, Intravenous, 2 times per day  sodium chloride flush 0.9 % injection 10 mL, 10 mL, Intravenous, PRN    Allergies  Allergies   Allergen Reactions    Amoxicillin        Physical Exam  VITALS:  /62   Pulse 106   Temp 98.4 °F (36.9 °C) (Oral)   Resp 22   Ht 5' 7\" (1.702 m)   Wt 250 lb 1.6 oz (113.4 kg)   SpO2 94%   BMI 39.17 kg/m²   TEMPERATURE:  Current - Temp: 98.4 °F (36.9 °C); Max - Temp  Av °F (36.7 °C)  Min: 97.7 °F (36.5 °C)  Max: 98.4 °F (36.9 °C)  PULSE OXIMETRY RANGE: SpO2  Av.2 %  Min: 92 %  Max: 97 %  24HR INTAKE/OUTPUT:      Intake/Output Summary (Last 24 hours) at 10/1/2020 0710  Last data filed at 10/1/2020 0414  Gross per 24 hour   Intake 1355.22 ml   Output 1875 ml   Net -519.78 ml       Physical Exam  Because the patient is in COVID-19 isolation, he was not seen in person in an effort to minimize risk of transmission and to reduce the use of personal protective equipment. Labs  Recent Labs     20  0300 20  0548 10/01/20  0420   WBC 23.0* 24.2* 25.8*   HGB 13.7 13.9 14.2   HCT 40.8 41.6 42.2    130* 109*   MCV 84.1 84.7 84.7       Recent Labs     20  0300 20  0548 10/01/20  0420   * 122* 126*   K 4.6 4.6 4.8   CL 90* 91* 92*   CO2 19* 18* 20*   BUN 34* 37* 41*   CREATININE 1.2 0.9 1.2       Recent Labs     20  1229 20  0548 10/01/20  0420   * 213* 206*   * 117* 113*   BILIDIR 4.6* 4.4* 5.3*   BILITOT 5.3* 5.3* 6.1*   ALKPHOS 780* 770* 780*       No results for input(s): MG in the last 72 hours. Radiology  Xr Chest Portable    Result Date: 10/1/2020  XR CHEST PORTABLE Indication: Pulmonary embolus, Lung Ca, Possible PNA COMPARISON: 2020 Findings: Portable AP upright view of the chest was obtained. The heart is stable in size and configuration. Right lower lobe pulmonary mass lesion is again identified. Diffuse reticular opacities are again noted bilaterally, right greater than left. There is no new consolidative opacity. No pneumothorax or effusion. Prominence of the right paratracheal stripe is again noted indicating lymphadenopathy. Impression: Findings similar to the prior study with right lower lobe pulmonary mass lesion, diffuse reticular opacities and mediastinal lymphadenopathy.     Xr Chest Portable    Result Date: 9/28/2020  Chest portable. HISTORY: Shortness of breath. COMPARISON: CT PET August 17, 2020 Heart size is normal Right lower lobe pulmonary parenchymal mass measures 4 cm present previously most consistent with neoplasm. Marked diffuse reticulonodular opacities within the right lung involving the right upper lobe, right middle lobe and right lower lobe may relate to pneumonitis or edema or lymphangitic spread of tumor. No lobar consolidation. Findings unchanged. Marked right paratracheal and subcarinal lymphadenopathy and right hilar lymphadenopathy most consistent with lymph node metastasis. Right lower lung intrapulmonary mass most consistent with neoplasm. Mediastinal and hilar lymphadenopathy likely relates to metastasis. Reticular nodular opacities noted diffusely in the right lung indeterminate. Lymphangitic spread of tumor is not excluded. Cta Chest W Contrast    Result Date: 9/28/2020  EXAM: CT CHEST WITH CONTRAST. CT pulmonary angiogram. INDICATION: Shortness of breath. COMPARISON: Chest portable September 28, 2020. CT chest August 3, 2020 TECHNIQUE: Axial CT imaging of the chest was performed. Axial images, multiplanar reformatted images and axial maximum intensity projection were reviewed. Individualized dose optimization technique was used in order to meet ALARA standards for radiation dose reduction. In addition to vendor specific dose reduction algorithms, the dose reduction techniques vary based on the specific scanner utilized but frequently include automated exposure control, adjustment of the mA and/or kV according to patient size, and use of iterative reconstruction technique. Multiplanar reconstructed images and MIP images for CT angiogram. CONTRAST: 80 mL [id number] FINDINGS: LUNGS AND AIRWAYS: Bronchial wall thickening involving the lower lobe bronchi.   Marked reticulation diffusely within the right lung within the right upper lobe, right middle lobe and right lower lobe which may relate to lymphangitic spread of tumor. Marked groundglass opacity left lower lobe and left upper lobe less pronounced right upper lobe may relate to atelectasis or pneumonitis or acute atypical inflammatory infectious etiology. Enhancing mass within the right lower lobe measures 5 x 5 cm consistent with patient's primary neoplasm present previously. PLEURA: Tiny right pleural effusion measures 10 mm in thickness HEART / GREAT VESSELS: The thoracic aorta is normal. No aneurysm. No dissection. Acute pulmonary embolism involving the right and left main pulmonary arteries and the lobar branches of the pulmonary arteries supplying the right upper lobe, right middle lobe, right lower lobe, left upper lobe, lingula and left lower lobe as well as the segmental vessels supplying the lungs diffusely. There is evidence for right heart strain. . Small pericardial effusion. ADENOPATHY/MEDIASTINUM: Right supraclavicular, right paratracheal, precarinal, subcarinal, right hilar and AP window lymphadenopathy present previously. CHEST WALL / LOWER NECK: Right supraclavicular lymphadenopathy present previously UPPER ABDOMEN: Limited evaluation due to timing of imaging. Moderate volume of ascites noted. BONES: No acute abnormality. . Atrophy involving the musculature of the shoulder girdle on the right. 1. Acute pulmonary emboli involving the right and left main pulmonary arteries and the lobar and segmental branches supplying the right upper lobe, right middle lobe, right lower lobe, lingula, left upper lobe and left lower lobe. . Right heart strain. Right lower lung mass consistent with neoplasm. Diffuse reticulation and reticular nodular opacities right lung likely relates to lymphangitic spread of tumor. Bronchial thickening likely relates to peribronchial spread of tumor. Groundglass opacities left upper lobe right upper lobe and left lower lobe indeterminate.  Findings may relate to atelectasis pneumonitis or inflammatory

## 2020-10-01 NOTE — PROGRESS NOTES
[address] [place] [address]  GI Progress Note        Austin Chavis is a 48 y.o. male patient. 1. Acute saddle pulmonary embolism with acute cor pulmonale (HCC)    2. Acute respiratory failure with hypoxia (HCC)    3. Other ascites        Admit Date: 9/28/2020    Subjective:       Patient is sitting in bed this AM. He is still dyspneic, which gets worse throughout the day and is exacerbated by talking. He denies N/V, CP, F/C, or abdominal pain. BMs have been normal. He continues to have LE edema bilaterally.     ROS:  Cardiovascular ROS: negative for chest pain, positive for tachycardia  Gastrointestinal ROS: no abdominal pain, change in bowel habits, or black or bloody stools  Respiratory ROS: positive for dyspnea    Scheduled Meds:   albuterol sulfate HFA  2 puff Inhalation 4x daily    meropenem  1 g Intravenous Q8H    sodium chloride flush  10 mL Intravenous 2 times per day       Continuous Infusions:   heparin (PORCINE) Infusion 16 Units/kg/hr (10/01/20 0411)       PRN Meds:  melatonin, guaiFENesin-dextromethorphan, heparin (porcine), heparin (porcine), acetaminophen **OR** acetaminophen, polyethylene glycol, promethazine **OR** ondansetron, perflutren lipid microspheres, sodium chloride flush      Objective:       Patient Vitals for the past 24 hrs:   BP Temp Temp src Pulse Resp SpO2 Weight   10/01/20 0415 -- -- -- -- -- 94 % --   10/01/20 0413 123/62 98.4 °F (36.9 °C) Oral 106 22 92 % 250 lb 1.6 oz (113.4 kg)   10/01/20 0014 -- -- -- -- -- 94 % --   10/01/20 0008 123/79 97.8 °F (36.6 °C) Oral 102 24 93 % --   09/30/20 2045 115/75 98.3 °F (36.8 °C) Oral 110 24 93 % --   09/30/20 2014 -- -- -- -- -- 95 % --   09/30/20 1522 119/71 97.7 °F (36.5 °C) Oral 108 22 95 % --   09/30/20 1230 -- -- -- -- 22 93 % --   09/30/20 1155 125/76 97.7 °F (36.5 °C) Oral 109 24 93 % --   09/30/20 1005 -- -- -- -- 22 97 % --   09/30/20 0924 113/79 98 °F (36.7 °C) Oral 102 26 97 % --       Exam:  VITALS:  /62   Pulse 106 Temp 98.4 °F (36.9 °C) (Oral)   Resp 22   Ht 5' 7\" (1.702 m)   Wt 250 lb 1.6 oz (113.4 kg)   SpO2 94%   BMI 39.17 kg/m²   TEMPERATURE:  Current - Temp: 98.4 °F (36.9 °C); Max - Temp  Av °F (36.7 °C)  Min: 97.7 °F (36.5 °C)  Max: 98.4 °F (36.9 °C)    NAD  General appearance: alert, appears stated age, cooperative and no distress  Head: Normocephalic, without obvious abnormality, atraumatic  Neck: supple, symmetrical, trachea midline and thyroid not enlarged, symmetric, no tenderness/mass/nodules  CVS:  RRR, Nl s1s2  Lungs Decreased breath sounds of bilateral bases  Abdomen: abnormal findings:  distended, shifting dullness. Nontender. AAOx3, No asterixis or encephalopathy  Extremities: Edema of BL LE's      Recent Labs     20  03020  0548 10/01/20  0420   WBC 23.0* 24.2* 25.8*   HGB 13.7 13.9 14.2   HCT 40.8 41.6 42.2   MCV 84.1 84.7 84.7    130* 109*     Recent Labs     20  0300 20  0548 10/01/20  0420   * 122* 126*   K 4.6 4.6 4.8   CL 90* 91* 92*   CO2 19* 18* 20*   BUN 34* 37* 41*   CREATININE 1.2 0.9 1.2     Recent Labs     20  1229 20  0548 10/01/20  0420   * 213* 206*   * 117* 113*   BILIDIR 4.6* 4.4* 5.3*   BILITOT 5.3* 5.3* 6.1*   ALKPHOS 780* 770* 780*     No results for input(s): LIPASE, AMYLASE in the last 72 hours. Recent Labs     20  1536  20  1229 20  0548 20  1006 10/01/20  0420   PROT  --    < > 6.1* 5.9* 5.8* 5.9*   INR 1.58*  --  1.56*  --   --   --     < > = values in this interval not displayed. Assessment:       Active Problems:    Pulmonary embolism, bilateral (HCC)  Resolved Problems:    * No resolved hospital problems.  *    1) New-onset ascites- likely 2/2 portal HTN   10/1 Paracentesis SAAG 1.7 (portal HTN is likely cause with 97% accuracy), fluid protein 1.3 consistent with hepatic origin of transudate not cardiac  2) Transaminitis with hyperbilirubinemia- assess for underlying liver disease, drug-induced hepatotoxicity. Negative hepatitis panel, negative anti-nuclear ab.   3) Elevated ALP - Biliary obstruction vs PBC    Recommendations:       - Abdominal U/S with doppler of portal hepatic veins to assess for portal vein thrombosis (likely will be deferred until negative COVID test)  - Received lasix 40mg today  - Continue fluid restriction  - Serial liver chem  - Anti-mitochondrial Ab pending   - Consider repeat therapeutic large-volume paracentesis       Thank you for the opportunity to participate in [facility].       This patient was discussed with Aby Luciano MD.    ---------------------------  Catrachita Pettit MD  PGY-1  10/1/2020  8:00 AM     Addendum: would put on aldactone and lasix but renal function top normal will need to watch closely would do large vol tap with IV albumin 8 gms per liter removed

## 2020-10-01 NOTE — PLAN OF CARE
Problem: Skin Integrity:  Goal: Absence of new skin breakdown  Outcome: Met This Shift     Problem: Falls - Risk of:  Goal: Will remain free from falls  Outcome: Ongoing     Problem: Respiratory:  Goal: Ability to maintain adequate ventilation will improve  Outcome: Ongoing     Problem: Bleeding:  Goal: Will show no signs and symptoms of excessive bleeding  Outcome: Ongoing     Problem: Isolation Precautions - Risk of Spread of Infection  Goal: Prevent transmission of infection  Outcome: Ongoing

## 2020-10-01 NOTE — PROGRESS NOTES
22 20 24 20   Temp: 97.7 °F (36.5 °C)  98.1 °F (36.7 °C)    TempSrc: Oral  Oral    SpO2: 93% 94% 92% 90%   Weight:       Height:         Physical Exam:    Constitutional: Awake. Well-developed and well-nourished. No acute distress. HEENT: Normocephalic and atraumatic. No scleral icterus. Cardiovascular: Regular rate. Normal S1, S2. No m/r/g. Pulmonary: Mildly increased work of breathing improved from yesterday. Still some conversational dyspnea. CTAB. SpO2 96% on 8 lpm.  Gastrointestinal: BS+. Protuberant. Soft. No tenderness. Distended. Musculoskeletal: No peripheral edema. Skin: No cyanosis or pallor. Neurological: Alert and oriented to person, place, time, and situation. Labs:  CBC:   Recent Labs     09/29/20  0300 09/30/20  0548 10/01/20  0420   WBC 23.0* 24.2* 25.8*   HGB 13.7 13.9 14.2   HCT 40.8 41.6 42.2    130* 109*       BMP:   Recent Labs     09/29/20  0300 09/30/20  0548 10/01/20  0420   * 122* 126*   K 4.6 4.6 4.8   CL 90* 91* 92*   CO2 19* 18* 20*   BUN 34* 37* 41*   CREATININE 1.2 0.9 1.2   GLUCOSE 163* 120* 113*     LFT's:   Recent Labs     09/29/20  1229 09/30/20  0548 10/01/20  0420   * 213* 206*   * 117* 113*   BILITOT 5.3* 5.3* 6.1*   ALKPHOS 780* 770* 780*     Cardiac:   Recent Labs     09/28/20  1536   TROPONINI <0.01     Coagulation:   Recent Labs     09/28/20  1536 09/29/20  1229   PROTIME 18.4* 18.2*   INR 1.58* 1.56*     Lactate: No results for input(s): LACTATE, LACTSEPSIS in the last 72 hours. ABG: No results for input(s): PHART, XEI6RGK, PO2ART, EST2TQR in the last 72 hours. U/A:No results for input(s): VICKY Melgar, Cherrie Whitney in the last 72 hours. Microbiology Cultures:   Recent Labs     09/28/20 2152   BC No Growth to date. Any change in status will be called. Recent Labs     09/28/20 2148   BLOODCULT2 No Growth to date. Any change in status will be called.      No results for input(s): [facility] in the last 72 hours. EC2020: Sinus tachycardiaRight axis deviationPulmonary disease patternSeptal infarct, age undeterminedAbnormal ECGLow voltage limb leads      Imaging:  XR CHEST PORTABLE   Final Result   Impression: Findings similar to the prior study with right lower lobe pulmonary mass lesion, diffuse reticular opacities and mediastinal lymphadenopathy. IR THROMB VEIN INFUSE INIT TX DAY   Final Result      Successful placement of 6 Israeli EKOS catheters into bilateral pulmonary arteries for bilateral pulmonary arterial thrombolysis. CTA CHEST W CONTRAST   Final Result      1. Acute pulmonary emboli involving the right and left main pulmonary arteries and the lobar and segmental branches supplying the right upper lobe, right middle lobe, right lower lobe, lingula, left upper lobe and left lower lobe. . Right heart strain. Right lower lung mass consistent with neoplasm. Diffuse reticulation and reticular nodular opacities right lung likely relates to lymphangitic spread of tumor. Bronchial thickening likely relates to peribronchial spread of tumor. Groundglass opacities left upper lobe right upper lobe and left lower lobe indeterminate. Findings may relate to atelectasis pneumonitis or inflammatory /infectious process. Marked lymphadenopathy involving the right hilum ,mediastinum and right supraclavicular region. Large volume of ascites. Findings called as a critical result to the emergency room by Dr. Sam Pascual at the time of dictation 2020 5:00 PM      XR CHEST PORTABLE   Final Result      Right lower lung intrapulmonary mass most consistent with neoplasm. Mediastinal and hilar lymphadenopathy likely relates to metastasis. Reticular nodular opacities noted diffusely in the right lung indeterminate. Lymphangitic spread of tumor is not excluded.       US LIVER    (Results Pending)         Assessment and Plan:  Tana Flores is a 48 y.o. male with a PMH of Stage ARNALDO adenocarcinoma of the R lung (cT2, cN2, CM1a) on carboplatin/pemetrexed c/b DVT and chronic PE on Eliquis, HTN, and HLD who presented with acute PE. Pulmonary embolus with core pulmonale  S/p EKOS on 9/28  - Continue heparin gtt  - Holding home eliquis     Acute on chronic hypoxic resp failure  multifactorial- PE, lung cancer, possible pneumonia  - Supplemental O2 PRN     Possible pneumonia  - Continue meropenem  - Stopp Vanc 9/30  - 9/28 Blood cx NGTF  - MRSA negative , COVID negative     New onset ascites  Likely 2/2 liver pathology, possibly 2/2 CHF  Markedly elevated LFTs (AST, ALT, Alk Phos, Bilirubin)  10/1 Paracentesis SAAG 1.7 (portal HTN is likely cause with 97% accuracy), fluid protein 1.3 consistent with hepatic origin of transudate not cardiac  - Echo pending  - Liver U/S pending  - Paracentesis 9/30, 1.7 L removed. - Responded well to lasix trial 9/30, no more lasix for now d/t possibly increasing Cr    Lung adenocarcinoma stage IV  S/p one cycle of carbo/alimta in Aug. Now on novolumab/ipillimumab  CT is concerning for lymphangitic spread of cancer- pulmonology is reviewing his films.      Recent pseudomonas infection of the port:  - Continue meropenem     Hyponatremia:  Combination of hypervolemia from ascites, SIADH, poor oral intake. - Continue fluid restirction     Thrombocytopenia:  Likely 2/2 acute illness  - HIT antibody r/o    This plan has been discussed with the attending physician, Dr. Marichuy Leonechjose. Code Status: Full Code  FEN: DIET GENERAL; Daily Fluid Restriction: 1200 ml  PPX: hep gtt  DISPO: TELLO Adair MD PGY-1  10/1/2020, 1:34 PM     Patient seen and examined, plan of care discussed with residents. Agree with their assessment and plan with following addendum:  Patient has slightly improved respiratory status. Still requiring supplemental O2 at 6-7 liters. Awaiting COVID 19.  Unable to get liver ultrasound - RN called department and unable to do at

## 2020-10-01 NOTE — PROGRESS NOTES
34* 37* 41*   CREATININE 1.2 0.9 1.2     LIVER PROFILE:   Recent Labs     09/29/20  1229 09/30/20  0548 10/01/20  0420   * 213* 206*   * 117* 113*   BILIDIR 4.6* 4.4* 5.3*   BILITOT 5.3* 5.3* 6.1*   ALKPHOS 780* 770* 780*     PT/INR:   Recent Labs     09/28/20  1536 09/29/20  1229   PROTIME 18.4* 18.2*   INR 1.58* 1.56*     APTT:   Recent Labs     09/30/20  0550 09/30/20  2213 10/01/20  0420   APTT 63.7* 72.1* 70.7*     UA:No results for input(s): NITRITE, COLORU, PHUR, LABCAST, WBCUA, RBCUA, MUCUS, TRICHOMONAS, YEAST, BACTERIA, CLARITYU, SPECGRAV, LEUKOCYTESUR, UROBILINOGEN, BILIRUBINUR, BLOODU, GLUCOSEU, AMORPHOUS in the last 72 hours. Invalid input(s): Roseann Valenzuela      Assessment/Plan:  48 y.o. male with     Pulmonary embolism. Submassive based on the presence of right heart failure. Recent hx of DVT - he stopped Eliquis   Hypoxia - he was on 8 liters yesterday. Now he is at 6-7 liters. Metastatic lung cancer   Ascites: paracentesis on 9/30. Albumin in the fluid is 0.7 with SAAG > 1.1. Echo and US are pending    Lactic acidemia with normal BP. This is likely due to tumor burden which is poor prognostic sign. Continue heparin drip  Give additional dose of lasix. Creatinine is stable. Hyponatremia is better.       Bandar Valentine MD

## 2020-10-01 NOTE — PLAN OF CARE
Problem: Falls - Risk of:  Goal: Will remain free from falls  Description: Will remain free from falls  9/30/2020 1620 by Aditi Myles RN  Outcome: Ongoing  Note: Pt alert and oriented, able to make needs known. Pt on strict bedrest. Pt has bed alarm in place. Pt has B/L DVT's as well as P.E. Pt has call light within reach, calls out appropriately. Problem: Respiratory:  Goal: Ability to maintain adequate ventilation will improve  Description: Ability to maintain adequate ventilation will improve  9/30/2020 1620 by Aditi Myles RN  Outcome: Ongoing  Note: Pt has SOB at rest and on exertion. Pt is on 7L O2 per high flow nasal cannula. Pt has Steroids, breathing treatments, and IS ordered to improve ventilation.

## 2020-10-01 NOTE — CARE COORDINATION
Case Management Assessment           Daily Note                 Date/ Time of Note: 10/1/2020 9:47 AM         Note completed by: Alpa Gutierrez    Patient Name: Nova Baptiste  YOB: 1970    Diagnosis:Pulmonary embolism, bilateral ([address] [I26.99]  Pulmonary embolism, bilateral ([address] [I26.99]  Patient Admission Status: Inpatient    Date of Admission:9/28/2020  3:10 PM Length of Stay: 3 GLOS:      Current Plan of Care: s/p paracentesis, awaiting echo and liver ultrasound, currently on 7L O2  ________________________________________________________________________________________  PT AM-PAC:   / 24 per last evaluation on:     OT AM-PAC:   / 24 per last evaluation on:     DME Needs for discharge: n/a  ________________________________________________________________________________________  Discharge Plan: Home    Tentative discharge date: TBD    Current barriers to discharge: medical clearance      ________________________________________________________________________________________  Case Management Notes: Patient is from home with family, independent pta, uses O2 only at night. CM will continue to follow as pt may have needs for [facility] at discharge. Randal Carter and his family were provided with choice of provider; he and his family are in agreement with the discharge plan.     Care Transition Patient: Lena Gutierrez RN  [facility]  Case Management Department  Ph: [phone]  Fax: [phone]

## 2020-10-01 NOTE — PROGRESS NOTES
Patient's wife called for updates, no answer.      Electronically signed by Virgen Macias RN on 10/1/2020 at 7:01 AM

## 2020-10-02 PROBLEM — I26.02 ACUTE SADDLE PULMONARY EMBOLISM WITH ACUTE COR PULMONALE (HCC): Status: ACTIVE | Noted: 2020-01-01

## 2020-10-02 NOTE — PROGRESS NOTES
0800 oral temperature checked - 93.7   Axillary temp checked - 92.3   Rectal temp not checked due to low platelets and critically high APTT    Dr. Jarrod Merlos and Dr. Tato Griggs made aware via perfect serve. Pt placed under two warm blankets and bear hugger. 0830 Temporal temp checked - 98.0   Bear hugger removed  MD at bedside  Will monitor.  Electronically signed by Irving Murray RN on 10/2/2020 at 8:30 AM

## 2020-10-02 NOTE — PROGRESS NOTES
Patient's wife Cony Leiva called for updates, no answer.      Electronically signed by Toi Zhong RN on 10/2/2020 at 8:11 AM

## 2020-10-02 NOTE — PROGRESS NOTES
RESPIRATORY THERAPY ASSESSMENT    Name:  [address] [place] [address] Record Number:  [id number]  Age: 48 y.o. Gender: male  : 1970  Today's Date:  10/2/2020  Room:  19/0111-66    Assessment     Is the patient being admitted for a COPD or Asthma exacerbation? No   (If yes the patient will be seen every 4 hours for the first 24 hours and then reassessed)    Patient Admission Diagnosis      Allergies  Allergies   Allergen Reactions    Amoxicillin        Minimum Predicted Vital Capacity:    1006          Actual Vital Capacity:      1000              Pulmonary History: Lung Ca  Home Oxygen Therapy:  room air  Home Respiratory Therapy:None   Current Respiratory Therapy:  MDI Albuterol Qid  Treatment Type: MDI  Medications: Albuterol    Respiratory Severity Index(RSI)   Patients with orders for inhalation medications, oxygen, or any therapeutic treatment modality will be placed on Respiratory Protocol. They will be assessed with the first treatment and at least every 72 hours thereafter. The following severity scale will be used to determine frequency of treatment intervention. Smoking History: No Smoking History = 0    Social History  Social History     Tobacco Use    Smoking status: Never Smoker    Smokeless tobacco: Never Used   Substance Use Topics    Alcohol use:  Yes    Drug use: No       Recent Surgical History: None = 0  Past Surgical History  Past Surgical History:   Procedure Laterality Date    IR PORT PLACEMENT EQUAL OR GREATER THAN 5 YEARS  2020    IR PORT PLACEMENT EQUAL OR GREATER THAN 5 YEARS 2020 [facility] PROCEDURES       Level of Consciousness: Alert, Oriented, and Cooperative = 0    Level of Activity: Walking unassisted = 0    Respiratory Pattern: Regular Pattern; RR 8-20 = 0    Breath Sounds: Diminshed bilaterally and/or crackles = 2    Sputum   ,  , Sputum How Obtained: None  Cough: Strong, spontaneous, non-productive = 0    Vital Signs   BP (!) 142/76   Pulse 111   Temp 97.5 °F (36.4 °C) (Oral)   Resp 20   Ht 5' 7\" (1.702 m)   Wt 253 lb 4.8 oz (114.9 kg)   SpO2 95%   BMI 39.67 kg/m²   SPO2 (COPD values may differ): Less than 86% on room air or greater than 92% on FiO2 greater than 50% = 4    Peak Flow (asthma only): not applicable = 0    RSI: 65-35 = Q4WA (every four hours while awake) and Q4hrs PRN        Plan       Goals: medication delivery, mobilize retained secretions, volume expansion and improve oxygenation    Patient/caregiver was educated on the proper method of use for Respiratory Care Devices:  Yes      Level of patient/caregiver understanding able to:   ? Verbalize understanding   ? Demonstrate understanding       ? Teach back        ? Needs reinforcement       ? No available caregiver               ? Other:     Response to education:  Very Good     Is patient being placed on Home Treatment Regimen? No     Does the patient have everything they need prior to discharge? NA     Comments: chart reviewed    Plan of Care: continue current therapy    Electronically signed by Faustina Gibbons RCP on 10/2/2020 at 3:03 PM    Respiratory Protocol Guidelines     1. Assessment and treatment by Respiratory Therapy will be initiated for medication and therapeutic interventions upon initiation of aerosolized medication. 2. Physician will be contacted for respiratory rate (RR) greater than 35 breaths per minute. Therapy will be held for heart rate (HR) greater than 140 beats per minute, pending direction from physician. 3. Bronchodilators will be administered via Metered Dose Inhaler (MDI) with spacer when the following criteria are met:  a. Alert and cooperative     b. HR < 140 bpm  c. RR < 30 bpm                d. Can demonstrate a 2-3 second inspiratory hold  4. Bronchodilators will be administered via Hand Held Nebulizer [facility]) to patients when ANY of the following criteria are met  a. Incognizant or uncooperative          b.  Patients treated with HHN at Home c. Unable to demonstrate proper use of MDI with spacer     d. RR > 30 bpm   5. Bronchodilators will be delivered via Metered Dose Inhaler (MDI), HHN, Aerogen to intubated patients on mechanical ventilation. 6. Inhalation medication orders will be delivered and/or substituted as outlined below. Aerosolized Medications Ordering and Administration Guidelines:    1. All Medications will be ordered by a physician, and their frequency and/or modality will be adjusted as defined by the patients Respiratory Severity Index (RSI) score. 2. If the patient does not have documented COPD, consider discontinuing anticholinergics when RSI is less than 9.  3. If the bronchospasm worsens (increased RSI), then the bronchodilator frequency can be increased to a maximum of every 4 hours. If greater than every 4 hours is required, the physician will be contacted. 4. If the bronchospasm improves, the frequency of the bronchodilator can be decreased, based on the patient's RSI, but not less than home treatment regimen frequency. 5. Bronchodilator(s) will be discontinued if patient has a RSI less than 9 and has received no scheduled or as needed treatment for 72  Hrs. Patients Ordered on a Mucolytic Agent:    1. Must always be administered with a bronchodilator. 2. Discontinue if patient experiences worsened bronchospasm, or secretions have lessened to the point that the patient is able to clear them with a cough. Anti-inflammatory and Combination Medications:    1. If the patient lacks prior history of lung disease, is not using inhaled anti-inflammatory medication at home, and lacks wheezing by examination or by history for at least 24 hours, contact physician for possible discontinuation.

## 2020-10-02 NOTE — PROGRESS NOTES
Called lab inquiring about results for anti XA and sodium. They stated that they are looking into it and will return my call.  Electronically signed by Rolando Marquez RN on 10/2/2020 at 4:37 PM

## 2020-10-02 NOTE — CONSULTS
[facility] NEPHROLOGY    [facility]              [phone]                      Mr. Clemente Bruno is admitted with multivessel pulmonary embolisms. We are following for hyponatremia. Interval History and plan:      - Lasix 40 mg IV BID  - Na q4h  - Urine osmolality, Na, Cr, uric acid  - repeat BNP                     Assessment :     Hyponatremia  Unclear, complex case. Likely due to decrease poor oral intake, liver disease or congestive heart failure. Less likely SIADH due to decreased urine sodium. Urine osmolality of 806 suggestive of decreased renal perfusion due to decreased intravascular volume from hepatorenal or cardiorenal source. Shortness of breath can be contributed to hyponatremia or 2/2 adenocarcinoma, PE or stress on heart. [facility] Nephrology would like to thank Reuben Laurent MD   for opportunity to serve this patient      Please call with questions at-   24 Hrs Answering service [phone] or  7 am- 5 pm via Perfect serve or cell phone        CC/reason for consult :     hyponatremia     HPI :     Clemente Bruno is a 48 y.o. male presented to   the hospital on 9/28/2020 with dyspnea for five days. Patient states his dyspnea has been progressively getting worse. His symptoms are exacerbated on exertion. He states he has ran out of his home Eliquis one week ago and has not been taking it. Simultaneously, his use of his CPAP more frequently during the day, which he only has been using at night. CT imaging revealed multivessel pulmonary embolisms, ascites and ground glass opacities in his EVER, RUL, LLL. He underwent EKOS which made him feel somewhat better. Patient's lab workup shown he was hyponatremia, for which nephrology was consulted.       ROS:     Seen with-     positives in bold   Constitutional:  fever, chills, weakness, weight change, fatigue  Skin:  rash, pruritus, hair loss, bruising, dry skin, petechiae  Head, Face, Neck   headaches, swelling,  cervical adenopathy  Respiratory: shortness of breath, cough, or wheezing  Cardiovascular: chest pain, palpitations, dizzy, edema  Gastrointestinal: nausea, vomiting, diarrhea, constipation,belly pain    Yellow skin, blood in stool  Musculoskeletal:  back pain, muscle weakness, gait problems,       joint pain or swelling. Genitourinary:  dysuria, poor urine flow, flank pain, blood in urine  Neurologic:  vertigo, TIA'S, syncope, seizures, focal weakness  Psychosocial:  insomnia, anxiety, or depression. Additional positive findings:                        All other remaining systems are negative or unable to obtain        PMH/PSH/SH/Family History:     Past Medical History:   Diagnosis Date    Cancer [facility])     lung  cancer 2020    Chicken pox     DVT, bilateral lower limbs (HCC)     Lung cancer ([facility] [address]        Past Surgical History:   Procedure Laterality Date    IR PORT PLACEMENT EQUAL OR GREATER THAN 5 YEARS  8/25/2020    IR PORT PLACEMENT EQUAL OR GREATER THAN 5 YEARS 8/25/2020 [facility] SPECIAL PROCEDURES        reports that he has never smoked. He has never used smokeless tobacco. He reports current alcohol use. He reports that he does not use drugs. family history includes Cancer in his father; High Blood Pressure in his father; High Cholesterol in his mother.          Medication:     Current Facility-Administered Medications: melatonin tablet 6 mg, 6 mg, Oral, Nightly PRN  guaiFENesin-dextromethorphan (ROBITUSSIN DM) 100-10 MG/5ML syrup 5 mL, 5 mL, Oral, Q4H PRN  heparin (porcine) injection 8,530 Units, 80 Units/kg, Intravenous, PRN  heparin (porcine) injection 4,260 Units, 40 Units/kg, Intravenous, PRN  heparin 25,000 units in dextrose 5% 250 mL infusion, 13 Units/kg/hr, Intravenous, Continuous  albuterol sulfate  (90 Base) MCG/ACT inhaler 2 puff, 2 puff, Inhalation, 4x daily  acetaminophen (TYLENOL) tablet 650 mg, 650 mg, Oral, Q6H PRN **OR** acetaminophen (TYLENOL) suppository 650 mg, 650 mg, Rectal, Q6H PRN  polyethylene glycol (GLYCOLAX) packet 17 g, 17 g, Oral, Daily PRN  promethazine (PHENERGAN) tablet 12.5 mg, 12.5 mg, Oral, Q6H PRN **OR** ondansetron (ZOFRAN) injection 4 mg, 4 mg, Intravenous, Q6H PRN  perflutren lipid microspheres (DEFINITY) injection 1.65 mg, 1.5 mL, Intravenous, ONCE PRN  meropenem (MERREM) 1 g in sodium chloride 0.9 % 100 mL IVPB (mini-bag), 1 g, Intravenous, Q8H  sodium chloride flush 0.9 % injection 10 mL, 10 mL, Intravenous, 2 times per day  sodium chloride flush 0.9 % injection 10 mL, 10 mL, Intravenous, PRN       Vitals :     Vitals:    10/02/20 0830   BP:    Pulse:    Resp:    Temp: 98 °F (36.7 °C)   SpO2:        I & O :       Intake/Output Summary (Last 24 hours) at 10/2/2020 1023  Last data filed at 10/2/2020 1687  Gross per 24 hour   Intake 1760.37 ml   Output 1325 ml   Net 435.37 ml        Physical Examination :     General appearance: Anxious- no, distressed- no, in good spirits- yes  HEENT: Lips- normal, teeth- ok , oral mucosa- moist  Neck : Mass- no, appears symmetrical, JVD- not visible  Respiratory: Respiratory effort-  normal, wheeze- no, crackles - none, decreased breath sounds bilateral bases  Cardiovascular:  Ausculation- No M/R/G, Edema bilateral LE  Abdomen: visible mass- no, distention- no, scar- no, tenderness- no                            hepatosplenomegaly-  no  Musculoskeletal:  clubbing no,cyanosis- no , digital ischemia- no                           muscle strength- grossly normal , tone - grossly normal  Skin: rashes- no , ulcers- no, induration- no, tightening - no  Psychiatric:  Judgement and insight- normal           AAO X 3  Additional finding:      LABS:     Recent Labs     09/30/20  0548 10/01/20  0420 10/02/20  0520   WBC 24.2* 25.8* 27.2*   HGB 13.9 14.2 14.1   HCT 41.6 42.2 42.2   * 109* 73*     Recent Labs     09/30/20  0548 10/01/20  0420 10/02/20  0629   * 126* 120*   K 4.6 4.8 4.8   CL 91* 92* 87*   CO2 18* 20* 19*   BUN 37* 41* 52*

## 2020-10-02 NOTE — PROGRESS NOTES
1334 pt's anti XA resulted at 0.05. This RN spoke with Rambo Rosales, pharmacist, who agreed that anti XA should be redrawn as this result may not be accurate given pt's critically high APTT this morning. Pt's IV assessed by this RN to ensure it is infusing properly. IV flushed with normal saline without resistance, leaking, or pain at site. No infiltration or other complications noted. Small amount of old bloody drainage noted at IV insertion site, unchanged since shift change. Lab notified that anti XA needs to be redrawn (pt made a lab draw as this RN and charge RN were unable to obtain peripheral blood sampling this morning). Will monitor and will adjust heparin gtt based on redrawn anti XA.      Electronically signed by Linda Hickman RN on 10/2/2020 at 2:49 PM

## 2020-10-02 NOTE — PROGRESS NOTES
[address] [place] [address]  GI Progress Note        Isra Davis is a 48 y.o. male patient. 1. Acute saddle pulmonary embolism with acute cor pulmonale (HCC)    2. Acute respiratory failure with hypoxia (HCC)    3. Other ascites        Admit Date: 9/28/2020    Subjective:       Patient is still dyspneic, which gets worse throughout the day and is exacerbated by talking. He denies N/V, CP, F/C, or abdominal pain. BMs have been normal. He continues to have LE edema bilaterally.     ROS:  Cardiovascular ROS: negative for chest pain, positive for tachycardia  Gastrointestinal ROS: no abdominal pain, change in bowel habits, or black or bloody stools  Respiratory ROS: positive for dyspnea    Scheduled Meds:   albuterol sulfate HFA  2 puff Inhalation 4x daily    meropenem  1 g Intravenous Q8H    sodium chloride flush  10 mL Intravenous 2 times per day       Continuous Infusions:   heparin (PORCINE) Infusion 16 Units/kg/hr (10/02/20 0705)       PRN Meds:  melatonin, guaiFENesin-dextromethorphan, heparin (porcine), heparin (porcine), acetaminophen **OR** acetaminophen, polyethylene glycol, promethazine **OR** ondansetron, perflutren lipid microspheres, sodium chloride flush      Objective:       Patient Vitals for the past 24 hrs:   BP Temp Temp src Pulse Resp SpO2 Weight   10/02/20 0508 115/76 98.3 °F (36.8 °C) Oral 106 20 95 % 253 lb 4.8 oz (114.9 kg)   10/02/20 0446 -- -- -- -- 18 95 % --   10/02/20 0055 -- -- -- -- 20 92 % --   10/01/20 2344 116/76 97.9 °F (36.6 °C) Oral 108 20 92 % --   10/01/20 2145 -- -- -- -- -- 93 % --   10/01/20 2006 116/75 97.8 °F (36.6 °C) Oral 106 22 94 % --   10/01/20 1538 117/81 98.4 °F (36.9 °C) Oral 104 20 95 % --   10/01/20 1243 -- -- -- -- 20 90 % --   10/01/20 1115 126/80 98.1 °F (36.7 °C) Oral 107 24 92 % --   10/01/20 0907 -- -- -- -- 20 94 % --   10/01/20 0801 131/79 97.7 °F (36.5 °C) Oral 103 22 93 % --       Exam:  VITALS:  /76   Pulse 106   Temp 98.3 °F (36.8 °C) (Oral) Resp 20   Ht 5' 7\" (1.702 m)   Wt 253 lb 4.8 oz (114.9 kg)   SpO2 95%   BMI 39.67 kg/m²   TEMPERATURE:  Current - Temp: 98.3 °F (36.8 °C); Max - Temp  Av °F (36.7 °C)  Min: 97.7 °F (36.5 °C)  Max: 98.4 °F (36.9 °C)    NAD  General appearance: alert, appears stated age, cooperative and no distress  Head: Normocephalic, without obvious abnormality, atraumatic  Neck: supple, symmetrical, trachea midline and thyroid not enlarged, symmetric, no tenderness/mass/nodules  CVS:  RRR, Nl s1s2  Lungs Decreased breath sounds of bilateral bases  Abdomen: abnormal findings:  distended, shifting dullness. Nontender. AAOx3, No asterixis or encephalopathy  Extremities: Edema of BL LE's      Recent Labs     20  0548 10/01/20  0420 10/02/20  0520   WBC 24.2* 25.8* 27.2*   HGB 13.9 14.2 14.1   HCT 41.6 42.2 42.2   MCV 84.7 84.7 84.7   * 109* 73*     Recent Labs     20  0548 10/01/20  0420 10/02/20  0629   * 126* 120*   K 4.6 4.8 4.8   CL 91* 92* 87*   CO2 18* 20* 19*   BUN 37* 41* 52*   CREATININE 0.9 1.2 1.3     Recent Labs     20  1229 20  0548 10/01/20  0420   * 213* 206*   * 117* 113*   BILIDIR 4.6* 4.4* 5.3*   BILITOT 5.3* 5.3* 6.1*   ALKPHOS 780* 770* 780*     No results for input(s): LIPASE, AMYLASE in the last 72 hours. Recent Labs     20  1229 20  0548 20  1006 10/01/20  0420   PROT 6.1* 5.9* 5.8* 5.9*   INR 1.56*  --   --   --          Assessment:       Active Problems:    Pulmonary embolism, bilateral (HCC)  Resolved Problems:    * No resolved hospital problems. *    1) New-onset ascites- likely 2/2 portal HTN   10/1 Paracentesis SAAG 1.7 (portal HTN is likely cause with 97% accuracy), fluid protein 1.3 consistent with hepatic origin of transudate not cardiac  2) Transaminitis with hyperbilirubinemia- assess for underlying liver disease, drug-induced hepatotoxicity.  Negative hepatitis panel, negative anti-nuclear ab, negative anti-mitochondrial Ab  3) Elevated ALP - Biliary obstruction vs PBC    Recommendations:       - Awaiting abdominal U/S with doppler of portal hepatic veins to assess for portal vein thrombosis  - Consider aldactone and lasix if renal function can tolerate  - Low sodium diet/fluid restriction  - Serial liver chem   - Repeat therapeutic large-volume paracentesis with IV albumin 8 grams per liter removed       Thank you for the opportunity to participate in [facility].       This patient was discussed with Cherrie Simental MD.  ---------------------------  Araseli Murillo MD  PGY-1  10/2/2020  7:29 AM

## 2020-10-02 NOTE — PROGRESS NOTES
Echocardiogram currently being done at bedside.  Electronically signed by Donna Islas RN on 10/2/2020 at 3:21 PM

## 2020-10-02 NOTE — PLAN OF CARE
Problem: Falls - Risk of:  Goal: Will remain free from falls  Outcome: Ongoing  Note: Patient remains free of falls and accidental injury. Patient assessed as a medium fall risk. Bed in lowest position, non-skid footwear in place, call light in reach, instructed patients to call for needs or assistance out of bed. Will continue to monitor. Problem: Respiratory:  Goal: Ability to maintain adequate ventilation will improve  Outcome: Ongoing  Note: Patient on 7-8 L of high flow nasal cannula this shift. SpO2 92-95%. Lung fields clear. Problem: Bleeding:  Goal: Will show no signs and symptoms of excessive bleeding  Outcome: Ongoing  Note: Patient therapeutic on heparin gtt. Thrombocytopenia present, HIT lab drawn on dayshift. No signs of bleeding. Problem: Isolation Precautions - Risk of Spread of Infection  Goal: Prevent transmission of infection  Outcome: Ongoing  Covid test still pending.

## 2020-10-02 NOTE — PROGRESS NOTES
Clinical Pharmacy Progress Note    Patient is on Heparin high dose weight based infusion, which is being monitored & adjusted using aPTT as pt received an oral Factor-Xa inhibitor within 72 hrs of starting heparin infusion, which interacts with Anti-Xa monitoring of heparin. It has now been 72 hours since heparin infusion was initiated, and the oral Factor-Xa inhibitor interaction with Anti-Xa levels is eliminated. Heparin infusion will now be monitored & adjusted using Anti-Xa levels per the usual [facility] protocol.     Anti-Xa algorithm:  **Use original weight used to start heparin for all adjustments**  Maximum initial infusion rate = 2100 units/hr  AntiXa < 0.10 International Units/mL   Bolus = 80 units/kg       (Maximum bolus = 10,000 units)                      Increase infusion by 4 units/kg/hr  0.1-0.29 International Units/mL   Bolus = 40 units/kg    (Maximum bolus = 5,000 units)                      Increase infusion by 2 units/kg/hr  0.3-0.7 International Units/mL   No bolus                                                          No change  0.71-0.99 International Units/mL   No bolus                       Decrease infusion by 2 units/kg/hr  1.0 International Units/mL or greater  Hold heparin for 1 hour                             Decrease infusion by 3 units/kg/hr    Anti Xa levels 6 hours after any rate change  When 2 successive Anti Xa's are at goal   monitor Anti Xa level at least daily      Please call pharmacy with any questions -  Myra Bae PharmD, BCPS  Wireless: [phone]  or [phone]  10/2/2020 1:05 PM

## 2020-10-02 NOTE — PROGRESS NOTES
% injection 10 mL, 10 mL, Intravenous, 2 times per day  sodium chloride flush 0.9 % injection 10 mL, 10 mL, Intravenous, PRN    Allergies  Allergies   Allergen Reactions    Amoxicillin        Physical Exam  VITALS:  /76   Pulse 106   Temp 98.3 °F (36.8 °C) (Oral)   Resp 20   Ht 5' 7\" (1.702 m)   Wt 253 lb 4.8 oz (114.9 kg)   SpO2 95%   BMI 39.67 kg/m²   TEMPERATURE:  Current - Temp: 98.3 °F (36.8 °C); Max - Temp  Av °F (36.7 °C)  Min: 97.7 °F (36.5 °C)  Max: 98.4 °F (36.9 °C)  PULSE OXIMETRY RANGE: SpO2  Av.2 %  Min: 90 %  Max: 95 %  24HR INTAKE/OUTPUT:      Intake/Output Summary (Last 24 hours) at 10/2/2020 0646  Last data filed at 10/2/2020 7280  Gross per 24 hour   Intake 1760.37 ml   Output 1125 ml   Net 635.37 ml       Physical Exam  Because the patient is in COVID-19 isolation, he was not seen in person in an effort to minimize risk of transmission and to reduce the use of personal protective equipment. Labs  Recent Labs     20  0548 10/01/20  0420 10/02/20  05   WBC 24.2* 25.8* 27.2*   HGB 13.9 14.2 14.1   HCT 41.6 42.2 42.2   * 109* 73*   MCV 84.7 84.7 84.7       Recent Labs     20  0548 10/01/20  042   * 126*   K 4.6 4.8   CL 91* 92*   CO2 18* 20*   BUN 37* 41*   CREATININE 0.9 1.2       Recent Labs     20  1229 20  0548 10/01/20  0420   * 213* 206*   * 117* 113*   BILIDIR 4.6* 4.4* 5.3*   BILITOT 5.3* 5.3* 6.1*   ALKPHOS 780* 770* 780*       No results for input(s): MG in the last 72 hours. Radiology  Xr Chest Portable    Result Date: 10/1/2020  XR CHEST PORTABLE Indication: Pulmonary embolus, Lung Ca, Possible PNA COMPARISON: 2020 Findings: Portable AP upright view of the chest was obtained. The heart is stable in size and configuration. Right lower lobe pulmonary mass lesion is again identified. Diffuse reticular opacities are again noted bilaterally, right greater than left.  There is no new consolidative opacity. No pneumothorax or effusion. Prominence of the right paratracheal stripe is again noted indicating lymphadenopathy. Impression: Findings similar to the prior study with right lower lobe pulmonary mass lesion, diffuse reticular opacities and mediastinal lymphadenopathy. Xr Chest Portable    Result Date: 9/28/2020  Chest portable. HISTORY: Shortness of breath. COMPARISON: CT PET August 17, 2020 Heart size is normal Right lower lobe pulmonary parenchymal mass measures 4 cm present previously most consistent with neoplasm. Marked diffuse reticulonodular opacities within the right lung involving the right upper lobe, right middle lobe and right lower lobe may relate to pneumonitis or edema or lymphangitic spread of tumor. No lobar consolidation. Findings unchanged. Marked right paratracheal and subcarinal lymphadenopathy and right hilar lymphadenopathy most consistent with lymph node metastasis. Right lower lung intrapulmonary mass most consistent with neoplasm. Mediastinal and hilar lymphadenopathy likely relates to metastasis. Reticular nodular opacities noted diffusely in the right lung indeterminate. Lymphangitic spread of tumor is not excluded. Cta Chest W Contrast    Result Date: 9/28/2020  EXAM: CT CHEST WITH CONTRAST. CT pulmonary angiogram. INDICATION: Shortness of breath. COMPARISON: Chest portable September 28, 2020. CT chest August 3, 2020 TECHNIQUE: Axial CT imaging of the chest was performed. Axial images, multiplanar reformatted images and axial maximum intensity projection were reviewed. Individualized dose optimization technique was used in order to meet ALARA standards for radiation dose reduction.   In addition to vendor specific dose reduction algorithms, the dose reduction techniques vary based on the specific scanner utilized but frequently include automated exposure control, adjustment of the mA and/or kV according to patient size, and use of iterative reconstruction technique. Multiplanar reconstructed images and MIP images for CT angiogram. CONTRAST: 80 mL Isovue-[id number] FINDINGS: LUNGS AND AIRWAYS: Bronchial wall thickening involving the lower lobe bronchi. Marked reticulation diffusely within the right lung within the right upper lobe, right middle lobe and right lower lobe which may relate to lymphangitic spread of tumor. Marked groundglass opacity left lower lobe and left upper lobe less pronounced right upper lobe may relate to atelectasis or pneumonitis or acute atypical inflammatory infectious etiology. Enhancing mass within the right lower lobe measures 5 x 5 cm consistent with patient's primary neoplasm present previously. PLEURA: Tiny right pleural effusion measures 10 mm in thickness HEART / GREAT VESSELS: The thoracic aorta is normal. No aneurysm. No dissection. Acute pulmonary embolism involving the right and left main pulmonary arteries and the lobar branches of the pulmonary arteries supplying the right upper lobe, right middle lobe, right lower lobe, left upper lobe, lingula and left lower lobe as well as the segmental vessels supplying the lungs diffusely. There is evidence for right heart strain. . Small pericardial effusion. ADENOPATHY/MEDIASTINUM: Right supraclavicular, right paratracheal, precarinal, subcarinal, right hilar and AP window lymphadenopathy present previously. CHEST WALL / LOWER NECK: Right supraclavicular lymphadenopathy present previously UPPER ABDOMEN: Limited evaluation due to timing of imaging. Moderate volume of ascites noted. BONES: No acute abnormality. . Atrophy involving the musculature of the shoulder girdle on the right. 1. Acute pulmonary emboli involving the right and left main pulmonary arteries and the lobar and segmental branches supplying the right upper lobe, right middle lobe, right lower lobe, lingula, left upper lobe and left lower lobe. . Right heart strain. Right lower lung mass consistent with neoplasm.  Diffuse reticulation and reticular nodular opacities right lung likely relates to lymphangitic spread of tumor. Bronchial thickening likely relates to peribronchial spread of tumor. Groundglass opacities left upper lobe right upper lobe and left lower lobe indeterminate. Findings may relate to atelectasis pneumonitis or inflammatory /infectious process. Marked lymphadenopathy involving the right hilum ,mediastinum and right supraclavicular region. Large volume of ascites. Findings called as a critical result to the emergency room by Dr. Benito Hilliard at the time of dictation 9/28/2020 5:00 PM    Ir Thromb Vein Infuse Init Tx Day    Result Date: 9/28/2020  BILATERAL PULMONARY ARTERY CATHETER PLACEMENT WITH PULMONARY ANGIOGRAM FOR MECHANICAL AND CHEMICAL THROMBOLYSIS INDICATION: Bilateral acute pulmonary emboli. : Luis Carlos Winston M.D. EBL: < 10 mL Following a discussion of the risks, benefits and alternatives to the procedure informed consent was obtained. The patient received intravenous conscious sedation administered by a dedicated radiology nurse with monitoring of vital signs and oxygenation during the procedure. Conscious sedation was administered for approximately 60 minutes. The right neck was prepared and draped using sterile technique. Following administration of local anesthetic a 21-gauge needle was advanced under ultrasound guidance into the right internal jugular vein. Jugular venous patency was confirmed using physical compression and grayscale imaging. A 0.018 inch wire was advanced. Seldinger technique was used to exchange for a 6 [place] standard vascular sheath. This process was repeated after local anesthetic was administered for placement of a second sheath into the right internal jugular vein under ultrasound guidance. Under fluoroscopic visualization, a 5 Hungarian Cobra catheter and 0.035 inch stiff Glidewire were advanced into the left lower lobe pulmonary artery.  Seldinger exchange was performed for placement of a 6 Pakistani EKOS catheter. After some difficulty and injection of the right pulmonary artery to confirm patency, similar process was repeated for cannulation of the right lower lobe pulmonary artery with subsequent EKOS catheter placement. Catheters were connected to IV thrombolytic infusion. Fluoroscopy time 13.3 min Radiation DAP 32387 mGy cm2 Images/acquisitions 3     Successful placement of 6 Pakistani EKOS catheters into bilateral pulmonary arteries for bilateral pulmonary arterial thrombolysis. Pathology  Cytology from paracentesis performed September 30 is pending    Problem List  Patient Active Problem List   Diagnosis    Pharyngitis, acute    Conjunctivitis    Pain in soft tissues of limb    Skin disorder    Hyperlipemia    Pulmonary embolism, bilateral ([address]       Assessment and Plan:     Lung adenocarcinoma, stage IV, T2 N3 M1. Patient had begun frontline therapy with nivolumab/ipilimumab as an outpatient. This will continue once he has recovered from this hospitalization. At this point, no immune mediated adverse events are evident. Acute pulmonary embolism, With cor pulmonale. The patient was also diagnosed with a DVT at the time of his lung cancer diagnosis in August 2020. He had been started on Eliquis, but had a gap in refilling his medicine and therefore did not take it for approximately 1 week. Upon presentation to the emergency room, a CT pulmonary angiogram revealed multiple emboli involving the main pulmonary arteries as well as lobar and segmental blank branches. He underwent an EKOS procedure and remains therapeutically anticoagulated on heparin. I do not believe this represents a Eliquis failure and he can transition back to Eliquis. Ascites. Based on the SAAG of 1.7, I suspect this is the result of portal vein thrombosis. Once he has been cleared from COVID-19 isolation, he can undergo Doppler ultrasound of the portal vein to confirm.   Await cytology, although I doubt this is malignant. There could all so be some component of right heart failure due to his pulmonary emboli. Port infection. Culture grew Pseudomonas. He continues on meropenem.     Alvin Santos MD  10/2/2020

## 2020-10-02 NOTE — PROGRESS NOTES
COVID-19 negative. Droplet plus isolation discontinued per protocol.  Electronically signed by Sarai Bowers RN on 10/2/2020 at 10:36 AM

## 2020-10-02 NOTE — PROGRESS NOTES
Pulmonology Progress Note        PCP: Sarah Gomez MD    Date of Admission: 9/28/2020    Subjective: Patient was seen and examined this AM. There were no acute events overnight. Continues to complain of shortness of breath and abdominal distension. Denies fever/chills, nausea/vomiting or chest pain. COVID-19 test negative. Plan for RUQ ultrasound and echocardiogram today. Medications:  Reviewed    Infusion Medications    heparin (PORCINE) Infusion 13 Units/kg/hr (10/02/20 0839)     Scheduled Medications    albuterol sulfate HFA  2 puff Inhalation 4x daily    meropenem  1 g Intravenous Q8H    sodium chloride flush  10 mL Intravenous 2 times per day     PRN Meds: melatonin, guaiFENesin-dextromethorphan, heparin (porcine), heparin (porcine), acetaminophen **OR** acetaminophen, polyethylene glycol, promethazine **OR** ondansetron, perflutren lipid microspheres, sodium chloride flush      Intake/Output Summary (Last 24 hours) at 10/2/2020 1058  Last data filed at 10/2/2020 1050  Gross per 24 hour   Intake 2000.37 ml   Output 1325 ml   Net 675.37 ml       Physical Exam Performed:    /77   Pulse 109   Temp 98 °F (36.7 °C) (Temporal)   Resp 26   Ht 5' 7\" (1.702 m)   Wt 253 lb 4.8 oz (114.9 kg)   SpO2 91%   BMI 39.67 kg/m²     General appearance: No apparent distress, appears stated age and cooperative. HEENT: Pupils equal, round, and reactive to light. Conjunctivae/corneas clear. Neck: Supple, with full range of motion. No jugular venous distention. Trachea midline. Respiratory: Reduced breath sounds bilaterally   Cardiovascular: Regular rate and rhythm with normal S1/S2 without murmurs, rubs or gallops. Abdomen: Distension appreciated   Musculoskeletal: No clubbing, cyanosis or edema bilaterally. Full range of motion without deformity. Skin: Skin color, texture, turgor normal.  No rashes or lesions. Neurologic:  Neurovascularly intact without any focal sensory/motor deficits.  Cranial nerves: II-XII intact, grossly non-focal.  Psychiatric: Alert and oriented, thought content appropriate, normal insight  Capillary Refill: Brisk,< 3 seconds   Peripheral Pulses: +2 palpable, equal bilaterally       Labs:   Recent Labs     09/30/20  0548 10/01/20  0420 10/02/20  0520   WBC 24.2* 25.8* 27.2*   HGB 13.9 14.2 14.1   HCT 41.6 42.2 42.2   * 109* 73*     Recent Labs     09/30/20  0548 10/01/20  0420 10/02/20  0629   * 126* 120*   K 4.6 4.8 4.8   CL 91* 92* 87*   CO2 18* 20* 19*   BUN 37* 41* 52*   CREATININE 0.9 1.2 1.3   CALCIUM 7.8* 8.2* 8.2*     Recent Labs     09/30/20  0548 10/01/20  0420 10/02/20  0629   * 206* 210*   * 113* 108*   BILIDIR 4.4* 5.3* 6.2*   BILITOT 5.3* 6.1* 7.3*   ALKPHOS 770* 780* 747*     Recent Labs     09/29/20  1229   INR 1.56*     No results for input(s): CKTOTAL, TROPONINI in the last 72 hours. Urinalysis:    No results found for: [place], BACTERIA, RBCUA, BLOODU, [address], [address]    Radiology:  XR CHEST PORTABLE   Final Result   Impression: Findings similar to the prior study with right lower lobe pulmonary mass lesion, diffuse reticular opacities and mediastinal lymphadenopathy. IR THROMB VEIN INFUSE INIT TX DAY   Final Result      Successful placement of 6 [place] EKOS catheters into bilateral pulmonary arteries for bilateral pulmonary arterial thrombolysis. CTA CHEST W CONTRAST   Final Result      1. Acute pulmonary emboli involving the right and left main pulmonary arteries and the lobar and segmental branches supplying the right upper lobe, right middle lobe, right lower lobe, lingula, left upper lobe and left lower lobe. . Right heart strain. Right lower lung mass consistent with neoplasm. Diffuse reticulation and reticular nodular opacities right lung likely relates to lymphangitic spread of tumor. Bronchial thickening likely relates to peribronchial spread of tumor.       Groundglass opacities left upper lobe right upper lobe and left lower lobe indeterminate. Findings may relate to atelectasis pneumonitis or inflammatory /infectious process. Marked lymphadenopathy involving the right hilum ,mediastinum and right supraclavicular region. Large volume of ascites. Findings called as a critical result to the emergency room by Dr. Benito Hilliard at the time of dictation 9/28/2020 5:00 PM      XR CHEST PORTABLE   Final Result      Right lower lung intrapulmonary mass most consistent with neoplasm. Mediastinal and hilar lymphadenopathy likely relates to metastasis. Reticular nodular opacities noted diffusely in the right lung indeterminate. Lymphangitic spread of tumor is not excluded. US ABDOMEN LIMITED    (Results Pending)   US DUP ABD PEL RETRO SCROT COMPLETE    (Results Pending)         Assessment/Plan:  Martine Maya is a 48 y.o. male, who was admitted for multivessel PE w/EKOS treatment. Acute hypoxic respiratory failure 2/2 volume overload  -Recommend starting Lasix and aldactone   -S/P diagnostic and therapeutic paracentesis   -Echocardiogram pending   -RUQ ultrasound pending   -S/P EKOS   -Recommend transitioning heparin drip to DOAC    -Continue supplemental oxygen (currently on 6 L HFNC)     Submassive PE s/p EKOS   -Transition heparin drip to DOAC   -Echocardiogram to evaluate for right heart strain     Ascites   -Likely cardiac in origin   -Agree with echo and liver US   -Patient will need paracentesis   -Recommend starting diuretics as above       I will discuss the patient with attending physician, Dr. Hayley Loyola MD.     Troy Benavidez MD.   Internal Medicine Resident PGY-3  [employer]    Patient was seen, examined and discussed with Dr. Junito Howell. I agree with the interval history.  My physical exam confirms the findings listed below  Chart was reviewed including labs and medical records confirm the findings noted    Pulmonary embolism.  Submassive based on the presence of right heart failure. Recent hx of DVT - he stopped Eliquis   Hypoxia - still on 6-7 liters of O2. Echo reviewed. It showed hypokinetic right ventricle and possible clot in the right ventricular wall. Metastatic lung cancer   Ascites: paracentesis on 9/30. Albumin in the fluid is 0.7 with SAAG > 1.1. Echo and US are pending    Lactic acidemia with normal BP. This is likely due to tumor burden which is poor prognostic sign.       Continue heparin drip for now  Discussed with IR Dr. Anjum Joe for mechanical thrombectomy. Plan to get new CTA chest PE protocol with delayed abdomen/pelvis CT to assess clot burden to consider thrombectomy.     Discussed with Oncology

## 2020-10-02 NOTE — PROGRESS NOTES
8246 Lab called with sodium of 119 on labs, rejected specimen and stat redraw was ordered and walked to lab by this RN at 06:20.      Electronically signed by Diaz Shetty RN on 10/2/2020 at 7:57 AM

## 2020-10-02 NOTE — CONSULTS
Infectious Diseases   Consult Note        Admission Date: 9/28/2020  Hospital Day: Hospital Day: 5   Attending: Nicole Ruiz MD  Date of service: 10/2/20     Reason for admission: Pulmonary embolism, bilateral [facility]) [I26.99]  Pulmonary [facility], [facility] ([facility] [I26.99]    Chief complaint/ Reason for consult: Concern for port infection with Pseudomonas    Microbiology:        I have reviewed allavailable micro lab data and cultures    · Blood culture (2/2) - collected on 9/28/2020: Negative  · Nasal MRSA screen- collectedon 9/29/2020: Negative  · Ascitic fluid culture  - collected on 9/30/2020: In process  · Port tip culture: Collected on 9/21/2020: Pseudomonas    Pseudomonas aeruginosa (1)     Antibiotic  Interpretation  ALEXANDER  Status     cefepime  Sensitive  <=2  mcg/mL      ciprofloxacin  Sensitive  <=1  mcg/mL      gentamicin  Sensitive  <=4  mcg/mL      meropenem  Sensitive  <=1  mcg/mL      piperacillin-tazobactam  Sensitive  <=16  mcg/mL      tobramycin  Sensitive  <=4  mcg/mL          Antibiotics and immunizations:       Current antibiotics: All antibiotics and their doses were reviewed by me    Recent Abx Admin                   meropenem (MERREM) 1 g in sodium chloride 0.9 % 100 mL IVPB (mini-bag) (g) 1 g New Bag 10/02/20 1600     1 g New Bag  0822     1 g New Bag 10/01/20 2346                  Immunization History: All immunization history was reviewed by me today. Immunization History   Administered Date(s) Administered    Influenza Virus Vaccine 11/07/2019       Known drug allergies: All allergies were reviewed and updated    Allergies   Allergen Reactions    Amoxicillin        Social history:     Social History:  All social andepidemiologic history was reviewed and updated by me today as needed. · Tobacco use:   reports that he has never smoked. He has never used smokeless tobacco.  · Alcohol use:   reports current alcohol use.   · Currently lives in: [place] [id number]  ·  reports no history of drug use. Assessment:     The patient is a 48 y.o. old male who  has a past medical history of Cancer ([address] Chicken pox, DVT, bilateral lower limbs ([address] and Lung cancer ([facility] with following problems:    · Metastatic adenocarcinoma of the lung  · Mediastinal and hilar lymphadenopathy, likely related to metastasis  · Acute respiratory failure with hypoxia due to volume overload  · Pulmonary embolism  · Ascites, status post paracentesis  · History of DVT, had stopped Eliquis  · Obesity Class 2 due to excess calorie intake : Body mass index is 39.67 kg/m². ·         Discussion:      The patient was admitted with shortness of breath. Had a white cell count of 23,000 on admission but otherwise has been afebrile. Has been on empiric IV meropenem. CT angiogram of the chest from 9/28/2020 showed acute pulmonary embolism with right heart strain and patient received thrombolysis through pulmonary artery EKOS catheter placement on 9/28/2020. COVID 19 PCR test was done on 9/29/20 was negative. Although he did have a Port-A-Cath infection with pan susceptible Pseudomonas, the port was removed on 9/21/2020 by interventional radiology. His blood cultures from this admission have been negative. Plan:     Diagnostic Workup:    · Agree with blood cultures, negative so far  · Follow-up on repeat CT angiogram of the chest  · Continue to follow fever curve, WBC count and blood cultures  · Follow up on liverand renal functions closely    Antimicrobials:    · Continue high flow oxygen support to maintain oxygen saturation above 92%  · Cough and deep breathing exercises  · Aspiration precautions  · Shortness of breath and respiratory failure secondary to pulmonary embolism in setting of metastatic pulmonary adenocarcinoma  · Suspicion for infectious process is low  · Leukocytosis likely reactive  · Patient on IV meropenem. Low suspicion for bacterial peritonitis.   Will favor stopping empiric antibiotic by Monday, if ascitic fluid culture remains negative  · Heparin GTT per primary and pulmonary  · Fall precautions  · Discussed the above plan with patient and RN     I/v access Management:    · Continue to monitor i.v access sites for erythema, induration, discharge or tenderness. · As always, continue efforts to minimizetubes/lines/drains as clinically appropriate to reduce chances of line associated infections. Current isolation precautions: There are no current isolations documented for this patient. Level of complexity of consult: High     Risk of Complications/Morbidity: High       Thank you for involving me in the care of your patient. I will continue to follow. If you have any additional questions, please do not hesitate to contact me. Subjective:     Presenting complaint in ER:     Chief Complaint   Patient presents with    Shortness of Breath        HPI: Isra Davis is a 48 y.o. male patient, who was seen at the request of Dr. Jordy Shepherd MD.    History was obtained from chart review and the patient. The patient was admitted on 9/28/2020. I have been consulted to see the patient for above mentioned reason(s). The patient has multiple medical comorbidities, and presented to the ER originally for difficulty breathing. The patient has history of metastatic adenocarcinoma of the lungs. He was noted to have a white cell count of 23,000 on presentation to the ER. He was admitted. Blood cultures were sent. He was started empirically on IV vancomycin and IV meropenem. Nasal MRSA probe was done which was negative. Blood cultures from admission have remained negative. COVID 19 PCR test was done on 9/29/2020 which was negative as well. He recently had a port infection with the Pseudomonas. The port was removed on 9/21/2020. I have been asked for my opinion for management for this patient.                    Past Medical History: All past medical history reviewed today. Past Medical History:   Diagnosis Date    Cancer [facility])     lung  cancer 2020    Chicken pox     DVT, bilateral lower limbs (HCC)     Lung cancer [facility])          Past Surgical History: All pastsurgical history was reviewed today. Past Surgical History:   Procedure Laterality Date    IR PORT PLACEMENT EQUAL OR GREATER THAN 5 YEARS  8/25/2020    IR PORT PLACEMENT EQUAL OR GREATER THAN 5 YEARS 8/25/2020 [facility]         Family History: All family history was reviewed today. Problem Relation Age of Onset    High Cholesterol Mother     High Blood Pressure Father     Cancer Father         Prostate         Medications: All current and past medications were reviewed. Medications Prior to Admission: apixaban (ELIQUIS) 5 MG TABS tablet, Take 1 tablet by mouth 2 times daily  folic acid (FOLVITE) 1 MG tablet, Take 1 mg by mouth daily     furosemide  40 mg Intravenous BID    albuterol sulfate HFA  2 puff Inhalation 4x daily    meropenem  1 g Intravenous Q8H    sodium chloride flush  10 mL Intravenous 2 times per day          REVIEW OF SYSTEMS:       Review of Systems   Constitutional: Positive for fatigue. Negative for chills, diaphoresis and fever. HENT: Negative for ear discharge, ear pain, rhinorrhea, sore throat and trouble swallowing. Eyes: Negative for discharge and redness. Respiratory: Negative for cough, shortness of breath and wheezing. Cardiovascular: Negative for chest pain and leg swelling. Gastrointestinal: Positive for abdominal distention. Negative for abdominal pain, constipation, diarrhea and nausea. Endocrine: Negative for polyuria. Genitourinary: Negative for dysuria, flank pain, frequency, hematuria and urgency. Musculoskeletal: Negative for back pain and myalgias. Skin: Negative for rash. Neurological: Negative for dizziness, seizures and headaches. Hematological: Does not bruise/bleed easily.    Psychiatric/Behavioral: Negative for hallucinations and suicidal ideas. All other systems reviewed and are negative. Objective:       PHYSICAL EXAM:      Vitals:   Vitals:    10/02/20 1430 10/02/20 1435 10/02/20 1458 10/02/20 1558   BP:    119/80   Pulse:    108   Resp:   20 22   Temp:    98.4 °F (36.9 °C)   TempSrc:    Oral   SpO2: 90% 94% 95% 97%   Weight:       Height:           Physical Exam  Vitals signs and nursing note reviewed. Constitutional:       Appearance: Normal appearance. He is well-developed. HENT:      Head: Normocephalic and atraumatic. Right Ear: External ear normal.      Left Ear: External ear normal.      Nose: Nose normal. No congestion or rhinorrhea. Mouth/Throat:      Mouth: Mucous membranes are moist.      Pharynx: No oropharyngeal exudate or posterior oropharyngeal erythema. Eyes:      General: No scleral icterus. Right eye: No discharge. Left eye: No discharge. Conjunctiva/sclera: Conjunctivae normal.      Pupils: Pupils are equal, round, and reactive to light. Neck:      Musculoskeletal: Normal range of motion and neck supple. No neck rigidity or muscular tenderness. Cardiovascular:      Rate and Rhythm: Normal rate and regular rhythm. Pulses: Normal pulses. Heart sounds: No murmur. No friction rub. Pulmonary:      Effort: Pulmonary effort is normal. No respiratory distress. Breath sounds: Normal breath sounds. No stridor. No wheezing, rhonchi or rales. Abdominal:      General: Bowel sounds are normal.      Palpations: Abdomen is soft. Tenderness: There is no abdominal tenderness. There is no right CVA tenderness, left CVA tenderness, guarding or rebound. Musculoskeletal: Normal range of motion. General: No swelling or tenderness. Lymphadenopathy:      Cervical: No cervical adenopathy. Skin:     General: Skin is warm and dry. Coloration: Skin is not jaundiced. Findings: No erythema or rash.    Neurological:      General: No focal deficit present. Mental Status: He is alert and oriented to person, place, and time. Mental status is at baseline. Motor: No abnormal muscle tone. Psychiatric:         Mood and Affect: Mood normal.         Behavior: Behavior normal.         Thought Content: Thought content normal.           Lines: All vascular access sites are healthy with no local erythema, discharge or tenderness. Intake and output:     I/O last 3 completed shifts: In: 5042 [P.O.:1020; I.V.:698]  Out: 1075 [Urine:1075]    Lab Data:   All available labs were reviewed by me today. CBC:   Recent Labs     09/30/20  0548 10/01/20  0420 10/02/20  0520   WBC 24.2* 25.8* 27.2*   RBC 4.92 4.98 4.98   HGB 13.9 14.2 14.1   HCT 41.6 42.2 42.2   * 109* 73*   MCV 84.7 84.7 84.7   MCH 28.4 28.5 28.2   MCHC 33.5 33.6 33.3   RDW 18.6* 18.4* 18.2*        BMP:  Recent Labs     09/30/20  0548 10/01/20  0420 10/02/20  0629 10/02/20  1600   * 126* 120* 120*   K 4.6 4.8 4.8  --    CL 91* 92* 87*  --    CO2 18* 20* 19*  --    BUN 37* 41* 52*  --    CREATININE 0.9 1.2 1.3  --    CALCIUM 7.8* 8.2* 8.2*  --    GLUCOSE 120* 113* 92  --         Hepatic FunctionPanel:   Lab Results   Component Value Date    ALKPHOS 747 10/02/2020     10/02/2020     10/02/2020    PROT 6.0 10/02/2020    BILITOT 7.3 10/02/2020    BILIDIR 6.2 10/02/2020    IBILI 1.1 10/02/2020    LABALBU 2.4 10/02/2020       CPK: No results found for: CKTOTAL  ESR: No results found for: SEDRATE  CRP: No results found for: CRP      Imaging: All pertinent images and reports for the current visit were reviewed by meduring this visit. US ABDOMEN LIMITED   Final Result   1. Poor spectral waveform and color flow the main portal vein, partially occluding thrombus is not excluded. 2. Small amount of abdominal ascites. US DUP ABD PEL RETRO SCROT COMPLETE   Final Result   1.  Poor spectral waveform and color flow the main portal vein, partially occluding thrombus is not excluded. 2. Small amount of abdominal ascites. XR CHEST PORTABLE   Final Result   Impression: Findings similar to the prior study with right lower lobe pulmonary mass lesion, diffuse reticular opacities and mediastinal lymphadenopathy. IR THROMB VEIN INFUSE INIT TX DAY   Final Result      Successful placement of 6 [place] EKOS catheters into bilateral pulmonary arteries for bilateral pulmonary arterial thrombolysis. CTA CHEST W CONTRAST   Final Result      1. Acute pulmonary emboli involving the right and left main pulmonary arteries and the lobar and segmental branches supplying the right upper lobe, right middle lobe, right lower lobe, lingula, left upper lobe and left lower lobe. . Right heart strain. Right lower lung mass consistent with neoplasm. Diffuse reticulation and reticular nodular opacities right lung likely relates to lymphangitic spread of tumor. Bronchial thickening likely relates to peribronchial spread of tumor. Groundglass opacities left upper lobe right upper lobe and left lower lobe indeterminate. Findings may relate to atelectasis pneumonitis or inflammatory /infectious process. Marked lymphadenopathy involving the right hilum ,mediastinum and right supraclavicular region. Large volume of ascites. Findings called as a critical result to the emergency room by Dr. Gloria Ruggiero at the time of dictation 9/28/2020 5:00 PM      XR CHEST PORTABLE   Final Result      Right lower lung intrapulmonary mass most consistent with neoplasm. Mediastinal and hilar lymphadenopathy likely relates to metastasis. Reticular nodular opacities noted diffusely in the right lung indeterminate. Lymphangitic spread of tumor is not excluded.       CTA PULMONARY W CONTRAST    (Results Pending)   CT ABDOMEN PELVIS W IV CONTRAST Additional Contrast? None    (Results Pending)       Outside records:    Labs, Microbiology, Radiology and pertinent results from Care everywhere, if available, were reviewed as a part ofthe consultation. Problem list:       Patient Active Problem List   Diagnosis Code    Pharyngitis, acute J02.9    Conjunctivitis H10.9    Pain in soft tissues of limb M79.609    Skin disorder L98.9    Hyperlipemia E78.5    Acute saddle pulmonary embolism with acute cor pulmonale (HCC) I26.02    Acute respiratory failure with hypoxia (HCC) J96.01    Primary adenocarcinoma of lung ([facility] C34.90    Mediastinal lymphadenopathy R59.0    Other ascites R18.8    History of DVT in adulthood Z86.718         Please note that this chart was generated using Dragon dictation software. Although every effort was made to ensure the accuracy of this automated transcription, some errors in transcription may have occurred inadvertently. If you may need any clarification, please do not hesitate to contact me through EPIC or at the phone number provided below with my electronic signature. Any pictures or media included in this note were obtained after taking informed verbal consent from the patient and with their approval to include those in the patient's medical record.       Iveth Charles MD, MPH  10/2/20, 5:22 PM EDT   [facility]   [address] [place] [address]  Office: [phone]  Fax: [phone]  Clinic days:  Tuesday & Thursday

## 2020-10-02 NOTE — CARE COORDINATION
Case Management Assessment           Daily Note                 Date/ Time of Note: 10/2/2020 9:13 AM         Note completed by: Ivania Gutierrez    Patient Name: Oziel Rogers  YOB: 1970    Diagnosis:Pulmonary embolism, bilateral ([address] [I26.99]  Pulmonary embolism, bilateral ([address] [I26.99]  Patient Admission Status: Inpatient    Date of Admission:9/28/2020  3:10 PM Length of Stay: 4 GLOS:      Current Plan of Care: on 6L O2, IV abx, hep gtt  ________________________________________________________________________________________  PT AM-PAC:   / 24 per last evaluation on:     OT AM-PAC:   / 24 per last evaluation on:     DME Needs for discharge: n/a  ________________________________________________________________________________________  Discharge Plan: Home    Tentative discharge date: TBD    Current barriers to discharge: medical clearanc    Referrals completed:     Resources/ information provided: Not indicated at this time  ________________________________________________________________________________________  Case Management Notes: Patient is from home with spouse, independent pta. Out of isolation, COVID Negative, per oncology notes will have ultrasound of portal vein. Will need to restart his anticoagulants before discharge, possible home O2 eval if not off oxygen, currently uses O2 at night only at home. Corbin Ra and his family were provided with choice of provider; he and his family are in agreement with the discharge plan.     Care Transition Patient: Yes    Melissa Zimmer  Case Management Department  Ph: [phone]  Fax: [phone]

## 2020-10-02 NOTE — PROGRESS NOTES
Internal Medicine  Progress Note    Admission Date: 9/28/2020     Chief Complaint: Shortness of Breath    History of Present Illness:  Robinson Merchant is a 49 yo M w/ PMH of Stage ARNALDO adenocarcinoma of the R lung (cT2, cN2, CM1a) on carboplatin/pemetrexed c/b DVT and chronic PE on Eliquis, HTN, and HLD who presented with acute PE. On 9/28/20 he was found to have acute PE of the b/l main pulmonary arteries with right heart strain. Interval History:  He was net positive 435 mL yesterday despite receiving lasix, though BM not recorded. No acute events overnight. Temp this morning was 93.7 oral and 92.3 axillary, bear hugger was placed, and within 20 minutes temporal temp was 98.0. He denied feeling unwell or having chills this morning. Morning aPTT (139.5), hep gtt held, repeat aPTT 126, hep gtt restarted. Na down to 120. He did not sleep well overnight and believed it was due to dyspnea. O2 requirement has been generally stable in 6-8 range. Patient reports SOB and abdomen distention feels unchanged. Remains tachypnic, fatigued, tachycardic, and continues to cough. Denies any chest or abdominal pain. COVID negative.     Past Medical History:      Diagnosis Date    Cancer [facility])     lung  cancer 2020    Chicken pox     DVT, bilateral lower limbs (HCC)     Lung cancer (HCC)          Past Surgical History:      Procedure Laterality Date    IR PORT PLACEMENT EQUAL OR GREATER THAN 5 YEARS  8/25/2020    IR PORT PLACEMENT EQUAL OR GREATER THAN 5 YEARS 8/25/2020 [facility]         Medications Prior to Admission:  Medications Prior to Admission: apixaban (ELIQUIS) 5 MG TABS tablet, Take 1 tablet by mouth 2 times daily  folic acid (FOLVITE) 1 MG tablet, Take 1 mg by mouth daily      Allergies:  Amoxicillin      Family History:      Problem Relation Age of Onset    High Cholesterol Mother     High Blood Pressure Father     Cancer Father         Prostate         Social History:  Tobacco:  reports that he has never smoked. He has never used smokeless tobacco.  Alcohol:  reports current alcohol use. Review of Systems:  As above    Vitals:    10/02/20 0805 10/02/20 0830 10/02/20 1110 10/02/20 1130   BP: 112/77   (!) 142/76   Pulse: 109   111   Resp: 26   24   Temp: 93.7 °F (34.3 °C) 98 °F (36.7 °C)  97.5 °F (36.4 °C)   TempSrc: Oral Temporal  Oral   SpO2: 91%  91% 94%   Weight:       Height:         Physical Exam:    Constitutional: Awake. Well-developed and well-nourished. No acute distress. HEENT: Normocephalic and atraumatic. No scleral icterus. Cardiovascular: Regular tachycaria. Normal S1, S2. No m/r/g. Pulmonary: Mildly increased work of breathing. Still some conversational dyspnea. CTAB. SpO2 90 on 6 lpm.  Gastrointestinal: BS+. Protuberant. Soft. No tenderness. Distended. Musculoskeletal: Minimal or no peripheral edema. Skin: No cyanosis or pallor. Neurological: Alert and oriented to person, place, time, and situation. Labs:  CBC:   Recent Labs     09/30/20  0548 10/01/20  0420 10/02/20  0520   WBC 24.2* 25.8* 27.2*   HGB 13.9 14.2 14.1   HCT 41.6 42.2 42.2   * 109* 73*       BMP:   Recent Labs     09/30/20  0548 10/01/20  0420 10/02/20  0629   * 126* 120*   K 4.6 4.8 4.8   CL 91* 92* 87*   CO2 18* 20* 19*   BUN 37* 41* 52*   CREATININE 0.9 1.2 1.3   GLUCOSE 120* 113* 92     LFT's:   Recent Labs     09/30/20  0548 10/01/20  0420 10/02/20  0629   * 206* 210*   * 113* 108*   BILITOT 5.3* 6.1* 7.3*   ALKPHOS 770* 780* 747*     Cardiac:   No results for input(s): TROPONINI, BNP in the last 72 hours. Coagulation:   Recent Labs     09/29/20  1229 10/02/20  0939   PROTIME 18.2* 16.8*   INR 1.56* 1.44*     Lactate: No results for input(s): LACTATE, LACTSEPSIS in the last 72 hours. ABG: No results for input(s): PHART, PTK5FVE, PO2ART, FSS3YVN in the last 72 hours.   U/A:  Recent Labs     10/02/20  1100   NITRU Negative   LEUKOCYTESUR Negative   PROTEINU TRACE*   GLUCOSEU Negative   KETUA TRACE*         Microbiology Cultures:   No results for input(s): BC in the last 72 hours. No results for input(s): Baudette Dunk in the last 72 hours. No results for input(s): LABURIN in the last 72 hours. EC2020: Sinus tachycardiaRight axis deviationPulmonary disease patternSeptal infarct, age undeterminedAbnormal ECGLow voltage limb leads      Imaging:  US ABDOMEN LIMITED   Final Result   1. Poor spectral waveform and color flow the main portal vein, partially occluding thrombus is not excluded. 2. Small amount of abdominal ascites. US DUP ABD PEL RETRO SCROT COMPLETE   Final Result   1. Poor spectral waveform and color flow the main portal vein, partially occluding thrombus is not excluded. 2. Small amount of abdominal ascites. XR CHEST PORTABLE   Final Result   Impression: Findings similar to the prior study with right lower lobe pulmonary mass lesion, diffuse reticular opacities and mediastinal lymphadenopathy. IR THROMB VEIN INFUSE INIT TX DAY   Final Result      Successful placement of 6 Wolof EKOS catheters into bilateral pulmonary arteries for bilateral pulmonary arterial thrombolysis. CTA CHEST W CONTRAST   Final Result      1. Acute pulmonary emboli involving the right and left main pulmonary arteries and the lobar and segmental branches supplying the right upper lobe, right middle lobe, right lower lobe, lingula, left upper lobe and left lower lobe. . Right heart strain. Right lower lung mass consistent with neoplasm. Diffuse reticulation and reticular nodular opacities right lung likely relates to lymphangitic spread of tumor. Bronchial thickening likely relates to peribronchial spread of tumor. Groundglass opacities left upper lobe right upper lobe and left lower lobe indeterminate. Findings may relate to atelectasis pneumonitis or inflammatory /infectious process.       Marked lymphadenopathy involving the right lasix trial 9/30, limited response 10/1, increasing Cr    BISMARK:  Cr increased from 0.9 on 9/30 to 1.3 on 10.2. During this interval he received trials of IV lasix and paracentesis that removed 1.7 L. BUN:Cr > 20. Some concern for hepato-renal etiology  - Nephrology consulted  - Additional diuresis per nephrology    Hypotonic Hyponatremia:  Appears hypervolemic from ascites, also possible contributing SIADH and poor oral intake. Concern for hepatorenal.  - Continue fluid restriction  - Lasix 40 IV today per nephro  - Nephrology consulted    Thrombocytopenia:  Likely 2/2 acute illness / severe liver disease. Possible DIC. HIT r/o.  - 10/1 HIT antibody negative  - 10/2 DIC labs - Elevated D-Dimer, Prolonged PT and PTT, normal Fibrinogen  - Peripheral smear collected      Recent pseudomonas infection of the port:  - Continue meropenem  - ID Consulted    This plan has been discussed with the attending physician, Dr. Neelam Velazco. Code Status: Full Code  FEN: DIET GENERAL; Daily Fluid Restriction: 1200 ml  PPX: hep gtt  DISPO: GMF    Carmen Aguilera MD PGY-1  10/2/2020, 12:00 PM       Patient seen and examined, plan of care discussed with residents. Agree with their assessment and plan with following addendum:    Discussed with nephrology- will try more diuresis today. Liver ultrasound showing small ascites, probably do not need additional tap, will discuss with GI. Cont with heparin drip and meropenem, IV lasix. ECHO pending.      Dustin Quiroz

## 2020-10-02 NOTE — PROGRESS NOTES
Notified by lab that pt's COVID test is negative.  currently working on getting result into Epic. Attempted to call ultrasound to inquire about when liver ultrasound can be completed today. No answer at this time. Will try again shortly.  Electronically signed by Cristiane Petit RN on 10/2/2020 at 8:01 AM

## 2020-10-02 NOTE — PROGRESS NOTES
Upon receiving report from Elías hemphill, night shift RN, BMP resulted with sodium of 120. Critical APTT resulted at 139.5. Dr. Yadira Trotter as well as Dr. Eloisa Gudino made aware of both values via perfect serve. APTT redrawn by Elías hemphill RN, in order to ensure accuracy. Hep gtt delayed for 60 minutes starting at 0735 by Brenna Cunha per protocol. 0148 heparin gtt restarted by this RN at decreased rate of 13 units/kg/hr per protocol. Jewel1 W Kilo Weiss, charge RN, received call from lab with results from APTT redraw at 126.0. Hep gtt currently running at 13 units/kg/hr. Will redraw APTT six hours from restart time, which will be 1445, per protocol. Will continue to monitor closely.  Electronically signed by Martín Bond RN on 10/2/2020 at 9:32 AM

## 2020-10-02 NOTE — PROGRESS NOTES
Walked order requisition for q4h serum sodium draws to lab. They stated that sodium would be added on to 1500 lab draws. Still awaiting results of antiXA in order to adjust heparin gtt appropriately.  Electronically signed by Burke Alejandre RN on 10/2/2020 at 4:54 PM

## 2020-10-03 PROBLEM — I51.89 RIGHT ATRIAL MASS: Status: ACTIVE | Noted: 2020-01-01

## 2020-10-03 NOTE — PROGRESS NOTES
[address] [place] [address]  GI Progress Note        Martine Maya is a 48 y.o. male patient. 1. Acute saddle pulmonary embolism with acute cor pulmonale (HCC)    2. Acute respiratory failure with hypoxia (HCC)    3. Other ascites        Admit Date: 2020    Subjective:       Still SOB no abd pain N/V GI bleeding    Scheduled Meds:   albumin human  25 g Intravenous Q6H    albuterol sulfate HFA  2 puff Inhalation 4x daily    meropenem  1 g Intravenous Q8H    sodium chloride flush  10 mL Intravenous 2 times per day       Continuous Infusions:   heparin (PORCINE) Infusion 19 Units/kg/hr (10/03/20 021)       PRN Meds:  melatonin, guaiFENesin-dextromethorphan, heparin (porcine), heparin (porcine), acetaminophen **OR** acetaminophen, polyethylene glycol, promethazine **OR** ondansetron, sodium chloride flush      Objective:       Patient Vitals for the past 24 hrs:   BP Temp Temp src Pulse Resp SpO2 Weight   10/03/20 0755 115/66 98 °F (36.7 °C) Axillary 102 22 92 % --   10/03/20 0512 127/82 98 °F (36.7 °C) Axillary 102 24 92 % --   10/03/20 0355 -- -- -- -- -- -- 253 lb 4.8 oz (114.9 kg)   10/03/20 0342 -- -- -- -- -- 94 % --   10/02/20 2249 115/74 97.4 °F (36.3 °C) Axillary 113 24 94 % --   10/02/20 2118 -- -- -- -- -- 94 % --   10/02/20 2026 110/84 97.9 °F (36.6 °C) Oral 116 24 94 % --   10/02/20 1558 119/80 98.4 °F (36.9 °C) Oral 108 22 97 % --   10/02/20 1458 -- -- -- -- 20 95 % --   10/02/20 1435 -- -- -- -- -- 94 % --   10/02/20 1430 -- -- -- -- -- 90 % --   10/02/20 1130 (!) 142/76 97.5 °F (36.4 °C) Oral 111 24 94 % --   10/02/20 1110 -- -- -- -- -- 91 % --       Exam:  VITALS:  /66   Pulse 102   Temp 98 °F (36.7 °C) (Axillary)   Resp 22   Ht 5' 7\" (1.702 m)   Wt 253 lb 4.8 oz (114.9 kg)   SpO2 92%   BMI 39.67 kg/m²   TEMPERATURE:  Current - Temp: 98 °F (36.7 °C);  Max - Temp  Av.9 °F (36.6 °C)  Min: 97.4 °F (36.3 °C)  Max: 98.4 °F (36.9 °C)    NAD  General appearance: alert, appears stated age, cooperative and no distress  Abdomen: obese soft NT        Recent Labs     10/01/20  0420 10/02/20  0520 10/03/20  0656   WBC 25.8* 27.2* 27.8*   HGB 14.2 14.1 13.1*   HCT 42.2 42.2 39.7*   MCV 84.7 84.7 85.1   * 73* 47*     Recent Labs     10/01/20  0420 10/02/20  0629 10/02/20  1600 10/03/20  0106 10/03/20  0656   * 120* 120* 119*  --    K 4.8 4.8  --   --   --    CL 92* 87*  --   --   --    CO2 20* 19*  --   --  19*   BUN 41* 52*  --   --  68*   CREATININE 1.2 1.3  --   --  1.3     Recent Labs     10/01/20  0420 10/02/20  0629 10/03/20  0656   * 210* 234*   * 108* 106*   BILIDIR 5.3* 6.2* 7.0*   BILITOT 6.1* 7.3* 8.4*   ALKPHOS 780* 747* 695*     Recent Labs     10/01/20  0420 10/02/20  0629 10/02/20  0939 10/03/20  0656   PROT 5.9* 6.0*  --  6.0*   INR  --   --  1.44*  --      Radiology review:   DATE: 10/2/2020         EXAM: CT ABDOMEN PELVIS W IV CONTRAST         INDICATION: Pulmonary emboli, distention, Pain         COMPARISON: August 2020         TECHNIQUE: Axial CT imaging obtained from lung bases through pelvis. Axial images and multiplanar reformatted images are provided for review.   Individualized dose optimization technique was used in order to meet ALARA standards for radiation dose    reduction.  In addition to vendor specific dose reduction algorithms, the dose reduction techniques vary based on the specific scanner utilized but frequently include automated exposure control, adjustment of the mA and/or kV according to patient size,    and use of iterative reconstruction technique.         IV Contrast: 80 mL Isovue-370    Oral Contrast: No         FINDINGS:         LIVER: Liver is diffusely inhomogeneous in attenuation with patchy areas of low density, without discrete mass.         GALLBLADDER AND BILIARY TREE: Gallbladder is not distended.  Small amount of fluid surrounds the gallbladder.  No intra- or extrahepatic biliary dilatation.         PANCREAS: Normal.      SPLEEN: Normal.         ADRENAL GLANDS: Normal.         KIDNEYS AND URETERS: No hydronephrosis. No urolithiasis.         URINARY BLADDER: Normal.         REPRODUCTIVE ORGANS: No mass.         BOWEL: No bowel dilatation. No evidence of obstruction.  No bowel wall thickening.         LYMPH NODES: No abnormally enlarged nodes.         PERITONEUM/RETROPERITONEUM: A moderate amount of ascites is present, new.         VESSELS: Aorta is normal caliber and proximal branch vessels are patent.  No definite venous thrombus.         ABDOMINAL WALL: Mild body wall edema.         BONES: No destructive process.              Impression         1. Moderate ascites, new. 2. Diffuse inhomogeneity of the liver without discrete mass. This could be flow related or related to hepatic congestion. Follow-up recommended.           UPPER ABDOMINAL ULTRASOUND -  limited         INDICATIONS: Abdominal ascites, lung cancer         Correlation with prior pet imaging the pulmonary CTA         Gray scale imaging, with Duplex Scan including spectral analysis and color doppler flow, hepatic vein and portal vein, was performed for evaluation for thrombosis.           FINDINGS:    Small amount of abdominal ascites is present, perihepatic. Small right pleural effusion         Poor flow within the main portal vein, evidence of portal vein thrombus. There is distorted spectral waveform. The left and right portal vein spectral waveform and color is normal.    Partially occluding main portal vein thrombus is not excluded         Hepatic veins are patent         Gallbladder: Contracted, nonfasting.         Common bile duct measures 4 mm, normal         Pancreas is not visualized due to overlying bowel gas         Right kidney measures 9.6 cm. No evidence of obstruction.              Impression    1. Poor spectral waveform and color flow the main portal vein, partially occluding thrombus is not excluded.     2. Small amount of abdominal ascites.                    Assessment:       Active Problems:    Acute saddle pulmonary embolism with acute cor pulmonale (HCC)    Acute respiratory failure with hypoxia (HCC)    Primary adenocarcinoma of lung (HCC)    Mediastinal lymphadenopathy    Other ascites    History of DVT in adulthood  Resolved Problems:    * No resolved hospital problems.  *    Ascites improved on follow up CT  Likely portal vein thrombosis  No HE or GI bleeding  Likely passive congestion of liver     Biggest issue is resp status    Recommendations:       Anticoagulation already in place for PE, may also treat potential portal vein thrombosis  Serial liver chem  Will check back on Monday call if any acute GI issues    Jose [facility] AND [facility]  10/3/2020  8:49 AM

## 2020-10-03 NOTE — PLAN OF CARE
Problem: Respiratory:  Goal: Ability to maintain adequate ventilation will improve  Description: Ability to maintain adequate ventilation will improve  Outcome: Ongoing   Pt's SPO2 is 92-94% on 7L O2 via high flow nasal cannula. Pt is tachypneic. Will continue to monitor. Problem: Bleeding:  Goal: Will show no signs and symptoms of excessive bleeding  Description: Will show no signs and symptoms of excessive bleeding  Outcome: Ongoing   Pt is on heparin gtt. Monitoring labs.

## 2020-10-03 NOTE — CONSULTS
History of Present Illness:  Dagmar Beauchamp is a 48 y.o. patient whom we were asked to evaluate for RV mass. Hx of stage ARNALDO adenocarcinoma of lung. Has hx of DVT and PE. Admitted with recurrent PE with saddle embolus and S/P EKOS. Noted by echo to have echogenic mass in RV. Likely thrombus. Could be other. Remains sob. No chest pain. abd full, diffusely tender. Past Medical History:   has a past medical history of Cancer ([facility] [address] Chicken pox, DVT, bilateral lower limbs ([facility] [address] and Lung cancer ([facility] [address] Surgical History:   has a past surgical history that includes IR PORT PLACEMENT > 5 YEARS (8/25/2020). Social History:   reports that he has never smoked. He has never used smokeless tobacco. He reports current alcohol use. He reports that he does not use drugs. Family History:  No evidence for sudden cardiac death or premature CAD    Home Medications:  Were reviewed and are listed in nursing record. and/or listed below  Prior to Admission medications    Medication Sig Start Date End Date Taking? Authorizing Provider   apixaban (ELIQUIS) 5 MG TABS tablet Take 1 tablet by mouth 2 times daily 8/5/20  Yes Historical Provider, MD   folic acid (FOLVITE) 1 MG tablet Take 1 mg by mouth daily 8/8/20   Historical Provider, MD        Allergies:  Amoxicillin     Review of Systems:       · Constitutional: there has been no unanticipated weight loss. There's been no change in energy level, sleep pattern, or activity level. · Eyes: No visual changes or diplopia. No scleral icterus. · ENT: No Headaches, hearing loss or vertigo. No mouth sores or sore throat. · Cardiovascular: No loss of consciousness. No hemoptysis, pleuritic pain, or phlebitis. · Respiratory: No cough or wheezing, no sputum production. No hematemesis. As above  · Gastrointestinal: abd fullness, tender  · Genitourinary: No dysuria, trouble voiding, or hematuria.   · Musculoskeletal:  No gait disturbance, weakness or joint Pharyngitis, acute    Conjunctivitis    Pain in soft tissues of limb    Skin disorder    Hyperlipemia    Acute saddle pulmonary embolism with acute cor pulmonale (HCC)    Acute respiratory failure with hypoxia (HCC)    Primary adenocarcinoma of lung (HCC)    Mediastinal lymphadenopathy    Other ascites    History of DVT in adulthood    Right atrial mass         Plan:    Stage ARNALDO adenocarcinoma of the lung with DVT and recurrent and chronic PE on ELIQUIS. Had Acute saddle thrombus in lung and s/p EKOS. Noted on recent echo to have echogenic structure in RV. With all the thrombotic issues and hypokinetic RV likely represents thrombus. Even if other pathology with his multiple other medical issues and metastatic lung Ca he would not be candiate for surgical intervention. He is on Tx for thrombus with anticoagulation. Would tx with AC. Will however review echo also.

## 2020-10-03 NOTE — PROGRESS NOTES
[facility] NEPHROLOGY    [facility]. [facility]              [phone]                      Mr. Isra Davis is admitted with multivessel pulmonary embolisms. We are following for hyponatremia. Interval History and plan: Worsening sodium despite diuresis. BNP is 9921 might serum osmolality is 286 despite low sodium. This suggest pseudohyponatremia  Urine sodium is less than 20 with a creatinine of 248. Echocardiogram showed LVEF 55 to 60%. There may be artery thrombus versus myxoma. Uric acid is 9.4. Discontinue Lasix  Start albumin 25 mg IV every 6 hourly  Stat renal panel. Check serum for free light chains, lipid profile. Continue heparin drip  Continue antibiotics  Hyponatremia in the face of normal serum osmolality is considered pseudohyponatremia. It may be from obstructive jaundice/liver disease. Assessment :     Hyponatremia  Unclear, complex case. Likely due to decrease poor oral intake, liver disease or congestive heart failure. Less likely SIADH due to decreased urine sodium. Urine osmolality of 806 suggestive of decreased renal perfusion due to decreased intravascular volume from hepatorenal or cardiorenal source. Shortness of breath can be contributed 2/2 adenocarcinoma, PE or stress on heart. [facility] Nephrology would like to thank Jordy Shepherd MD   for opportunity to serve this patient      Please call with questions at-   24 Hrs Answering service [phone] or  7 am- 5 pm via Perfect serve or cell phone        CC/reason for consult :     hyponatremia     HPI :     Isra Davis is a 48 y.o. male presented to   the hospital on 9/28/2020 with dyspnea for five days. Patient states his dyspnea has been progressively getting worse. His symptoms are exacerbated on exertion. He states he has ran out of his home Eliquis one week ago and has not been taking it.   Simultaneously, his use of his CPAP more frequently during the day, which he only has been using at night. CT imaging revealed multivessel pulmonary embolisms, ascites and ground glass opacities in his EVER, RUL, LLL. He underwent EKOS which made him feel somewhat better. Patient's lab workup shown he was hyponatremia, for which nephrology was consulted. ROS:     Seen with-     positives in bold   Constitutional:  fever, chills, weakness, weight change, fatigue  Skin:  rash, pruritus, hair loss, bruising, dry skin, petechiae  Head, Face, Neck   headaches, swelling,  cervical adenopathy  Respiratory: shortness of breath, cough, or wheezing  Cardiovascular: chest pain, palpitations, dizzy, edema  Gastrointestinal: nausea, vomiting, diarrhea, constipation,belly pain    Yellow skin, blood in stool  Musculoskeletal:  back pain, muscle weakness, gait problems,       joint pain or swelling. Genitourinary:  dysuria, poor urine flow, flank pain, blood in urine  Neurologic:  vertigo, TIA'S, syncope, seizures, focal weakness  Psychosocial:  insomnia, anxiety, or depression. Additional positive findings:                        All other remaining systems are negative or unable to obtain        PMH/PSH/SH/Family History:     Past Medical History:   Diagnosis Date    Cancer [facility])     lung  cancer 2020    Chicken pox     DVT, bilateral lower limbs (HCC)     Lung cancer ([facility] [address]        Past Surgical History:   Procedure Laterality Date    IR PORT PLACEMENT EQUAL OR GREATER THAN 5 YEARS  8/25/2020    IR PORT PLACEMENT EQUAL OR GREATER THAN 5 YEARS 8/25/2020 [facility] SPECIAL PROCEDURES        reports that he has never smoked. He has never used smokeless tobacco. He reports current alcohol use. He reports that he does not use drugs. family history includes Cancer in his father; High Blood Pressure in his father; High Cholesterol in his mother.          Medication:     Current Facility-Administered Medications: albumin human 25 % IV solution 25 g, 25 g, Intravenous, Q6H  melatonin tablet 6 mg, 6 mg, Oral, Nightly PRN  guaiFENesin-dextromethorphan (ROBITUSSIN DM) 100-10 MG/5ML syrup 5 mL, 5 mL, Oral, Q4H PRN  heparin (porcine) injection 8,530 Units, 80 Units/kg, Intravenous, PRN  heparin (porcine) injection 4,260 Units, 40 Units/kg, Intravenous, PRN  heparin 25,000 units in dextrose 5% 250 mL infusion, 19 Units/kg/hr, Intravenous, Continuous  albuterol sulfate  (90 Base) MCG/ACT inhaler 2 puff, 2 puff, Inhalation, 4x daily  acetaminophen (TYLENOL) tablet 650 mg, 650 mg, Oral, Q6H PRN **OR** acetaminophen (TYLENOL) suppository 650 mg, 650 mg, Rectal, Q6H PRN  polyethylene glycol (GLYCOLAX) packet 17 g, 17 g, Oral, Daily PRN  promethazine (PHENERGAN) tablet 12.5 mg, 12.5 mg, Oral, Q6H PRN **OR** ondansetron (ZOFRAN) injection 4 mg, 4 mg, Intravenous, Q6H PRN  meropenem (MERREM) 1 g in sodium chloride 0.9 % 100 mL IVPB (mini-bag), 1 g, Intravenous, Q8H  sodium chloride flush 0.9 % injection 10 mL, 10 mL, Intravenous, 2 times per day  sodium chloride flush 0.9 % injection 10 mL, 10 mL, Intravenous, PRN       Vitals :     Vitals:    10/03/20 0512   BP: 127/82   Pulse: 102   Resp: 24   Temp: 98 °F (36.7 °C)   SpO2: 92%       I & O :       Intake/Output Summary (Last 24 hours) at 10/3/2020 0727  Last data filed at 10/3/2020 0617  Gross per 24 hour   Intake 1590.02 ml   Output 825 ml   Net 765.02 ml        Physical Examination :     General appearance: Anxious- no, distressed- no, in good spirits- yes  HEENT: Lips- normal, teeth- ok , oral mucosa- moist  Neck : Mass- no, appears symmetrical, JVD- not visible  Respiratory: Respiratory effort-  normal, wheeze- no, crackles - none, decreased breath sounds bilateral bases  Cardiovascular:  Ausculation- No M/R/G, Edema bilateral LE  Abdomen: visible mass- no, distention- no, scar- no, tenderness- no                            hepatosplenomegaly-  no  Musculoskeletal:  clubbing no,cyanosis- no , digital ischemia- no                           muscle strength- grossly normal , tone - grossly normal  Skin: rashes- no , ulcers- no, induration- no, tightening - no  Psychiatric:  Judgement and insight- normal           AAO X 3  Additional finding:      LABS:     Recent Labs     10/01/20  0420 10/02/20  0520   WBC 25.8* 27.2*   HGB 14.2 14.1   HCT 42.2 42.2   * 73*     Recent Labs     10/01/20  0420 10/02/20  0629 10/02/20  1600 10/03/20  0106   * 120* 120* 119*   K 4.8 4.8  --   --    CL 92* 87*  --   --    CO2 20* 19*  --   --    BUN 41* 52*  --   --    CREATININE 1.2 1.3  --   --    GLUCOSE 113* 92  --   --             Patient was seen and examined and the case was discussed with the resident. He acted as my scribe. I agree with the assessment and plan.     Thanks  Nephrology  Travis Preston [address]  Office: [phone]  Cell: [phone]  Fax: [phone]

## 2020-10-03 NOTE — PROGRESS NOTES
Pt refused for family to be updated via phone at this time. Pt states he has already spoke to his wife today about plan of care.  Electronically signed by Suman Santos RN on 10/3/2020 at 5:43 PM

## 2020-10-03 NOTE — PROGRESS NOTES
Internal Medicine  Progress Note    Admission Date: 9/28/2020     Chief Complaint: Shortness of Breath    History of Present Illness:  Nikolai Quinn is a 49 yo M w/ PMH of Stage ARNALDO adenocarcinoma of the R lung (cT2, cN2, CM1a) on carboplatin/pemetrexed c/b DVT and chronic PE on Eliquis, HTN, and HLD who presented with acute PE. On 9/28/20 he was found to have acute PE of the b/l main pulmonary arteries with right heart strain. Interval History:  ECHO showed RV dysfunction and clot with RV. LVEF 55%. Abdominal and chest CT repeated for consideration of thrombectomy. Interval slight improvement in clot size. Na dropped to 119. Dr Ras Lr contacted. Albumin started. Na 122 this AM.   Plts are now 47. HIT Ab negative.  s/p 40mg IV lasix x2. Patient feels about the same. Remains on 6L and SOB with full sentences. Does not complain of abdominal pain, and distension is stable. Has had BMs and is eating. Past Medical History:      Diagnosis Date    Cancer [facility])     lung  cancer 2020    Chicken pox     DVT, bilateral lower limbs (HCC)     Lung cancer (HCC)          Past Surgical History:      Procedure Laterality Date    IR PORT PLACEMENT EQUAL OR GREATER THAN 5 YEARS  8/25/2020    IR PORT PLACEMENT EQUAL OR GREATER THAN 5 YEARS 8/25/2020 [facility] SPECIAL PROCEDURES         Medications Prior to Admission:  Medications Prior to Admission: apixaban (ELIQUIS) 5 MG TABS tablet, Take 1 tablet by mouth 2 times daily  folic acid (FOLVITE) 1 MG tablet, Take 1 mg by mouth daily      Allergies:  Amoxicillin      Family History:      Problem Relation Age of Onset    High Cholesterol Mother     High Blood Pressure Father     Cancer Father         Prostate         Social History:  Tobacco:  reports that he has never smoked. He has never used smokeless tobacco.  Alcohol:  reports current alcohol use.     Review of Systems:  As above    Vitals:    10/02/20 2118 10/02/20 2249 10/03/20 0342 10/03/20 0512   BP: 115/74  127/82   Pulse:  113  102   Resp:  24  24   Temp:  97.4 °F (36.3 °C)  98 °F (36.7 °C)   TempSrc:  Axillary  Axillary   SpO2: 94% 94% 94% 92%   Weight:       Height:         Physical Exam:    Constitutional: Awake. Well-developed and well-nourished. No acute distress. HEENT: Normocephalic and atraumatic. No scleral icterus. Cardiovascular: Regular tachycaria. Normal S1, S2. No m/r/g. Pulmonary: Mildly increased work of breathing. conversational dyspnea. dimished sounds with faint crackles on right lower lung. Right upper and left sided clear to auscultation. SpO2 90 on 6 lpm.  Gastrointestinal: BS+. Protuberant. Soft. No tenderness. Distended. Musculoskeletal: Minimal or no peripheral edema. Skin: No cyanosis or pallor. Neurological: Alert and oriented to person, place, time, and situation. Labs:  CBC:   Recent Labs     10/01/20  0420 10/02/20  0520   WBC 25.8* 27.2*   HGB 14.2 14.1   HCT 42.2 42.2   * 73*       BMP:   Recent Labs     10/01/20  0420 10/02/20  0629 10/02/20  1600 10/03/20  0106   * 120* 120* 119*   K 4.8 4.8  --   --    CL 92* 87*  --   --    CO2 20* 19*  --   --    BUN 41* 52*  --   --    CREATININE 1.2 1.3  --   --    GLUCOSE 113* 92  --   --      LFT's:   Recent Labs     10/01/20  0420 10/02/20  0629   * 210*   * 108*   BILITOT 6.1* 7.3*   ALKPHOS 780* 747*     Cardiac:   No results for input(s): TROPONINI, BNP in the last 72 hours. Coagulation:   Recent Labs     10/02/20  0939   PROTIME 16.8*   INR 1.44*     Lactate: No results for input(s): LACTATE, LACTSEPSIS in the last 72 hours. ABG: No results for input(s): PHART, GFT7TRI, PO2ART, AWM1QWT in the last 72 hours. U/A:  Recent Labs     10/02/20  1100   NITRU Negative   LEUKOCYTESUR Negative   PROTEINU TRACE*   GLUCOSEU Negative   KETUA TRACE*         Microbiology Cultures:   No results for input(s): BC in the last 72 hours. No results for input(s): Corine Patient in the last 72 hours.   No results for input(s): Jb Nayak in the last 72 hours. EC2020: Sinus tachycardiaRight axis deviationPulmonary disease patternSeptal infarct, age undeterminedAbnormal ECGLow voltage limb leads      Imaging:  CT ABDOMEN PELVIS W IV CONTRAST Additional Contrast? None   Final Result      1. Moderate ascites, new. 2. Diffuse inhomogeneity of the liver without discrete mass. This could be flow related or related to hepatic congestion. Follow-up recommended. CTA PULMONARY W CONTRAST   Final Result      1. Bilateral extensive pulmonary emboli. Pulmonary emboli appear slightly decreased in size/volume, compared to prior study. No new or enlarging pulmonary emboli. No central saddle embolus. 2. Right lower lobe and central pulmonary mass with right hilar and confluent mediastinal adenopathy. 3. Interstitial thickening throughout the right lung unchanged. 4. Groundglass airspace disease left lower lobe and anterior left upper lobe . Left upper lobe groundglass airspace disease is slightly increased. US ABDOMEN LIMITED   Final Result   1. Poor spectral waveform and color flow the main portal vein, partially occluding thrombus is not excluded. 2. Small amount of abdominal ascites. US DUP ABD PEL RETRO SCROT COMPLETE   Final Result   1. Poor spectral waveform and color flow the main portal vein, partially occluding thrombus is not excluded. 2. Small amount of abdominal ascites. XR CHEST PORTABLE   Final Result   Impression: Findings similar to the prior study with right lower lobe pulmonary mass lesion, diffuse reticular opacities and mediastinal lymphadenopathy. IR THROMB VEIN INFUSE INIT TX DAY   Final Result      Successful placement of 6 [place] EKOS catheters into bilateral pulmonary arteries for bilateral pulmonary arterial thrombolysis. CTA CHEST W CONTRAST   Final Result      1.  Acute pulmonary emboli involving the right and left main pulmonary arteries and the lobar and segmental branches supplying the right upper lobe, right middle lobe, right lower lobe, lingula, left upper lobe and left lower lobe. . Right heart strain. Right lower lung mass consistent with neoplasm. Diffuse reticulation and reticular nodular opacities right lung likely relates to lymphangitic spread of tumor. Bronchial thickening likely relates to peribronchial spread of tumor. Groundglass opacities left upper lobe right upper lobe and left lower lobe indeterminate. Findings may relate to atelectasis pneumonitis or inflammatory /infectious process. Marked lymphadenopathy involving the right hilum ,mediastinum and right supraclavicular region. Large volume of ascites. Findings called as a critical result to the emergency room by Dr. Maurisio Santiago at the time of dictation 9/28/2020 5:00 PM      XR CHEST PORTABLE   Final Result      Right lower lung intrapulmonary mass most consistent with neoplasm. Mediastinal and hilar lymphadenopathy likely relates to metastasis. Reticular nodular opacities noted diffusely in the right lung indeterminate. Lymphangitic spread of tumor is not excluded. Assessment and Plan:  Hayn Brunner is a 48 y.o. male with a PMH of Stage ARNALDO adenocarcinoma of the R lung (cT2, cN2, CM1a) on carboplatin/pemetrexed c/b DVT and chronic PE on Eliquis, HTN, and HLD who presented with acute PE. Pulmonary embolus with core pulmonale, stable  S/p EKOS on 9/28  - Continue heparin gtt. Considered oral anticoagulants. Holding off for now while additional procedures are considered. - Holding home eliquis  - clot within RV, interval improvement in size of PE.   - difficult to wean from supplemental oxygen. - thrombectomy is being considered  - given the continued drop in platelets and subtherapeutic AntiXa, Heparin will be stopped and argatroban will be started.  Discussed with hematology and pharmacy.      Acute on chronic hypoxic resp failure  multifactorial- PE, lung cancer, possible pneumonia  - Supplemental O2 PRN    Possible pneumonia  - Continue meropenem  - Stopp Vanc 9/30  - 9/28 Blood cx NGTF  - MRSA negative , COVID negative     Lung adenocarcinoma stage IV  S/p one cycle of carbo/alimta in Aug. Now on novolumab/ipillimumab  CT is concerning for lymphangitic spread of cancer- pulmonology is reviewing his films. New onset ascites, stable  Likely 2/2 liver pathology, possibly 2/2 CHF  Markedly elevated LFTs (AST, ALT, Alk Phos, Bilirubin)  10/1 Paracentesis SAAG 1.7 (portal HTN is likely cause with 97% accuracy), fluid protein 1.3 consistent with hepatic origin of transudate not cardiac. Concern for Budd-Chiari.  - Echo pending  - Liver U/S 10/2 unable to exclude partially occluding thrombus of portal vein, revealed small ascites  - Paracentesis 9/30, 1.7 L removed. - Holding off on repeat para for now  - Responded well to lasix trial 9/30, limited response 10/1, increasing Cr    BISMARK, stable  Cr increased from 0.9 on 9/30 to 1.3 on 10.2. During this interval he received trials of IV lasix and paracentesis that removed 1.7 L. BUN:Cr > 20. Some concern for hepato-renal etiology  - Nephrology consulted  - Additional diuresis per nephrology  - CR stable. UOP inadequate. - albumin added    Hypotonic Hyponatremia:  Appears hypervolemic from ascites, also possible contributing SIADH and poor oral intake. Concern for hepatorenal.  - Continue fluid restriction  - albumin added  - Nephrology consulted    Thrombocytopenia:  Likely 2/2 acute illness / severe liver disease. Possible DIC. HIT r/o.  - 10/1 HIT antibody negative  - 10/2 DIC labs - fibrinogen unchanged. - Peripheral smear collected      Recent pseudomonas infection of the port:  - Continue meropenem  - ID Consulted. Plan to continue through Monday.      Lactic Acidosis  - possibly related to liver failure and tumor burden  - worsened after diuresis  - continue to

## 2020-10-03 NOTE — PLAN OF CARE
Problem: Respiratory:  Goal: Ability to maintain adequate ventilation will improve  Description: Ability to maintain adequate ventilation will improve  10/3/2020 1316 by Latoya Kevin RN  Note: SpO2 level 91% on 7L via nasal cannula. Continuous pulse oximeter in use at bedside. Lungs are clear/diminished to auscultation. Dyspnea noted with minimal exertion. Occasional non-productive cough reported. Inhalers given QID via RT as scheduled. Will continue to monitor oxygenation/respiratory functioning and will report changes in condition to physician. Problem: Bleeding:  Goal: Will show no signs and symptoms of excessive bleeding  Description: Will show no signs and symptoms of excessive bleeding  10/3/2020 1316 by Latoya Kevin RN  Note: Vitals signs stable (see doc flow sheets). No s/s of bleeding present. Heparin gtt stopped and Argatraban drip initiated as ordered. Next APTT due at 1700 per protocol. Will continue to monitor for s/s of bleeding. Problem: Falls - Risk of:  Goal: Will remain free from falls  Description: Will remain free from falls  Note: Pt is a Fall Risk. See Radha Gabriella Fall Risk Score. Pt bed in low position, bed wheels locked, non-skid socks in use, and 2/4 side rails up. Pt up with standby assist to the bathroom. Call light and belongings left within reach and pt encouraged to call for assistance. Will continue with hourly rounds for PO intake, pain needs, toileting, and repositioning as needed. No need expressed at this time.

## 2020-10-03 NOTE — PROGRESS NOTES
ONCOLOGY HEMATOLOGY CARE  PROGRESS NOTE    SUBJECTIVE:       Numerous problems with anticoagulation dosing. In addition, platelet count has been dropping. Several consultants involved-ID for prior Pseudomonas sepsis/port infection, Cardiology for RV mass, GI for new ascites/portal vein thrombosis, Nephrology for hyponatremia. .    He has bilateral PET, now with portal vein thrombosis/ascites. He said that he had stopped Eliquis for 2 weeks because he ran out of his rx, and did not realize he was supposed to continue. Denies any significant hemoptysis, absolutely no hematemesis, melena, hematochezia or hematuria. PHYSICAL EXAM:       /66   Pulse 102   Temp 98 °F (36.7 °C) (Axillary)   Resp 22   Ht 5' 7\" (1.702 m)   Wt 253 lb 4.8 oz (114.9 kg)   SpO2 93%   BMI 39.67 kg/m²     General appearance: Alert and cooperative. Coughing, tachypneic, on O2. Head: Normocephalic, without obvious abnormality, atraumatic  EENT:  No icterus. No mucositis. Neck: No JVD. Lymph Nodes:  No palpable lymph nodes in the cervical, supraclavicular, axillary areas. Lungs: On O2, tachypneic. Heart: Regular rate and rhythm, S1, S2 normal  Abdomen: +distended abdomen. .  Extremities: without cyanosis, clubbing, edema  Skin: No jaundice. Ecchymoses in left antecubital fossa from venipunctures. Musculoskeletal:  No joint swelling, deformities, tenderness, erythema. Neuro:  Alert and oriented. Speech is halting, has to stop to breathe. Cranial nerves are intact. Our conversation was appropriate.       MEDS:     Current Facility-Administered Medications   Medication Dose Route Frequency Provider Last Rate Last Dose    albumin human 25 % IV solution 25 g  25 g Intravenous Q6H Jo Garay MD   Stopped at 10/03/20 0920    argatroban 250 mg in dextrose 5 % 250 mL infusion  1 mcg/kg/min Intravenous Continuous Margy Castillo MD        melatonin tablet 6 mg  6 mg Oral Nightly PRN Amber Durant MD   6 mg at 10/03/20 0055    guaiFENesin-dextromethorphan (ROBITUSSIN DM) 100-10 MG/5ML syrup 5 mL  5 mL Oral Q4H PRN Russel Mark MD   5 mL at 10/03/20 0953    albuterol sulfate  (90 Base) MCG/ACT inhaler 2 puff  2 puff Inhalation 4x daily Paola Louise MD   2 puff at 10/03/20 0909    acetaminophen (TYLENOL) tablet 650 mg  650 mg Oral Q6H PRN Carl Sunshine MD        Or   Nath acetaminophen (TYLENOL) suppository 650 mg  650 mg Rectal Q6H PRN Carl Sunshine MD        polyethylene glycol (GLYCOLAX) packet 17 g  17 g Oral Daily PRN Carl Sunshine MD        promethazine (PHENERGAN) tablet 12.5 mg  12.5 mg Oral Q6H PRN Carl Sunshine MD        Or    ondansetron (ZOFRAN) injection 4 mg  4 mg Intravenous Q6H PRN Carl Sunshine MD        meropenem (MERREM) 1 g in sodium chloride 0.9 % 100 mL IVPB (mini-bag)  1 g Intravenous Q8H Carl Sunshine MD   Stopped at 10/03/20 0954    sodium chloride flush 0.9 % injection 10 mL  10 mL Intravenous 2 times per day Carl Sunshine MD   10 mL at 10/03/20 0820    sodium chloride flush 0.9 % injection 10 mL  10 mL Intravenous PRN Carl Sunshine MD           LABS:     CBC:   Lab Results   Component Value Date    WBC 27.8 (H) 10/03/2020    HGB 13.1 (L) 10/03/2020    HCT 39.7 (L) 10/03/2020    MCV 85.1 10/03/2020    PLT 47 (L) 10/03/2020    LYMPHOPCT 4.0 10/03/2020    RBC 4.66 10/03/2020    MCH 28.1 10/03/2020    MCHC 33.0 10/03/2020    RDW 18.4 (H) 10/03/2020    NEUTOPHILPCT 87.0 10/03/2020    MONOPCT 8.0 10/03/2020    BASOPCT 0.0 10/03/2020    NEUTROABS 24.2 (H) 10/03/2020    LYMPHSABS 1.1 10/03/2020    MONOSABS 2.2 (H) 10/03/2020    EOSABS 0.3 10/03/2020    BASOSABS 0.0 10/03/2020       CMP:   Lab Results   Component Value Date     10/03/2020    K 5.2 10/03/2020    K 5.0 10/03/2020    CL 88 10/03/2020    CO2 16 10/03/2020    BUN 64 10/03/2020    CREATININE 1.1 10/03/2020    GLUCOSE 106 10/03/2020    CALCIUM 8.0 10/03/2020    PROT 6.0 10/03/2020    LABALBU 2.6 10/03/2020    BILITOT 8.4 10/03/2020    ALKPHOS 695 10/03/2020     10/03/2020     10/03/2020          Blood cultures from 9/28 so far negative. Covid testing negative  Await HIT testing. 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 This could be HIT, or due to antibiotics. PLAN:       Discussed with Dr. Albino Acosta and Dr. Mino Nicole. HIT ordered. D/C heparin and start Argatroban instead.   Continue iv antibiotics    [facility]

## 2020-10-04 NOTE — PROCEDURES
ENDOTRACHEAL INTUBATION    INDICATION: Life threatening respiratory failure    TIME OUT: taken    SEDATION: Etomidate 30 mg and propofol 100 mg    PROCEDURE: Using video laryngoscopy, the vocal cords were well visualized and an 8 mm endotracheal tube was place directly through the cords. Good breath sounds auscultated bilaterally without sounds over abdomen. Appropriate CO2 waveform on the monitor. However there was a large cuff leak noted and ETT was replaced with a 7.5 over a tube exchanger. Placement confirmed with et-CO2 waveform on monitor and auscultation over lungs with absence of sound over abdomen.  CXr pending    Vent: 100% FIO2, PEEP 5 , Vt 550 and RR 30    EBL 0    Ve Byran RICK

## 2020-10-04 NOTE — PROGRESS NOTES
Pt's aPTT was critical >248 after following argatroban drip protocol. Turned off drip for now again. Notified oncall resident. Ok to hold drip for now per oncall resident.

## 2020-10-04 NOTE — PROGRESS NOTES
Patient transferred from PCU to CCU 4512. Oriented to room and plan of care, call light within reach. Pt with tachypnea, diaphoretic, skin cool. Pt on 100% NRB at this time. O2 sats in mid-90's. Abdomen distended, BS audible. Denies pain at this time.

## 2020-10-04 NOTE — CONSULTS
patient lives at    Alcohol:  Illicit drugs: no use  Tobacco:      Family History:  Family History   Problem Relation Age of Onset    High Cholesterol Mother     High Blood Pressure Father     Cancer Father         Prostate       MEDICATIONS:     No current facility-administered medications on file prior to encounter. Current Outpatient Medications on File Prior to Encounter   Medication Sig Dispense Refill    apixaban (ELIQUIS) 5 MG TABS tablet Take 1 tablet by mouth 2 times daily      folic acid (FOLVITE) 1 MG tablet Take 1 mg by mouth daily           Scheduled Meds:   ipratropium-albuterol  1 ampule Inhalation Q4H WA    phenylephrine        albumin human  25 g Intravenous Q6H    meropenem  1 g Intravenous Q8H    sodium chloride flush  10 mL Intravenous 2 times per day      Continuous Infusions:   furosemide (LASIX) 1mg/ml infusion Stopped (10/04/20 1440)    phenylephrine (OLIVIA-SYNEPHRINE) 50mg/250mL infusion 300 mcg/min (10/04/20 1621)    argatroban infusion 0.5 mcg/kg/min (10/03/20 2152)     PRN Meds:morphine, melatonin, guaiFENesin-dextromethorphan, polyethylene glycol, promethazine **OR** ondansetron, sodium chloride flush    Allergies: Allergies   Allergen Reactions    Amoxicillin        REVIEW OF SYSTEMS:       History obtained from the patient    Review of Systems   Constitutional: Positive for activity change, diaphoresis, fatigue and unexpected weight change. Respiratory: Positive for chest tightness, shortness of breath and wheezing. Negative for choking. Cardiovascular: Negative for chest pain, palpitations and leg swelling. Gastrointestinal: Positive for abdominal distention. Negative for diarrhea and nausea.        PHYSICAL EXAM:       Vitals: BP (!) 97/57   Pulse 136   Temp 98.1 °F (36.7 °C) (Axillary)   Resp 27   Ht 5' 7\" (1.702 m)   Wt 255 lb 8.2 oz (115.9 kg)   SpO2 98%   BMI 40.02 kg/m²     I/O:      Intake/Output Summary (Last 24 hours) at 10/4/2020 1656  Last data filed at 10/4/2020 1124  Gross per 24 hour   Intake 520 ml   Output 575 ml   Net -55 ml     No intake/output data recorded. I/O last 3 completed shifts: In: 693 [P.O.:300; I.V.:477; IV Piggyback:200]  Out: 575 [Urine:575]    Physical Examination:     Physical Exam  Constitutional:       General: He is in acute distress. Appearance: He is obese. He is diaphoretic. Cardiovascular:      Rate and Rhythm: Normal rate and regular rhythm. Pulmonary:      Effort: Respiratory distress present. Breath sounds: Wheezing present. Abdominal:      General: Bowel sounds are normal. There is distension. Palpations: Abdomen is soft. Musculoskeletal:      Right lower leg: Edema present. Left lower leg: Edema present. Skin:     General: Skin is warm. Capillary Refill: Capillary refill takes less than 2 seconds. Neurological:      General: No focal deficit present. Mental Status: He is alert and oriented to person, place, and time. Access:                               -Peripheral Access Day#:14  Vent Settings: Vent Mode: AC/PRVC Rate Set: 16 bmp/Vt Ordered: 500 mL/ /FiO2 : 100 %    No results for input(s): PHART, GBX1STA, PO2ART in the last 72 hours. DATA:       Labs:  CBC:   Recent Labs     10/02/20  0520 10/03/20  0656 10/04/20  0229   WBC 27.2* 27.8* 28.6*   HGB 14.1 13.1* 12.6*   HCT 42.2 39.7* 38.4*   PLT 73* 47* 53*       BMP:   Recent Labs     10/03/20  0656 10/03/20  0905  10/04/20  0045 10/04/20  0229 10/04/20  1118   * 122*   < > 122* 122* 126*   K 5.0 5.2*  --   --  5.3*  --    CL 86* 88*  --   --  86*  --    CO2 19* 16*  --   --  18*  --    BUN 68* 64*  --   --  72*  --    CREATININE 1.3 1.1  --   --  1.3  --    GLUCOSE 117* 106*  --   --  93  --    PHOS  --  3.8  --   --   --   --     < > = values in this interval not displayed.      LFT's:   Recent Labs     10/02/20  0629 10/03/20  0656 10/04/20  0229   * 234* 213*   * 106* 85*   BILITOT 7. 3* 8.4* 9.6*   ALKPHOS 747* 695* 582*     Troponin: No results for input(s): TROPONINI in the last 72 hours. BNP:No results for input(s): BNP in the last 72 hours. ABGs: No results for input(s): PHART, GLF5MLX, PO2ART in the last 72 hours. INR:   Recent Labs     10/02/20  0939   INR 1.44*       U/A:  Recent Labs     10/02/20  1100   COLORU Yellow   PHUR 5.5   WBCUA 0-2   RBCUA 0-2   BACTERIA 1+*   CLARITYU Clear   SPECGRAV >=1.030   LEUKOCYTESUR Negative   UROBILINOGEN 1.0   BILIRUBINUR LARGE*   BLOODU Negative   GLUCOSEU Negative   AMORPHOUS 2+       XR CHEST PORTABLE   Final Result   Impression:   1. Bilateral airspace disease stable. 2. Right lung mass with margins obscured by surrounding consolidation. CT ABDOMEN PELVIS W IV CONTRAST Additional Contrast? None   Final Result      1. Moderate ascites, new. 2. Diffuse inhomogeneity of the liver without discrete mass. This could be flow related or related to hepatic congestion. Follow-up recommended. CTA PULMONARY W CONTRAST   Final Result      1. Bilateral extensive pulmonary emboli. Pulmonary emboli appear slightly decreased in size/volume, compared to prior study. No new or enlarging pulmonary emboli. No central saddle embolus. 2. Right lower lobe and central pulmonary mass with right hilar and confluent mediastinal adenopathy. 3. Interstitial thickening throughout the right lung unchanged. 4. Groundglass airspace disease left lower lobe and anterior left upper lobe . Left upper lobe groundglass airspace disease is slightly increased. US ABDOMEN LIMITED   Final Result   1. Poor spectral waveform and color flow the main portal vein, partially occluding thrombus is not excluded. 2. Small amount of abdominal ascites. US DUP ABD PEL RETRO SCROT COMPLETE   Final Result   1. Poor spectral waveform and color flow the main portal vein, partially occluding thrombus is not excluded. 2. Small amount of abdominal ascites. XR CHEST PORTABLE   Final Result   Impression: Findings similar to the prior study with right lower lobe pulmonary mass lesion, diffuse reticular opacities and mediastinal lymphadenopathy. IR THROMB VEIN INFUSE INIT TX DAY   Final Result      Successful placement of 6 [place] EKOS catheters into bilateral pulmonary arteries for bilateral pulmonary arterial thrombolysis. CTA CHEST W CONTRAST   Final Result      1. Acute pulmonary emboli involving the right and left main pulmonary arteries and the lobar and segmental branches supplying the right upper lobe, right middle lobe, right lower lobe, lingula, left upper lobe and left lower lobe. . Right heart strain. Right lower lung mass consistent with neoplasm. Diffuse reticulation and reticular nodular opacities right lung likely relates to lymphangitic spread of tumor. Bronchial thickening likely relates to peribronchial spread of tumor. Groundglass opacities left upper lobe right upper lobe and left lower lobe indeterminate. Findings may relate to atelectasis pneumonitis or inflammatory /infectious process. Marked lymphadenopathy involving the right hilum ,mediastinum and right supraclavicular region. Large volume of ascites. Findings called as a critical result to the emergency room by Dr. Ivania Oakley at the time of dictation 9/28/2020 5:00 PM      XR CHEST PORTABLE   Final Result      Right lower lung intrapulmonary mass most consistent with neoplasm. Mediastinal and hilar lymphadenopathy likely relates to metastasis. Reticular nodular opacities noted diffusely in the right lung indeterminate. Lymphangitic spread of tumor is not excluded. EKG:   Echo:  Micro:     ASSESSMENT AND PLAN:   Sarah Jenkins is a 48 y.o. male, who was admitted for multivessel pulmonary embolisms.     Multi-vessel pulmonary embolism with cor pulmonale   - continue argatroban  - discontinued heparin gtt d/t thrombocytopenia (10/3)  - holding home eliquis    Acute on chronic hypoxic respiratory failure  - mechanical ventilation support  - PRVC 30  550  5  100%       EVER ground-glass opacities most likely PNA  - continue merrem (9/28 -)    Stage IV adenocarcinoma   - will continue novolumab/ipilimumab as outpatient care    BISMARK - stable  Cr increased from 0.9 on 9/30 to 1.3 on 10.2. During this interval he received trials of IV lasix and paracentesis that removed 1.7 L. BUN:Cr > 20. Some concern for hepato-renal etiology  - Nephrology consulted  - Additional diuresis per nephrology  - CR stable. UOP inadequate. - albumin added     Hypotonic Hyponatremia:  Appears hypervolemic from ascites, also possible contributing SIADH and poor oral intake. Concern for hepatorenal.  - Continue albumin and fluid restriction  - Spot-dosing lasix  - Nephrology consulted - reccs appreciated     Thrombocytopenia:  Likely 2/2 acute illness / severe liver disease. Possible DIC. HIT r/o.  - 10/1 HIT antibody negative  - 10/2 DIC labs - fibrinogen unchanged. - Peripheral smear collected     Code Status:Full Code  FEN:   PPX:  Argatroban  DISPO: ICU    This patient has been staffed and discussed with Eda Armando MD.  -----------------------------  Rj Lambert MD, PGY-1  10/4/2020  4:56 PM    Pulm/CC    Patient seen and examined. I agree with Dr. Sylwia Hastings history, physical, lab findings, assessment and plan. Since midnight pt has had progressive hypoxemic resp failure from 7 L to 15 L this morning and 100% NRB this afternoon. He was moved to ICU where he also had intermittent hypotension and bradycardia. He was lethargic but awaken and states he is very short of breath. No pain. He was breathing in 30's with significant WOB and appeared to be fatiguiung. He did mention he would want ventilator if needed and I told him that he did need. I discussed this with his wife and had her see him before I intubated him    CTPA 10/2  Impression         1.  Bilateral extensive pulmonary emboli. Pulmonary emboli appear slightly decreased in size/volume, compared to prior study. No new or enlarging pulmonary emboli. No central saddle embolus. 2. Right lower lobe and central pulmonary mass with right hilar and confluent mediastinal adenopathy. 3. Interstitial thickening throughout the right lung unchanged. 4. Groundglass airspace disease left lower lobe and anterior left upper lobe . Left upper lobe groundglass airspace disease is slightly increased. ECHO 10/2  Summary   Definity contrast administered. Normal left ventricle size, wall thickness, and systolic function with an   estimated ejection fraction of 55-60%. Mild-moderate tricuspid regurgitation. Right ventricular size is enlarged with hypokinetic walls. There is an echogenic structure located in the right ventricle likely a   clot/thrombus attached to the RV wall consider myxoma. Needs ENDY. I   IVC size is dilated (>2.1 cm) and collapses < 50% with respiration   consistent with elevated RA pressure (15 mmHg). Estimated pulmonary artery systolic pressure is at 55 mmHg assuming a right   atrial pressure of 15 mmHg. Cannot rule out pleural effusion versus artifact. After central line and intubation we placed an arterial line with his Wife's consent. In addition to his hypoxemic resp failure he now has shock requiring levophed at 30 and Zak at 300 (likely RV failure), a combined metabolic/resp acidemia, worsening leukocytosis of 36, thrombocytopenia of 67, renal failure with creatinine now 2 up from 1.3 this AM, hyperkalemia, and progressive liver failure. I have adjusted his vent to best help with acidemia and oxygenation. A call is being placed to Nephrology. Ultimately has has mult-system organ failure in setting of Stage IV lung cancer with extensive PE/PV thrombosis that has led to RV failure. His prognosis is very grim and I suspect death is imminent.  I have discussed how sick he is with the Wife but she wants to try and reverse what we can. His lung cancer is a new diagnosis and just started treatment so this is coming as quiet a shock. He is a Full Code    Pt has a high probability of imminent or life-threatening deterioration requiring close monitoring, and highly complex decision-making and/or interventions of high intensity to assess, manipulate, and support his critical organ systems to prevent a likely inevitable decline which could occur if left untreated. A total critical care time 44 minutes was used. This includes but not limited to examining patient, collaborating with other physicians, monitoring vital signs, telemetry, continuous pulse oximetry, and clinical response to IV medications, documentation time, review and interpretation of laboratory and radiological data, review of nursing notes and old record review. This time excludes any time that may have been spent performing procedures for life threatening organ failure.       Wilma Tenorio MD

## 2020-10-04 NOTE — PROGRESS NOTES
Pt transferred to ICU with documented belongings. Report called to SKYE Martin prior to transfer. Pt's wife Suad Poster notified of transfer via phone.  Electronically signed by Paola Whittaker RN on 10/4/2020 at 12:05 PM

## 2020-10-04 NOTE — PROGRESS NOTES
[facility] NEPHROLOGY    [facility]              [phone]                      Mr. Tana Flores is admitted with multivessel pulmonary embolisms. We are following for hyponatremia. Interval History and plan: Worsening sodium despite diuresis. BNP is 9921 and serum osmolality is 286 despite low sodium. This suggest pseudohyponatremia  Urine sodium is less than 20 with a creatinine of 248. Echocardiogram showed LVEF 55 to 60%. There may be thrombus versus myxoma. Uric acid is 9.4. I will continue to hold Lasix  Continue albumin  Lipid profile is normal.  Continue heparin drip  Continue antibiotics  Hyponatremia in the face of normal serum osmolality is probably pseudohyponatremia. It may be from obstructive jaundice/liver disease. Assessment :     Hyponatremia  Unclear, complex case. Likely due to decrease poor oral intake, liver disease or congestive heart failure. Less likely SIADH due to decreased urine sodium. Urine osmolality of 806 suggestive of decreased renal perfusion due to decreased intravascular volume from hepatorenal or cardiorenal source. Shortness of breath can be contributed 2/2 adenocarcinoma, PE or stress on heart. [facility] would like to thank Franca Gil MD   for opportunity to serve this patient      Please call with questions at-   24 Hrs Answering service [phone] or  7 am- 5 pm via Perfect serve or cell phone        CC/reason for consult :     hyponatremia     HPI :     Tana Flores is a 48 y.o. male presented to   the hospital on 9/28/2020 with dyspnea for five days. Patient states his dyspnea has been progressively getting worse. His symptoms are exacerbated on exertion. He states he has ran out of his home Eliquis one week ago and has not been taking it. Simultaneously, his use of his CPAP more frequently during the day, which he only has been using at night.  CT imaging revealed multivessel pulmonary embolisms, ascites and ground glass opacities in his EVER, RUL, LLL. He underwent EKOS which made him feel somewhat better. Patient's lab workup shown he was hyponatremia, for which nephrology was consulted. ROS:     Seen with-     positives in bold   Constitutional:  fever, chills, weakness, weight change, fatigue  Skin:  rash, pruritus, hair loss, bruising, dry skin, petechiae  Head, Face, Neck   headaches, swelling,  cervical adenopathy  Respiratory: shortness of breath, cough, or wheezing  Cardiovascular: chest pain, palpitations, dizzy, edema  Gastrointestinal: nausea, vomiting, diarrhea, constipation,belly pain    Yellow skin, blood in stool  Musculoskeletal:  back pain, muscle weakness, gait problems,       joint pain or swelling. Genitourinary:  dysuria, poor urine flow, flank pain, blood in urine  Neurologic:  vertigo, TIA'S, syncope, seizures, focal weakness  Psychosocial:  insomnia, anxiety, or depression. Additional positive findings:                        All other remaining systems are negative or unable to obtain        PMH/PSH/SH/Family History:     Past Medical History:   Diagnosis Date    Cancer [facility])     lung  cancer 2020    Chicken pox     DVT, bilateral lower limbs (HCC)     Lung cancer ([facility] [address]        Past Surgical History:   Procedure Laterality Date    IR PORT PLACEMENT EQUAL OR GREATER THAN 5 YEARS  8/25/2020    IR PORT PLACEMENT EQUAL OR GREATER THAN 5 YEARS 8/25/2020 [address] SPECIAL PROCEDURES        reports that he has never smoked. He has never used smokeless tobacco. He reports current alcohol use. He reports that he does not use drugs. family history includes Cancer in his father; High Blood Pressure in his father; High Cholesterol in his mother.          Medication:     Current Facility-Administered Medications: albumin human 25 % IV solution 25 g, 25 g, Intravenous, Q6H  argatroban 250 mg in dextrose 5 % 250 mL infusion, 0.5 mcg/kg/min, Intravenous, Continuous  melatonin 10/03/20  0656 10/03/20  0905 10/03/20  1828 10/04/20  0045 10/04/20  0229   * 122* 122* 122* 122*   K 5.0 5.2*  --   --  5.3*   CL 86* 88*  --   --  86*   CO2 19* 16*  --   --  18*   BUN 68* 64*  --   --  72*   CREATININE 1.3 1.1  --   --  1.3   GLUCOSE 117* 106*  --   --  93   PHOS  --  3.8  --   --   --             Thanks  Nephrology  [address] [facility], [address] [facility]  Office: [phone]  Cell: [phone]  Fax: [phone]

## 2020-10-04 NOTE — PLAN OF CARE
Problem: Respiratory:  Goal: Ability to maintain adequate ventilation will improve  Description: Ability to maintain adequate ventilation will improve  10/4/2020 0257 by Jane Ellis RN  Outcome: Ongoing   Pt's RR 24-26bpm. SPO2 is 90-92% on 10L O2 via high flow nasal cannula. Increased pt's O2 from 7L at beginning of shift to 8L. Pt currently on 10L. Problem: Bleeding:  Goal: Will show no signs and symptoms of excessive bleeding  Description: Will show no signs and symptoms of excessive bleeding  10/4/2020 0257 by Jane Ellis RN  Outcome: Ongoing   Pt's VSS. No s/s of bleeding. Pt is on Argatraban drip. Will continue to monitor labs.

## 2020-10-04 NOTE — PROCEDURES
Nik Schreiber is a 48 y.o. male patient. 1. Acute saddle pulmonary embolism with acute cor pulmonale (HCC)    2. Acute respiratory failure with hypoxia (HCC)    3. Other ascites      Past Medical History:   Diagnosis Date    Cancer [facility])     lung  cancer 2020    Chicken pox     DVT, bilateral lower limbs (HCC)     Lung cancer (HCC)      Blood pressure (!) 97/57, pulse 136, temperature 98.1 °F (36.7 °C), temperature source Axillary, resp. rate 27, height 5' 7\" (1.702 m), weight 255 lb 8.2 oz (115.9 kg), SpO2 98 %. Insert Arterial Line    Date/Time: 10/4/2020 6:06 PM  Performed by: Lizeth Noonan MD  Authorized by: Lizeth Noonan MD   Consent: The procedure was performed in an emergent situation. Verbal consent obtained. Written consent obtained. Risks and benefits: risks, benefits and alternatives were discussed  Consent given by: spouse  Required items: required blood products, implants, devices, and special equipment available  Patient identity confirmed: verbally with patient and arm band  Time out: Immediately prior to procedure a \"time out\" was called to verify the correct patient, procedure, equipment, support staff and site/side marked as required. Preparation: Patient was prepped and draped in the usual sterile fashion. Indications: multiple ABGs, respiratory failure and hemodynamic monitoring  Location: left femoral    Sedation:  Patient sedated: yes  Sedatives: propofol    Needle gauge: 18  Seldinger technique: Seldinger technique used  Cutdown: cutdown required  Number of attempts: 2  Post-procedure: line sutured and dressing applied  Comments: Left femoral site chosen due to failed attempts x2 at left radial arterial line site. Oozing at left femoral site persistent. Will place pressure dressing with D-stat and silver nitrate.            EBL 10 ml    Lizeth Noonan MD  51/6/5649      Pulm/CC    I was present and supervised procedure    Israel Hensley MD

## 2020-10-04 NOTE — PROGRESS NOTES
[address] [place] [address]  GI Progress Note        Diann Lucas is a 48 y.o. male patient. 1. Acute saddle pulmonary embolism with acute cor pulmonale (HCC)    2. Acute respiratory failure with hypoxia (HCC)    3. Other ascites        Admit Date: 2020    Subjective:       Remains SOB, no abd pain N/V GI bleeding    Scheduled Meds:   albumin human  25 g Intravenous Q6H    albuterol sulfate HFA  2 puff Inhalation 4x daily    meropenem  1 g Intravenous Q8H    sodium chloride flush  10 mL Intravenous 2 times per day       Continuous Infusions:   argatroban infusion 0.5 mcg/kg/min (10/03/20 2152)       PRN Meds:  melatonin, guaiFENesin-dextromethorphan, polyethylene glycol, promethazine **OR** ondansetron, sodium chloride flush      Objective:       Patient Vitals for the past 24 hrs:   BP Temp Temp src Pulse Resp SpO2 Weight   10/04/20 0500 114/71 97.7 °F (36.5 °C) Axillary 111 26 91 % 252 lb 8 oz (114.5 kg)   10/04/20 0459 -- -- -- -- -- (!) 88 % --   10/04/20 0052 108/74 97.9 °F (36.6 °C) Axillary 110 24 91 % --   10/03/20 2246 -- -- -- -- -- (!) 89 % --   10/03/20 2051 119/78 97.5 °F (36.4 °C) Axillary 106 26 92 % --   10/03/20 1742 -- -- -- 107 -- -- --   10/03/20 1636 -- -- -- -- -- 95 % --   10/03/20 1502 122/77 96.9 °F (36.1 °C) Axillary 102 26 92 % --   10/03/20 1338 -- -- -- 104 -- -- --   10/03/20 1132 124/77 96.9 °F (36.1 °C) Axillary 104 24 91 % --   10/03/20 0908 -- -- -- -- -- 93 % --   10/03/20 0755 115/66 98 °F (36.7 °C) Axillary 102 22 92 % --       Exam:  VITALS:  /71   Pulse 111   Temp 97.7 °F (36.5 °C) (Axillary)   Resp 26   Ht 5' 7\" (1.702 m)   Wt 252 lb 8 oz (114.5 kg)   SpO2 91%   BMI 39.55 kg/m²   TEMPERATURE:  Current - Temp: 97.7 °F (36.5 °C);  Max - Temp  Av.5 °F (36.4 °C)  Min: 96.9 °F (36.1 °C)  Max: 98 °F (36.7 °C)    NAD  General appearance: alert, appears stated age, cooperative and no distress  Abdomen: obese soft mild to mod ascites NT  AAOx3, No asterixis or encephalopathy      Recent Labs     10/02/20  0520 10/03/20  0656 10/04/20  0229   WBC 27.2* 27.8* 28.6*   HGB 14.1 13.1* 12.6*   HCT 42.2 39.7* 38.4*   MCV 84.7 85.1 86.2   PLT 73* 47* 53*     Recent Labs     10/03/20  0656 10/03/20  0905 10/03/20  1828 10/04/20  0045 10/04/20  0229   * 122* 122* 122* 122*   K 5.0 5.2*  --   --  5.3*   CL 86* 88*  --   --  86*   CO2 19* 16*  --   --  18*   PHOS  --  3.8  --   --   --    BUN 68* 64*  --   --  72*   CREATININE 1.3 1.1  --   --  1.3     Recent Labs     10/02/20  0629 10/03/20  0656 10/04/20  0229   * 234* 213*   * 106* 85*   BILIDIR 6.2* 7.0* 7.7*   BILITOT 7.3* 8.4* 9.6*   ALKPHOS 747* 695* 582*     No results for input(s): LIPASE, AMYLASE in the last 72 hours. Recent Labs     10/02/20  0629 10/02/20  0939 10/03/20  0656 10/04/20  0229   PROT 6.0*  --  6.0* 5.9*   INR  --  1.44*  --   --        Assessment:       Active Problems:    Acute saddle pulmonary embolism with acute cor pulmonale (HCC)    Acute respiratory failure with hypoxia (HCC)    Primary adenocarcinoma of lung (HCC)    Mediastinal lymphadenopathy    Other ascites    History of DVT in adulthood    Right atrial mass  Resolved Problems:    * No resolved hospital problems.  *  Probable PVT  Passive congestion of liver  Abnormal liver chem    Given multiple severe medical issues treatment options somewhat limited    Recommendations:       Diuretics if able from renal standpoint  Already on albumin  May need FU para in future , not needed now    Micah Wick  10/4/2020  7:18 AM

## 2020-10-04 NOTE — PROCEDURES
Hever Hunter is a 48 y.o. male patient. 1. Acute saddle pulmonary embolism with acute cor pulmonale (HCC)    2. Acute respiratory failure with hypoxia (HCC)    3. Other ascites      Past Medical History:   Diagnosis Date    Cancer [facility])     lung  cancer 2020    Chicken pox     DVT, bilateral lower limbs (HCC)     Lung cancer (HCC)      Blood pressure (!) 97/57, pulse 136, temperature 98.1 °F (36.7 °C), temperature source Axillary, resp. rate 27, height 5' 7\" (1.702 m), weight 255 lb 8.2 oz (115.9 kg), SpO2 98 %. Central Line    Date/Time: 10/4/2020 5:54 PM  Performed by: Maricarmen Beverly MD  Authorized by: Maricarmen Beverly MD   Consent: Written consent obtained.   Consent given by: spouse and patient  Patient understanding: patient states understanding of the procedure being performed  Patient consent: the patient's understanding of the procedure matches consent given  Procedure consent: procedure consent matches procedure scheduled  Relevant documents: relevant documents present and verified  Test results: test results available and properly labeled  Site marked: the operative site was marked  Imaging studies: imaging studies available  Patient identity confirmed: verbally with patient and arm band  Indications: vascular access    Anesthesia:  Local Anesthetic: lidocaine 1% without epinephrine  Anesthetic total: 5 mL    Sedation:  Patient sedated: no    Preparation: skin prepped with 2% chlorhexidine and skin prepped with ChloraPrep  Skin prep agent dried: skin prep agent completely dried prior to procedure  Sterile barriers: all five maximum sterile barriers used - cap, mask, sterile gown, sterile gloves, and large sterile sheet  Hand hygiene: hand hygiene performed prior to central venous catheter insertion  Location details: right femoral  Site selection rationale: patient cannot llie flat   Catheter type: triple lumen  Catheter size: 7 Fr  Pre-procedure: landmarks identified  Ultrasound guidance: yes  Sterile ultrasound techniques: sterile gel and sterile probe covers were used  Number of attempts: 2  Successful placement: yes  Post-procedure: line sutured and dressing applied  Assessment: blood return through all ports and free fluid flow  Comments: Amount of blood loss less than 3 cc          Jf Torres MD  10/4/2020    Pulm/CC    I was present and supervised procedure    Debbie Smith MD

## 2020-10-04 NOTE — PROGRESS NOTES
Internal Medicine  Progress Note    Admission Date: 9/28/2020     Chief Complaint: Shortness of Breath    History of Present Illness:  Kandice Mendez is a 49 yo M w/ PMH of Stage ARNALDO adenocarcinoma of the R lung (cT2, cN2, CM1a) on carboplatin/pemetrexed c/b DVT and chronic PE on Eliquis, HTN, and HLD who presented with acute PE. On 9/28/20 he was found to have acute PE of the b/l main pulmonary arteries with right heart strain. Interval History:  Since last night patient has experienced increased O2 requirement from 7 to 15 lpm due to hypoxemia. He endorses feeling more SOB and is visibly working harder to breath on exam. Vapotherm ordered. Ordered transfer to ICU. Argatroban paused this morning due to aPTT > 248. Checking aPTT q4h. Na stable at 122 on albumin and fluid restriction. He states his abdominal distention is stable, however it may be slightly worsened on exam. He had a BM yesterday. Past Medical History:      Diagnosis Date    Cancer [facility])     lung  cancer 2020    Chicken pox     DVT, bilateral lower limbs (HCC)     Lung cancer (HCC)          Past Surgical History:      Procedure Laterality Date    IR PORT PLACEMENT EQUAL OR GREATER THAN 5 YEARS  8/25/2020    IR PORT PLACEMENT EQUAL OR GREATER THAN 5 YEARS 8/25/2020 [facility] PROCEDURES         Medications Prior to Admission:  Medications Prior to Admission: apixaban (ELIQUIS) 5 MG TABS tablet, Take 1 tablet by mouth 2 times daily  folic acid (FOLVITE) 1 MG tablet, Take 1 mg by mouth daily      Allergies:  Amoxicillin      Family History:      Problem Relation Age of Onset    High Cholesterol Mother     High Blood Pressure Father     Cancer Father         Prostate         Social History:  Tobacco:  reports that he has never smoked. He has never used smokeless tobacco.  Alcohol:  reports current alcohol use.     Review of Systems:  As above    Vitals:    10/04/20 0459 10/04/20 0500 10/04/20 0750 10/04/20 0902   BP:  114/71 108/66 Pulse:  111 110    Resp:  26 28    Temp:  97.7 °F (36.5 °C) 96.1 °F (35.6 °C)    TempSrc:  Axillary Axillary    SpO2: (!) 88% 91% 92% 92%   Weight:  252 lb 8 oz (114.5 kg)     Height:         Physical Exam:    Constitutional: Awake. Well-developed and well-nourished. Moderate respiratory distress. HEENT: Normocephalic and atraumatic. No scleral icterus. Cardiovascular: Regular tachycaria. Normal S1, S2. No m/r/g. Pulmonary: Moderately increased work of breathing. Dyspneic at rest. Faint crackles b/l.  SpO2 90 on 10 lpm.  Gastrointestinal: BS+. Protuberant. No tenderness. Distended. Musculoskeletal: Minimal or no peripheral edema. Skin: No cyanosis or pallor. Neurological: Alert and oriented to person, place, time, and situation. Labs:  CBC:   Recent Labs     10/02/20  0520 10/03/20  0656 10/04/20  0229   WBC 27.2* 27.8* 28.6*   HGB 14.1 13.1* 12.6*   HCT 42.2 39.7* 38.4*   PLT 73* 47* 53*       BMP:   Recent Labs     10/03/20  0656 10/03/20  0905 10/03/20  1828 10/04/20  0045 10/04/20  0229   * 122* 122* 122* 122*   K 5.0 5.2*  --   --  5.3*   CL 86* 88*  --   --  86*   CO2 19* 16*  --   --  18*   BUN 68* 64*  --   --  72*   CREATININE 1.3 1.1  --   --  1.3   GLUCOSE 117* 106*  --   --  93     LFT's:   Recent Labs     10/02/20  0629 10/03/20  0656 10/04/20  0229   * 234* 213*   * 106* 85*   BILITOT 7.3* 8.4* 9.6*   ALKPHOS 747* 695* 582*     Cardiac:   No results for input(s): TROPONINI, BNP in the last 72 hours. Coagulation:   Recent Labs     10/02/20  0939   PROTIME 16.8*   INR 1.44*     Lactate: No results for input(s): LACTATE, LACTSEPSIS in the last 72 hours. ABG: No results for input(s): PHART, TDY8BVR, PO2ART, ZHP1TOK in the last 72 hours. U/A:  Recent Labs     10/02/20  1100   NITRU Negative   LEUKOCYTESUR Negative   PROTEINU TRACE*   GLUCOSEU Negative   KETUA TRACE*         Microbiology Cultures:   No results for input(s): BC in the last 72 hours.   No results for catheters into bilateral pulmonary arteries for bilateral pulmonary arterial thrombolysis. CTA CHEST W CONTRAST   Final Result      1. Acute pulmonary emboli involving the right and left main pulmonary arteries and the lobar and segmental branches supplying the right upper lobe, right middle lobe, right lower lobe, lingula, left upper lobe and left lower lobe. . Right heart strain. Right lower lung mass consistent with neoplasm. Diffuse reticulation and reticular nodular opacities right lung likely relates to lymphangitic spread of tumor. Bronchial thickening likely relates to peribronchial spread of tumor. Groundglass opacities left upper lobe right upper lobe and left lower lobe indeterminate. Findings may relate to atelectasis pneumonitis or inflammatory /infectious process. Marked lymphadenopathy involving the right hilum ,mediastinum and right supraclavicular region. Large volume of ascites. Findings called as a critical result to the emergency room by Dr. Juanito Araujo at the time of dictation 9/28/2020 5:00 PM      XR CHEST PORTABLE   Final Result      Right lower lung intrapulmonary mass most consistent with neoplasm. Mediastinal and hilar lymphadenopathy likely relates to metastasis. Reticular nodular opacities noted diffusely in the right lung indeterminate. Lymphangitic spread of tumor is not excluded. Assessment and Plan:  Dae Light is a 48 y.o. male with a PMH of Stage ARNALDO adenocarcinoma of the R lung (cT2, cN2, CM1a) on carboplatin/pemetrexed c/b DVT and chronic PE on Eliquis, HTN, and HLD who presented with acute PE. Pulmonary embolus with core pulmonale, stable  S/p EKOS on 9/28. Estimated pulmonary artery systolic 55 mmHg per 0/23 echo. Slightly decreased in size/voume on 10/2 CT.  - Continue argatroban, paused now. APTT q4h. Will need to restart when appropriate.   - Discontinued hep gtt 10/3 d/t thrombocytopenia  - Holding home eliquis  - Increasing O2 requirement  - Repeat thrombectomy is being considered    Likely RV thrombus, possible myxoma  Seen in echo on 9/28  - ENDY is being considered    Acute on chronic hypoxic resp failure - Worsening  multifactorial- PE, lung cancer, possible pneumonia. Ascites likely contributing.  - Transferring to ICU  - Starting prednisone, duonebs  - O2 requirement increasing - vapotherm ordered  - Ascites management as below    Possible pneumonia  - Continue meropenem  - Stopped Vanc 9/30  - 9/28 Blood cx NGTF  - MRSA negative , COVID negative     Lung adenocarcinoma stage IV  S/p one cycle of carbo/alimta in Aug. Now on novolumab/ipillimumab  CT is concerning for lymphangitic spread of cancer    New onset ascites   Likely 2/2 liver pathology. Markedly elevated LFTs (AST, ALT, Alk Phos, Bilirubin)  10/1 Paracentesis SAAG 1.7 (portal HTN is likely cause with 97% accuracy), fluid protein 1.3 consistent with hepatic origin of transudate not cardiac. Concern for Budd-Chiari. 10/2 U/S could not exclude partially occluding thrombus. - Paracentesis 9/30, 1.7 L removed. - Holding off on repeat para for now  - Spot-dosing lasix    BISMARK - stable  Cr increased from 0.9 on 9/30 to 1.3 on 10.2. During this interval he received trials of IV lasix and paracentesis that removed 1.7 L. BUN:Cr > 20. Some concern for hepato-renal etiology  - Nephrology consulted  - Additional diuresis per nephrology  - CR stable. UOP inadequate. - albumin added    Hypotonic Hyponatremia:  Appears hypervolemic from ascites, also possible contributing SIADH and poor oral intake. Concern for hepatorenal.  - Continue albumin and fluid restriction  - Spot-dosing lasix  - Nephrology consulted - reccs appreciated    Thrombocytopenia:  Likely 2/2 acute illness / severe liver disease. Possible DIC. HIT r/o.  - 10/1 HIT antibody negative  - 10/2 DIC labs - fibrinogen unchanged.    - Peripheral smear collected      Recent pseudomonas infection of the port:  - Continue meropenem  - ID Consulted. Plan to continue abx through Monday. Lactic Acidosis  - possibly related to liver failure and tumor burden  - worsened after diuresis  - continue to monitor   - 10/4 morning lactic acid not yet drawn - lab and nurse contacted. Will need to follow-up      This plan will be discussed with the attending physician, Dr. Chelita Cash. Code Status: Full Code  FEN: DIET GENERAL; Daily Fluid Restriction: 1200 ml  PPX: argatroban gtt  DISPO: [facility]    Enio Tamayo MD PGY-1  10/4/2020, 9:14 AM     Patient seen and examined, plan of care discussed with residents. Agree with their assessment and plan with following addendum:  Respiratory status worsened overnight. Being transferred to ICU for close monitoring. Continues to have issues with anticoagulation- PLT stabilized after stopping heparin drip, but dosing of argatroban remains at issue. Monitor aPTT and restart once below 100.        Rebecca Newman

## 2020-10-04 NOTE — PROGRESS NOTES
(GLYCOLAX) packet 17 g  17 g Oral Daily PRN Matt Hernandez MD        promethazine (PHENERGAN) tablet 12.5 mg  12.5 mg Oral Q6H PRN Matt Hernandez MD        Or    ondansetron (ZOFRAN) injection 4 mg  4 mg Intravenous Q6H PRN Matt Hernandez MD        meropenem (MERREM) 1 g in sodium chloride 0.9 % 100 mL IVPB (mini-bag)  1 g Intravenous Q8H Matt Hernandez MD   Stopped at 10/04/20 0832    sodium chloride flush 0.9 % injection 10 mL  10 mL Intravenous 2 times per day Matt Hernandez MD   10 mL at 10/04/20 0801    sodium chloride flush 0.9 % injection 10 mL  10 mL Intravenous PRN Matt Hernandez MD           LABS:     CBC:   Lab Results   Component Value Date    WBC 28.6 (H) 10/04/2020    HGB 12.6 (L) 10/04/2020    HCT 38.4 (L) 10/04/2020    MCV 86.2 10/04/2020    PLT 53 (L) 10/04/2020    LYMPHOPCT 4.0 10/04/2020    RBC 4.46 10/04/2020    MCH 28.2 10/04/2020    MCHC 32.8 10/04/2020    RDW 18.7 (H) 10/04/2020    NEUTOPHILPCT 82.0 10/04/2020    MONOPCT 8.0 10/04/2020    BASOPCT 0.0 10/04/2020    NEUTROABS 24.9 (H) 10/04/2020    LYMPHSABS 1.1 10/04/2020    MONOSABS 2.3 (H) 10/04/2020    EOSABS 0.3 10/04/2020    BASOSABS 0.0 10/04/2020       CMP:   Lab Results   Component Value Date     10/04/2020    K 5.3 10/04/2020    CL 86 10/04/2020    CO2 18 10/04/2020    BUN 72 10/04/2020    CREATININE 1.3 10/04/2020    GLUCOSE 93 10/04/2020    CALCIUM 8.3 10/04/2020    PROT 5.9 10/04/2020    LABALBU 3.0 10/04/2020    BILITOT 9.6 10/04/2020    ALKPHOS 582 10/04/2020     10/04/2020    ALT 85 10/04/2020        Antimitochondrial M2 negative  Blood cultures from 9/28 so far negative. Covid testing negative  Await HIT testing.     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 Respiratory failure. He already had thrombolytic therapy. PLAN:       Continue Argatroban. Dr. Issac Nicholson managing pulmonary situation currently. Called wife to update her about Mr. Gabby Villa precarious situation, cautioned her that he may end up on mechanical ventilation.     Robin Kovacs

## 2020-10-05 NOTE — SIGNIFICANT EVENT
Called to room as patient's blood pressure dropping to SBP 50s despite being maxed out on Levophed, Epinephrine, Vasopressin and Neosynephrine. Patient noted to have hyperkalemia despite being on CRRT on most recent labs. Calcium gluconate was already infusing and insulin and D50 ordered. While in the room, patient lost pulse and CPR initiated at [facility] [address] [place] [address]: Patient given 1 mg Epinephrine. 1 amp D50  0323: 10 units insulin were given. Remained in PEA  0324: 1 amp bicarb given, calcium chloride given  0325: 1 mg epi given. Rhythm check: Patient in pulseless Vtach, Defibrillated at 200 J  0327: Back in PEA, 1 mg epi given  0329: Patient in V.Fib. Defibrillated at 300 J  0330: 1 amp bicarb given  0331: Patient in PEA, 1 mg Epi given  0332: Labs notable for pH 7.098, lactate 13.18, K 6.2  0333: Remained in PEA  0334: 1 mg Epi given  0335: PEA, calcium chloride given  0337: Asystole. 1 amp bicarb given, 1 mg Epi given  0339: Asystole  0340: 1 mg epi given  0341: Wife was present during the code. At this time she asked that CPR be stopped. Chest compressions were stopped. Patient had no pulse.      Time of Death: 10/5/2020 at 901 [place] MD Darian  Internal Medicine Resident, PGY-3

## 2020-10-05 NOTE — CONSULTS
General Surgery   Resident Consult Note    Reason for Consult: temporary HD line placement    History of Present Illness:   Robinsno Merchant is a 48 y.o. male with Hx of stage IV lung cancer adenocarcinoma complicated by DVT and chronic PE presented with 5 days of dyspnea and was found to have acute submassive PE status post EKOS. His symptoms continue to worsen and had respiratory failures requiring intubation. He was also found to have severe BISMARK with lactic acidosis, hyperkalemia, requiring emergent CRRT. Of note, there is a concern for HIT and patient was placed on argatroban, which has been held since 6 hours ago. Patient had femoral CVC and arterial line placed recently with continuous oozing from the sites. Past Medical History:        Diagnosis Date    Cancer [facility])     lung  cancer 2020    Chicken pox     DVT, bilateral lower limbs (HCC)     Lung cancer (HCC)        Past Surgical History:           Procedure Laterality Date    IR PORT PLACEMENT EQUAL OR GREATER THAN 5 YEARS  8/25/2020    IR PORT PLACEMENT EQUAL OR GREATER THAN 5 YEARS 8/25/2020 [facility] SPECIAL PROCEDURES       Allergies:  Amoxicillin    Medications:   Home Meds  No current facility-administered medications on file prior to encounter.       Current Outpatient Medications on File Prior to Encounter   Medication Sig Dispense Refill    apixaban (ELIQUIS) 5 MG TABS tablet Take 1 tablet by mouth 2 times daily      folic acid (FOLVITE) 1 MG tablet Take 1 mg by mouth daily         Current Meds  ipratropium-albuterol (DUONEB) nebulizer solution 1 ampule, Q4H WA  furosemide (LASIX) 100 mg in dextrose 5 % 100 mL infusion, Continuous  morphine (PF) injection 1 mg, Q4H PRN  phenylephrine (OLIVIA-SYNEPHRINE) 10 MG/ML injection,   phenylephrine (OLIVIA-SYNEPHRINE) 50 mg in dextrose 5 % 250 mL infusion, Continuous  glucose (GLUTOSE) 40 % oral gel 15 g, PRN  dextrose 50 % IV solution, PRN  glucagon (rDNA) injection 1 mg, PRN  dextrose 5 % solution, PRN  calcium gluconate 10 % injection,   calcium gluconate 10 % injection 1 g, Once  silver nitrate applicators applicator 1 each, Once  norepinephrine (LEVOPHED) 16 mg in dextrose 5 % 250 mL infusion, Continuous  propofol injection, Titrated  prismaSATE BGK 2/0 5,000 mL with calcium chloride 0.92 g solution, Continuous  prismaSATE BGK 2/0 5,000 mL with calcium chloride 0.92 g solution, Continuous  prismaSATE BGK 2/0 5,000 mL with calcium chloride 0.92 g solution, Continuous  potassium chloride 20 mEq/50 mL IVPB (Central Line), PRN  magnesium sulfate 1 g in dextrose 5% 100 mL IVPB, PRN  calcium gluconate 1 g in dextrose 5% 100 mL IVPB, PRN    Or  calcium gluconate 2 g in dextrose 5 % 100 mL IVPB, PRN    Or  calcium gluconate 3 g in dextrose 5% 100 mL IVPB, PRN    Or  calcium gluconate 4 g in dextrose 5% 100 mL IVPB, PRN  sodium phosphate 6 mmol in dextrose 5 % 100 mL IVPB, PRN    Or  sodium phosphate 12 mmol in dextrose 5 % 250 mL IVPB, PRN    Or  sodium phosphate 18 mmol in dextrose 5 % 500 mL IVPB, PRN    Or  sodium phosphate 24 mmol in dextrose 5 % 500 mL IVPB, PRN  sodium bicarbonate 1mEq / ml infusion 250 ml, Continuous  vasopressin 20 Units in dextrose 5 % 100 mL infusion, Continuous  insulin regular (HUMULIN R;NOVOLIN R) injection 10 Units, Once    And  dextrose 50 % IV solution, Once  calcium gluconate 10 % injection 1 g, Once  sodium citrate 4 % injection 5 mL, Once  albumin human 25 % IV solution 25 g, Q6H  argatroban 250 mg in dextrose 5 % 250 mL infusion, Continuous  melatonin tablet 6 mg, Nightly PRN  guaiFENesin-dextromethorphan (ROBITUSSIN DM) 100-10 MG/5ML syrup 5 mL, Q4H PRN  polyethylene glycol (GLYCOLAX) packet 17 g, Daily PRN  promethazine (PHENERGAN) tablet 12.5 mg, Q6H PRN    Or  ondansetron (ZOFRAN) injection 4 mg, Q6H PRN  meropenem (MERREM) 1 g in sodium chloride 0.9 % 100 mL IVPB (mini-bag), Q8H  sodium chloride flush 0.9 % injection 10 mL, 2 times per day  sodium chloride flush 0.9 % 10/04/20  1118   APTT 148.2* >248.0* 125.6*     Liver Profile:  Lab Results   Component Value Date     10/04/2020    ALT 85 10/04/2020    BILIDIR 7.7 10/04/2020    BILITOT 9.6 10/04/2020    ALKPHOS 582 10/04/2020     Lab Results   Component Value Date    CHOL 199 10/03/2020    HDL 17 10/03/2020    TRIG 117 10/03/2020     UA:   Lab Results   Component Value Date    COLORU Yellow 10/02/2020    PHUR 5.5 10/02/2020    WBCUA 0-2 10/02/2020    RBCUA 0-2 10/02/2020    BACTERIA 1+ 10/02/2020    CLARITYU Clear 10/02/2020    SPECGRAV >=1.030 10/02/2020    LEUKOCYTESUR Negative 10/02/2020    UROBILINOGEN 1.0 10/02/2020    BILIRUBINUR LARGE 10/02/2020    BLOODU Negative 10/02/2020    GLUCOSEU Negative 10/02/2020    AMORPHOUS 2+ 10/02/2020       Imaging:   XR CHEST PORTABLE   Final Result   1. Left IJ catheter as described. No change otherwise. XR CHEST PORTABLE   Final Result   1. Multifocal bilateral pneumonia with slight worsening compared to previous. XR CHEST PORTABLE   Final Result   Impression:   1. Bilateral airspace disease stable. 2. Right lung mass with margins obscured by surrounding consolidation. CT ABDOMEN PELVIS W IV CONTRAST Additional Contrast? None   Final Result      1. Moderate ascites, new. 2. Diffuse inhomogeneity of the liver without discrete mass. This could be flow related or related to hepatic congestion. Follow-up recommended. CTA PULMONARY W CONTRAST   Final Result      1. Bilateral extensive pulmonary emboli. Pulmonary emboli appear slightly decreased in size/volume, compared to prior study. No new or enlarging pulmonary emboli. No central saddle embolus. 2. Right lower lobe and central pulmonary mass with right hilar and confluent mediastinal adenopathy. 3. Interstitial thickening throughout the right lung unchanged. 4. Groundglass airspace disease left lower lobe and anterior left upper lobe .  Left upper lobe groundglass airspace disease is slightly increased. US ABDOMEN LIMITED   Final Result   1. Poor spectral waveform and color flow the main portal vein, partially occluding thrombus is not excluded. 2. Small amount of abdominal ascites. US DUP ABD PEL RETRO SCROT COMPLETE   Final Result   1. Poor spectral waveform and color flow the main portal vein, partially occluding thrombus is not excluded. 2. Small amount of abdominal ascites. XR CHEST PORTABLE   Final Result   Impression: Findings similar to the prior study with right lower lobe pulmonary mass lesion, diffuse reticular opacities and mediastinal lymphadenopathy. IR THROMB VEIN INFUSE INIT TX DAY   Final Result      Successful placement of 6 [place] EKOS catheters into bilateral pulmonary arteries for bilateral pulmonary arterial thrombolysis. CTA CHEST W CONTRAST   Final Result      1. Acute pulmonary emboli involving the right and left main pulmonary arteries and the lobar and segmental branches supplying the right upper lobe, right middle lobe, right lower lobe, lingula, left upper lobe and left lower lobe. . Right heart strain. Right lower lung mass consistent with neoplasm. Diffuse reticulation and reticular nodular opacities right lung likely relates to lymphangitic spread of tumor. Bronchial thickening likely relates to peribronchial spread of tumor. Groundglass opacities left upper lobe right upper lobe and left lower lobe indeterminate. Findings may relate to atelectasis pneumonitis or inflammatory /infectious process. Marked lymphadenopathy involving the right hilum ,mediastinum and right supraclavicular region. Large volume of ascites. Findings called as a critical result to the emergency room by Dr. Angeles Ruby at the time of dictation 9/28/2020 5:00 PM      XR CHEST PORTABLE   Final Result      Right lower lung intrapulmonary mass most consistent with neoplasm.       Mediastinal and hilar lymphadenopathy likely relates to metastasis. Reticular nodular opacities noted diffusely in the right lung indeterminate. Lymphangitic spread of tumor is not excluded. Assessment/Plan: This is a 48 y.o. male with Hx of stage IV lung cancer with acute submassive PE status post EKOS with multiorgan failure. Patient requiring CRRT. Line placement risk and benefit was discussed with wife and wife would like to continue to proceed. Patient was at high risk of bleeding due to his coagulopathy most likely due to liver failure. Emergent temporary HD catheter was placed without any complication. There was no bleeding or hematoma. Chest x-ray was completed to confirm placement. Okay to proceed with HD per surgery. Discussed with staff         Major Serrano MD  General Surgery Resident  10/04/20  9:21 PM  [phone]    I saw and independently examined the patient today. I agree with the history of present illness, past medical/surgical histories, family history, social history, medication list and allergies as listed. The review of systems is as noted above. My physical exam confirms the findings listed above. Review of labs, pathology reports, radiology reports and medical records confirm the findings noted above. I edited the note where appropriate. Discussed with teams involved and wife extensively.     Rahul Perez MD  Surgery Attending

## 2020-10-05 NOTE — PROCEDURES
Hever Hunter is a 48 y.o. male patient. 1. Acute saddle pulmonary embolism with acute cor pulmonale (HCC)    2. Acute respiratory failure with hypoxia (HCC)    3. Other ascites      Past Medical History:   Diagnosis Date    Cancer [facility])     lung  cancer 2020    Chicken pox     DVT, bilateral lower limbs (HCC)     Lung cancer (HCC)      Blood pressure (!) 91/30, pulse 131, temperature 99.3 °F (37.4 °C), temperature source Bladder, resp. rate (!) 31, height 5' 7\" (1.702 m), weight 255 lb 8.2 oz (115.9 kg), SpO2 96 %. Central Line    Date/Time: 10/4/2020 9:19 PM  Performed by: Ricardo Vila MD  Authorized by: Ricardo Vila MD   Consent: Verbal consent obtained. Written consent obtained. Risks and benefits: risks, benefits and alternatives were discussed  Consent given by: spouse  Site marked: the operative site was marked  Imaging studies: imaging studies available  Required items: required blood products, implants, devices, and special equipment available  Patient identity confirmed: arm band and provided demographic data  Indications: vascular access  Anesthesia: see MAR for details    Sedation:  Patient sedated: yes  Sedatives: see MAR for details  Analgesia: see MAR for details    Preparation: skin prepped with ChloraPrep  Skin prep agent dried: skin prep agent completely dried prior to procedure  Sterile barriers: all five maximum sterile barriers used - cap, mask, sterile gown, sterile gloves, and large sterile sheet  Hand hygiene: hand hygiene performed prior to central venous catheter insertion  Location details: left internal jugular  Patient position: Trendelenburg  Catheter type: triple lumen  Catheter size: 13 Fr.   Pre-procedure: landmarks identified  Ultrasound guidance: yes  Sterile ultrasound techniques: sterile gel and sterile probe covers were used  Number of attempts: 1  Successful placement: yes  Post-procedure: line sutured and dressing applied  Assessment: blood return through all ports, free fluid flow and placement verified by x-ray  Comments: EBL 7 cc              Duane Motley MD  10/4/2020    I was present for the critical portions of procedure and supervised. Agree with the above note and changes made accordingly.      Ella Schreiber MD  Surgery Attending

## 2020-10-05 NOTE — PROGRESS NOTES
Nephrology note  Received a message from the patient nurse apparently patient condition has deteriorated. The patient most recent lab work showed a sodium 126, potassium went up to 7.1, bicarb 14, BUN 86, creatinine was 2.4  So we will start patient on CRRT.   Order placed

## 2020-10-05 NOTE — PROGRESS NOTES
Patient intubated, central line placement and arterial line placement completed at bedside per ICU residents. Pt having a large amount blood  oozing from insertion sites. MDs aware. Pt has developed jaundice. Extremities cool and edematous. Pt started on Levophed and Phenylephrine for hypotension, now on maximum dosage. SBP low 90's, ST in 130's per monitor. Lasix gtt started at 10 mg/hr. Cervantes catheter inserted, 12 cc of dark vicki urine returned, pt has not had any UOP since insertion, Dr. Chantale Gonzalez aware, planning for vas cath placement with subsequent CRRT to be started. Pt's wife at bedside, updated and questions/concerns addressed, spoke with Dr. Cheyenne Rosales re: pt condition and plan of care. All labwork, ABG's drawn and results reported to MD. Will continue to monitor closely.

## 2020-10-05 NOTE — SIGNIFICANT EVENT
Notice of Death    Time of Death: 7493 10/5/2020  Time Pronounced: 0343 10/5/2020    I personally evaluated the pt at bedside. Pt was found to be without cardiac activity on monitor. Brain stem reflexes were absent. There was no pupillary response. Pupils were fixed and dilated with no corneal or oculocephalic reflex. No gag reflex. Pt was areflexic and without any spontaneous respirations or respiratory movements. No palpable pulses throughout. No response to painful stimuli. GCS of 3.     Physical Exam:  VS:        No palpable pulse, no blood pressure   GEN:     Pallor with no spontaneous movement   HEENT: Pupils fixed and dilated   CVS:      No heart sounds audible   Chest:    No audible lung sounds  Neuro:   Absent reflexes    Sherri Needs DO  10/5/2020 3:50 AM

## 2020-10-05 NOTE — PROGRESS NOTES
0315: Residents called to patient's room, BP dropping, maxed on all pressors  0321: Code blue called  See code flowsheet

## 2020-10-06 LAB
KAPPA, FREE LIGHT CHAINS, SERUM: 135.58 MG/L (ref 3.3–19.4)
KAPPA/LAMBDA RATIO: 1.17 (ref 0.26–1.65)
KAPPA/LAMBDA TEST COMMENT: ABNORMAL
LAMBDA, FREE LIGHT CHAINS, SERUM: 115.88 MG/L (ref 5.71–26.3)

## 2020-10-07 LAB
FACTOR VII ACTIVITY: 7 % (ref 80–181)
MITOCHONDRIAL M2 AB, IGG: 3.9 UNITS (ref 0–24.9)

## 2020-10-11 NOTE — PROGRESS NOTES
Physician Progress Note      Contreras Trotter  [facility] #:                  [id number]  :                       1970  ADMIT DATE:       2020 3:10 PM  [address] [place] DATE:        10/5/2020 8:56 AM  RESPONDING  PROVIDER #:        Sung Alonso MD          QUERY TEXT:    Dear Attending Provider,    Pt with multifocal pneumonia. Documentation of recent infection of port. On   admission noted to have WBC 24.1,, RR 22-34, lactic acid 3.0-3.8. If   possible, please document in the progress notes and discharge summary if you   are evaluating and /or treating any of the following: The medical record reflects the following:  Risk Factors: Multifocal pneumonia,  Recent pseudomonas infection of the port  Clinical Indicators: On admission noted to have WBC 24.1,, RR 22-34,   lactic acid 3.0-3.8. CTA chest on admission shown groundglass opacities left upper lobe right upper   lobe and left lower lobe indeterminate. Findings may relate to atelectasis   pneumonitis or inflammatory /infectious process. Documentation of multifocal pneumonia  Documentation of Port infection. Culture grew Pseudomonas.  He continues on   meropenem   Catheter tip culture shown pseudomonas aeruginosa   Blood cultures negative  Treatment: IVF, vancomycin, merrem  Options provided:  -- Sepsis due to pneumonia present on admission  -- No Sepsis, multifocal pneumonia and localized infection of port only  -- Other - I will add my own diagnosis  -- Disagree - Not applicable / Not valid  -- Disagree - Clinically unable to determine / Unknown  -- Refer to Clinical Documentation Reviewer    PROVIDER RESPONSE TEXT:    This patient has sepsis due to pneumonia which was present on admission    Query created by: Voncille Cogan on 10/8/2020 7:09 AM      Electronically signed by:  Sung Alonso MD 10/11/2020 8:26 AM

## 2020-10-12 NOTE — DISCHARGE SUMMARY
[address] SUMMARY - DEATH NOTE   TIME OF DEATH 9326 10/5/2020    Patient ID: Sergey Fuentes                                             Patient's PCP: Lakisha Milan MD                                          Admitting Physician: Pito Ricks MD  Admit Date: 9/28/2020   Discharge Physician: Tri Teran DO  Discharge Date: 10/5/2020    PROBLEMS DURING HOSPITALIZATION:  Patient Active Problem List   Diagnosis    Pharyngitis, acute    Conjunctivitis    Pain in soft tissues of limb    Skin disorder    Hyperlipemia    Acute saddle pulmonary embolism with acute cor pulmonale ([address]    Acute respiratory failure with hypoxia ([address]    Primary adenocarcinoma of lung ([address]    Mediastinal lymphadenopathy    Other ascites    History of DVT in adulthood    Right atrial mass       DISCHARGE DIAGNOSES:  1- Multi-vessel pulmonary embolism with cor pulmonale  2- acute on chronic hypoxic respiratory failure  3- stage IV adenocarcinoma  4- BISMARK  5- hypotonic hyponatremia   6- thrombocytopenia    Hospital Course:  Mr. Sergey Fuentes is a 19-year-old male with a history of stage IV adenocarcinoma of right lung, DVT, chronic PE (on Eliquis), HTN, and HLD who presented with new onset of dyspnea. He stated had a cough as well as unintentional weight loss for the past several months. His dyspnea had been worsening for the past five days prior to presenting to the ED. He claimed he has ran out of Eliquis for about a week. Denied any non-productive cough, fever, chills, nausea, vomiting, abdominal pain. In the ED, patient presented with dyspnea with increased lower extremity swelling and increased abdominal girth. CXR consistent with known metastatic lung cancer. CBC with a white count of 24.1. CTA shown multiple bilateral PE with evidence of right heart strain with increased RV diameter compared to prior studies. Patient was also found to have ascites on CT.   IR was consulted and